# Patient Record
Sex: FEMALE | Race: BLACK OR AFRICAN AMERICAN | NOT HISPANIC OR LATINO | URBAN - METROPOLITAN AREA
[De-identification: names, ages, dates, MRNs, and addresses within clinical notes are randomized per-mention and may not be internally consistent; named-entity substitution may affect disease eponyms.]

---

## 2016-12-30 NOTE — PROGRESS NOTE ADULT - SUBJECTIVE AND OBJECTIVE BOX
seen and examined by me.  See H+P earlier today signed by Dr. Murry.  monitor on tele  check tte  continue ivf  hopeful transfer to inpatient psych soon, when stable  discussed with Dr. Cantrell (psychiatry)

## 2016-12-30 NOTE — CONSULT NOTE ADULT - SUBJECTIVE AND OBJECTIVE BOX
Chief Complaint:  Patient is a 51y old  Female who presents with a chief complaint of     HPI:  HPI:      Review of Systems:    General:  No wt loss, fevers, chills, night sweats  Eyes:  Good vision, no reported pain  ENT:  No sore throat, pain, runny nose, dysphagia  CV:  No pain, palpitatioins, hypo/hypertension  Resp:  No dyspnea, cough, tachypnea, wheezing  GI:  No pain, nausea, vomiting, diarrhea, constipatiion  :  No pain, bleeding, incontinence, nocturia  Muscle:  No pain, weakness  Breast:  No pain, abscess, mass, discharge  Neuro:  No weakness, tingling, memory problems  Psych:  No fatigue, insomnia, mood problems, depression  Endocrine:  No polyuria, polydypsia, cold/heat intolerance  Heme:  No petechiae, ecchymosis, easy bruisability  Skin:  No rash, tattoos, scars, edema    Relevant Family History:       Relevant Social History:      Discussed with:  PMD, Family    Physical Exam:      Vital Signs:  Vital Signs Last 24 Hrs  T(C): 36, Max: 36 (12-30 @ 03:34)  T(F): 96.8, Max: 96.8 (12-30 @ 03:34)  HR: 76 (76 - 76)  BP: 130/69 (130/69 - 130/69)  BP(mean): --  RR: 16 (16 - 16)  SpO2: 100% (100% - 100%)  Daily     Daily   I&O's Summary      General:  Appears stated age, well-groomed, well-nourished, no distress  HEENT:  NC/AT, patent nares w/ pink mucosa, OP clear w/o lesions, PERRL, EOMI, conjunctivae clear, no thyromegaly, nodules, adenopathy, no JVD  Chest:  Full & symmetric excursion, no increased effort, breath sounds clear  Cardiovascular:  Regular rhythm, S1, S2, no murmur/rub/S3/S4, no carotid/femoral/abdominal bruit, radial/pedal pulses 2+, no edema  Abdomen:  Soft, non-tender, non-distended, normoactive bowel sounds, no HSM  Extremities:  Gait & station:   Digits:   Nails:   Joints, Bones, Muscles:   ROM:   Stability:  Skin:  No rash/erythema/ecchymoses/petechiae/wounds/abscess/warm/dry  Musculoskeletal:  Full ROM in all joints w/o swelling/tenderness/effusion  Neuro/Psych:  Alert, oriented, normal and symmetric strength in UEs, LEs, normal gait, sensation intact, affect:   mood:   appearance:       Laboratory:                            13.8   7.2   )-----------( 241      ( 30 Dec 2016 03:26 )             39.9     30 Dec 2016 03:26    143    |  104    |  11     ----------------------------<  140    3.4     |  28     |  0.84     Ca    9.1        30 Dec 2016 03:26  Phos  4.9       30 Dec 2016 04:51  Mg     2.1       30 Dec 2016 03:26    TPro  7.8    /  Alb  3.7    /  TBili  0.3    /  DBili  x      /  AST  60     /  ALT  55     /  AlkPhos  60     30 Dec 2016 03:26    ABG - ( 30 Dec 2016 03:26 )  pH: x     /  pCO2: 40    /  pO2: 85    / HCO3: 27    / Base Excess: 3.1   /  SaO2: 96                CARDIAC MARKERS ( 30 Dec 2016 03:26 )  <.015 ng/mL / x     / 2095 U/L / x     / x          CAPILLARY BLOOD GLUCOSE    LIVER FUNCTIONS - ( 30 Dec 2016 03:26 )  Alb: 3.7 g/dL / Pro: 7.8 gm/dL / ALK PHOS: 60 U/L / ALT: 55 U/L / AST: 60 U/L / GGT: x           PT/INR - ( 30 Dec 2016 03:26 )   PT: 13.7 sec;   INR: 1.22 ratio         PTT - ( 30 Dec 2016 03:26 )  PTT:24.9 sec      Imaging:      Assessment:      Plan:         Sergio Yang MD, FACC, FASE, FASNC, FACP  Director, Heart Failure Services  [facility]  , Department of Cardiology  [employer] [facility] [employer] Chief Complaint:  Patient is a 51y old female who presents with a chief complaint of syncope    HPI:     All: Ampicillin    Past Medical history: Mood constipation    Medications:   Colace  Remeron    Review of Systems:    General:  No wt loss, fevers, chills, night sweats  Eyes:  Good vision, no reported pain  ENT:  No sore throat, pain, runny nose, dysphagia  CV:  No pain, palpitations, hypo/hypertension  Resp:  No dyspnea, cough, tachypnea, wheezing  GI:  No pain, nausea, vomiting, diarrhea, constipation  :  No pain, bleeding, incontinence, nocturia  Muscle:  No pain, weakness  Breast:  No pain, abscess, mass, discharge  Neuro:  No weakness, tingling, memory problems  Psych:  No fatigue, insomnia, mood problems, depression  Endocrine:  No polyuria, polydipsia cold/heat intolerance  Heme:  No petechiae, ecchymosis, easy bruisability  Skin:  No rash, tattoos, scars, edema    Relevant Family History:       Relevant Social History:      Discussed with:  PMD, Family    Physical Exam:    Tele: SR.  Vital Signs:  Vital Signs Last 24 Hrs  T(C): 36, Max: 36 (12-30 @ 03:34)  T(F): 96.8, Max: 96.8 (12-30 @ 03:34)  HR: 76 (76 - 76)  BP: 130/69 (130/69 - 130/69)  RR: 16 (16 - 16)  SpO2: 100% (100% - 100%)      General:  Appears stated age, well-groomed, well-nourished, no distress  HEENT:  NC/AT, patent nares w/ pink mucosa, OP clear w/o lesions, PERRL, EOMI, conjunctivae clear, no thyromegaly, nodules, adenopathy, no JVD  Chest:  Full & symmetric excursion, no increased effort, breath sounds clear  Cardiovascular:  Regular rhythm, S1, S2, no murmur/rub/S3/S4, no carotid/femoral/abdominal bruit, radial/pedal pulses 2+, no edema  Abdomen:  Soft, non-tender, non-distended, normoactive bowel sounds, no HSM  Extremities:  Gait & station:   Digits:   Nails:   Joints, Bones, Muscles:   ROM:   Stability:  Skin:  No rash/erythema/ecchymoses/petechiae/wounds/abscess/warm/dry  Musculoskeletal:  Full ROM in all joints w/o swelling/tenderness/effusion  Neuro/Psych:  Alert, oriented, normal and symmetric strength in UEs, LEs, normal gait, sensation intact, affect:   mood:   appearance:       Laboratory:                            13.8   7.2   )-----------( 241      ( 30 Dec 2016 03:26 )             39.9     30 Dec 2016 03:26    143    |  104    |  11     ----------------------------<  140    3.4     |  28     |  0.84     Ca    9.1        30 Dec 2016 03:26  Phos  4.9       30 Dec 2016 04:51  Mg     2.1       30 Dec 2016 03:26    TPro  7.8    /  Alb  3.7    /  TBili  0.3    /  DBili  x      /  AST  60     /  ALT  55     /  AlkPhos  60     30 Dec 2016 03:26    ABG - ( 30 Dec 2016 03:26 )  pH: x     /  pCO2: 40    /  pO2: 85    / HCO3: 27    / Base Excess: 3.1   /  SaO2: 96          CARDIAC MARKERS ( 30 Dec 2016 03:26 )  <.015 ng/mL / x     / 2095 U/L / x     / x          CAPILLARY BLOOD GLUCOSE    LIVER FUNCTIONS - ( 30 Dec 2016 03:26 )  Alb: 3.7 g/dL / Pro: 7.8 gm/dL / ALK PHOS: 60 U/L / ALT: 55 U/L / AST: 60 U/L / GGT: x           PT/INR - ( 30 Dec 2016 03:26 )   PT: 13.7 sec;   INR: 1.22 ratio         PTT - ( 30 Dec 2016 03:26 )  PTT:24.9 sec      Imaging:      Assessment: 50 yo F with mood disorder, hx of constipation pw syncope.      Plan:   Tele observation, check vitals, check echo.  Con't:  docusate sodium 100milliGRAM(s) Oral two times a day  sodium chloride 0.9%. 1000milliLiter(s) IV Continuous <Continuous>        Sergio Yang MD, FACC, FASE, FASNC, FACP  Director, Heart Failure Services  [facility]  , Department of Cardiology  [facility] Chief Complaint:  Patient is a 51y old female who presents with a chief complaint of syncope    HPI: 50 yo F with mood disorder, constipation, obese by BMI, admitted to psych [facility] with confusion/delirium/mood disturbance. Lost consciencness in sousa. Sent over to tele for futher work up. Prediabetes reported. Mild chest pain, No sob, pnd, palps, edema.    All: Ampicillin    Past Medical history: Mood disorder, constipation, predm, obese.    Medications:   Colace prn  Remeron 1 mg BID    Relevant Family History:   CAD in aunt.    Relevant Social History:  Non-smoker    Review of Systems:    General:  No wt loss, fevers, chills, night sweats  Eyes:  Good vision, no reported pain  ENT:  No sore throat, pain, runny nose, dysphagia  CV:  No pain, palpitations, hypo/hypertension  Resp:  No dyspnea, cough, tachypnea, wheezing  GI:  No pain, nausea, vomiting, diarrhea. Hx of constipation.  :  No pain, bleeding, incontinence, nocturia  Muscle:  No pain, weakness  Breast:  No pain, abscess, mass, discharge  Neuro:  No weakness, tingling, memory problems  Psych:  Mood disturbance noted.  Endocrine:  No polyuria, polydipsia cold/heat intolerance  Heme:  No petechiae, ecchymosis, easy bruisability  Skin:  No rash, tattoos, scars, edema      Physical Exam:    Tele: SR.  Vital Signs:  Vital Signs Last 24 Hrs  T(C): 36, Max: 36 (12-30 @ 03:34)  T(F): 96.8, Max: 96.8 (12-30 @ 03:34)  HR: 76 (76 - 76)  BP: 130/69 (130/69 - 130/69)  RR: 16 (16 - 16)  SpO2: 100% (100% - 100%)      General:  Appears stated age, well-groomed, well-nourished, no distress  HEENT:  NC/AT, patent nares w/ pink mucosa, OP clear w/o lesions, EOMI, conjunctivae clear, no thyromegaly, nodules, adenopathy, no JVD  Chest:  Full & symmetric excursion, no increased effort, breath sounds clear  Cardiovascular:  Regular rhythm, S1, S2, no murmur/rub/S3/S4, radial pulses 1+, no edema  Abdomen:  Soft, non-tender, non-distended, normoactive bowel sounds  Extremities:  No c/c/edema.  Skin:  No rash/erythema. Skin is warm/dry  Musculoskeletal:  Full ROM in all joints w/o swelling/tenderness/effusion  Neuro/Psych:  Alert.    Laboratory:                            13.8   7.2   )-----------( 241      ( 30 Dec 2016 03:26 )             39.9     30 Dec 2016 03:26    143    |  104    |  11     ----------------------------<  140    3.4     |  28     |  0.84     Ca    9.1        30 Dec 2016 03:26  Phos  4.9       30 Dec 2016 04:51  Mg     2.1       30 Dec 2016 03:26    TPro  7.8    /  Alb  3.7    /  TBili  0.3    /  DBili  x      /  AST  60     /  ALT  55     /  AlkPhos  60     30 Dec 2016 03:26    ABG - ( 30 Dec 2016 03:26 )  pH: x     /  pCO2: 40    /  pO2: 85    / HCO3: 27    / Base Excess: 3.1   /  SaO2: 96          CARDIAC MARKERS ( 30 Dec 2016 03:26 )  <.015 ng/mL / x     / 2095 U/L / x     / x          CAPILLARY BLOOD GLUCOSE    LIVER FUNCTIONS - ( 30 Dec 2016 03:26 )  Alb: 3.7 g/dL / Pro: 7.8 gm/dL / ALK PHOS: 60 U/L / ALT: 55 U/L / AST: 60 U/L / GGT: x           PT/INR - ( 30 Dec 2016 03:26 )   PT: 13.7 sec;   INR: 1.22 ratio         PTT - ( 30 Dec 2016 03:26 )  PTT:24.9 sec      Imaging:   cxr:No focal air space opacities.  head ct:   No acute intracranial hemorrhage, mass effect, or acute osseous fracture.  ECG: SR, nonspecific ST T abnls.    Assessment: 50 yo F with mood disorder, hx of constipation pw syncope event in psych sousa. Normal SR on tele. Possible vagal episode.    Plan:   Tele observation, check vitals, check echo.  Con't:  docusate sodium 100milliGRAM(s) Oral two times a day  sodium chloride 0.9%. 1000milliLiter(s) IV Continuous <Continuous>  Supportive care.      Sergio Yang MD, FACC, FASE, FASNC, FACP  Director, Heart Failure Services  [facility]  , Department of Cardiology  [facility]

## 2016-12-31 NOTE — PROGRESS NOTE ADULT - SUBJECTIVE AND OBJECTIVE BOX
Patient is a 51y old  Female who presents with a chief complaint of     INTERVAL HPI/OVERNIGHT EVENTS:  patient notes mild discomfort at iv site.  Also difficulty sleeping.  No chest pain palpitations, nausea or vomiting.    MEDICATIONS  (STANDING):  docusate sodium 100milliGRAM(s) Oral two times a day  sodium chloride 0.9%. 1000milliLiter(s) IV Continuous <Continuous>  atorvastatin 20milliGRAM(s) Oral at bedtime    MEDICATIONS  (PRN):  LORazepam     Tablet 1milliGRAM(s) Oral every 6 hours PRN Agitation  LORazepam   Injectable 2milliGRAM(s) IntraMuscular once PRN Agitation  haloperidol     Tablet 5milliGRAM(s) Oral every 6 hours PRN agitation/psychosis  haloperidol    Injectable 5milliGRAM(s) IntraMuscular once PRN agitation  diphenhydrAMINE   Capsule 50milliGRAM(s) Oral every 6 hours PRN Extrapyramidal prophylaxis  diphenhydrAMINE   Injectable 50milliGRAM(s) IntraMuscular once PRN agitation  acetaminophen   Tablet. 650milliGRAM(s) Oral every 6 hours PRN Moderate Pain (4 - 6)  aluminum hydroxide/magnesium hydroxide/simethicone Suspension 30milliLiter(s) Oral every 6 hours PRN Dyspepsia      Allergies    ampicillin (Unknown)    Intolerances        REVIEW OF SYSTEMS:  CONSTITUTIONAL: + fatigue No fever or weight loss  EYES: No eye pain, visual disturbances, or discharge  ENMT:  No difficulty hearing, tinnitus, vertigo; No sinus or throat pain  NECK: No pain or stiffness  BREASTS: No pain, masses, or nipple discharge  RESPIRATORY: No cough, wheezing, chills or hemoptysis; No shortness of breath  CARDIOVASCULAR: intermittent chest pain, no palpitations, dizziness, or leg swelling  GASTROINTESTINAL: No abdominal or epigastric pain. No nausea, vomiting, or hematemesis; No diarrhea or constipation. No melena or hematochezia.  GENITOURINARY: No dysuria, frequency, hematuria, or incontinence  NEUROLOGICAL: No headaches, memory loss, loss of strength, numbness, or tremors  SKIN: No itching, burning, rashes, or lesions   LYMPH NODES: No enlarged glands  ENDOCRINE: No heat or cold intolerance; No hair loss  MUSCULOSKELETAL: No joint pain or swelling; No muscle, back, or extremity pain  PSYCHIATRIC: depression, anxiety, "feels sad" difficulty sleeping no mood swings   HEME/LYMPH: No easy bruising, or bleeding gums  ALLERGY AND IMMUNOLOGIC: No hives or eczema    Vital Signs Last 24 Hrs  T(C): 36.2, Max: 37 (12-31 @ 05:05)  T(F): 97.2, Max: 98.6 (12-31 @ 05:05)  HR: 82 (69 - 89)  BP: 134/81 (123/69 - 140/70)  BP(mean): --  RR: 18 (16 - 18)  SpO2: 99% (98% - 100%)    PHYSICAL EXAM:  GENERAL: NAD, well-groomed, well-developed  HEAD:  Atraumatic, Normocephalic  EYES: EOMI, PERRLA, conjunctiva and sclera clear  ENMT: No tonsillar erythema, exudates, or enlargement; Moist mucous membranes, Good dentition, No lesions  NECK: Supple, No JVD  NERVOUS SYSTEM:  Alert & Oriented X3, Good concentration; Motor Strength 5/5 B/L upper and lower extremities; DTRs 2+ intact and symmetric  CHEST/LUNG: Clear to auscultation bilaterally; No rales, rhonchi, wheezing, or rubs  HEART: Regular rate and rhythm; No murmurs, rubs, or gallops  ABDOMEN: Soft, Nontender, Nondistended; Bowel sounds present  EXTREMITIES:  2+ Peripheral Pulses, No clubbing, cyanosis, or edema  LYMPH: No lymphadenopathy noted  SKIN: No rashes or lesions    LABS:                        13.8   7.2   )-----------( 241      ( 30 Dec 2016 03:26 )             39.9     31 Dec 2016 06:41    146    |  106    |  7      ----------------------------<  119    3.3     |  30     |  0.73     Ca    9.0        31 Dec 2016 06:41  Phos  4.9       30 Dec 2016 04:51  Mg     2.1       30 Dec 2016 03:26    TPro  7.8    /  Alb  3.7    /  TBili  0.3    /  DBili  x      /  AST  60     /  ALT  55     /  AlkPhos  60     30 Dec 2016 03:26    PT/INR - ( 30 Dec 2016 03:26 )   PT: 13.7 sec;   INR: 1.22 ratio         PTT - ( 30 Dec 2016 03:26 )  PTT:24.9 sec    CAPILLARY BLOOD GLUCOSE      RADIOLOGY & ADDITIONAL TESTS:    Imaging Personally Reviewed:  [x ] YES  [ ] NO    Consultant(s) Notes Reviewed:  [x ] YES  [ ] NO    Care Discussed with Consultants/Other Providers [x] YES  [ ] NO

## 2016-12-31 NOTE — PROGRESS NOTE ADULT - SUBJECTIVE AND OBJECTIVE BOX
Chief Complaint:  Patient is a 51y old female who presents with a chief complaint of syncope    HPI: 52 yo F with mood disorder, constipation, obese by BMI, admitted to psych [facility] with confusion/delirium/mood disturbance. Lost consciencness in sousa. Sent over to tele for futher work up. Prediabetes reported. Mild chest pain, No sob, pnd, palps, edema.    All: Ampicillin    Past Medical history: Mood disorder, constipation, predm, obese.    Medications:   Colace prn  Remeron 1 mg BID    Relevant Family History:   CAD in aunt.    Relevant Social History:  Non-smoker    Review of Systems:    General:  No wt loss, fevers, chills, night sweats  Eyes:  Good vision, no reported pain  ENT:  No sore throat, pain, runny nose, dysphagia  CV:  No pain, palpitations, hypo/hypertension  Resp:  No dyspnea, cough, tachypnea, wheezing  GI:  No pain, nausea, vomiting, diarrhea. Hx of constipation.  :  No pain, bleeding, incontinence, nocturia  Muscle:  No pain, weakness  Breast:  No pain, abscess, mass, discharge  Neuro:  No weakness, tingling, memory problems  Psych:  Mood disturbance noted.  Endocrine:  No polyuria, polydipsia cold/heat intolerance  Heme:  No petechiae, ecchymosis, easy bruisability  Skin:  No rash, tattoos, scars, edema      Physical Exam:      Vital Signs Last 24 Hrs  T(C): 36.2, Max: 37 (12-31 @ 05:05)  T(F): 97.2, Max: 98.6 (12-31 @ 05:05)  HR: 82 (69 - 89)  BP: 134/81 (123/69 - 140/70)  RR: 18 (16 - 18)  SpO2: 99% (98% - 100%)    Tele: SR/ST.  General:  Appears stated age, well-groomed, well-nourished, no distress  HEENT:  NC/AT, patent nares w/ pink mucosa, OP clear w/o lesions, EOMI, conjunctivae clear, no thyromegaly, nodules, adenopathy, no JVD  Chest:  Full & symmetric excursion, no increased effort, breath sounds clear  Cardiovascular:  Regular rhythm, S1, S2, no murmur/rub/S3/S4, radial pulses 1+, no edema  Abdomen:  Soft, non-tender, non-distended, normoactive bowel sounds  Extremities:  No c/c/edema.  Skin:  No rash/erythema. Skin is warm/dry  Musculoskeletal:  Full ROM in all joints w/o swelling/tenderness/effusion  Neuro/Psych:  Alert.    Laboratory:                          13.8   7.2   )-----------( 241      ( 30 Dec 2016 03:26 )             39.9     Basic Metabolic Panel in AM (12.31.16 @ 06:41)    Sodium, Serum: 146 mmol/L    Potassium, Serum: 3.3 mmol/L    Chloride, Serum: 106 mmol/L    Carbon Dioxide, Serum: 30 mmol/L    Anion Gap, Serum: 10 mmol/L    Blood Urea Nitrogen, Serum: 7 mg/dL    Creatinine, Serum: 0.73 mg/dL    Glucose, Serum: 119 mg/dL    Calcium, Total Serum: 9.0 mg/dL      TPro  7.8    /  Alb  3.7    /  TBili  0.3    /  DBili  x      /  AST  60     /  ALT  55     /  AlkPhos  60     30 Dec 2016 03:26    ABG - ( 30 Dec 2016 03:26 )  pH: x     /  pCO2: 40    /  pO2: 85    / HCO3: 27    / Base Excess: 3.1   /  SaO2: 96          CARDIAC MARKERS ( 30 Dec 2016 03:26 )  <.015 ng/mL / x     / 2095 U/L / x     / x          CAPILLARY BLOOD GLUCOSE    LIVER FUNCTIONS - ( 30 Dec 2016 03:26 )  Alb: 3.7 g/dL / Pro: 7.8 gm/dL / ALK PHOS: 60 U/L / ALT: 55 U/L / AST: 60 U/L / GGT: x           PT/INR - ( 30 Dec 2016 03:26 )   PT: 13.7 sec;   INR: 1.22 ratio         PTT - ( 30 Dec 2016 03:26 )  PTT:24.9 sec      Imaging:   cxr:No focal air space opacities.  head ct:   No acute intracranial hemorrhage, mass effect, or acute osseous fracture.  ECG: SR, nonspecific ST T abnls.    Assessment: 52 yo F with mood disorder, hx of constipation pw syncope event in psych [facility]. Normal SR on tele. Possible vagal episode.    Plan:   Telemetry monitoring, echo pending, f/u labs.  Con't treatment with:  docusate sodium 100milliGRAM(s) Oral two times a day  sodium chloride 0.9%. 1000milliLiter(s) IV Continuous <Continuous>  atorvastatin 20milliGRAM(s) Oral at bedtime    Sergio Yang MD, FACC, FASE, FASNC, FACP  Director, Heart Failure Services  [facility]  , Department of Cardiology  [facility] Chief Complaint:  Patient is a 51y old female who presents with a chief complaint of syncope    HPI: 50 yo F with mood disorder, constipation, obese by BMI, admitted to psych [facility] with confusion/delirium/mood disturbance. Lost consciencness in sousa. Sent over to tele for futher work up. Prediabetes reported. Mild chest pain, No sob, pnd, palps, edema.    All: Ampicillin    Past Medical history: Mood disorder, constipation, predm, obese.    Medications:   Colace prn  Remeron 1 mg BID    Relevant Family History:   CAD in aunt.    Relevant Social History:  Non-smoker    Review of Systems:    General:  No wt loss, fevers, chills, night sweats  Eyes:  Good vision, no reported pain  ENT:  No sore throat, pain, runny nose, dysphagia  CV:  No pain, palpitations, hypo/hypertension  Resp:  No dyspnea, cough, tachypnea, wheezing  GI:  No pain, nausea, vomiting, diarrhea. Hx of constipation.  :  No pain, bleeding, incontinence, nocturia  Muscle:  No pain, weakness  Breast:  No pain, abscess, mass, discharge  Neuro:  No weakness, tingling, memory problems  Psych:  Mood disturbance noted.  Endocrine:  No polyuria, polydipsia cold/heat intolerance  Heme:  No petechiae, ecchymosis, easy bruisability  Skin:  No rash, tattoos, scars, edema      Physical Exam:      Vital Signs Last 24 Hrs  T(C): 36.2, Max: 37 (12-31 @ 05:05)  T(F): 97.2, Max: 98.6 (12-31 @ 05:05)  HR: 82 (69 - 89)  BP: 134/81 (123/69 - 140/70)  RR: 18 (16 - 18)  SpO2: 99% (98% - 100%)    Tele: SR/ST.  General:  Appears stated age, well-groomed, well-nourished, no distress  HEENT:  NC/AT, patent nares w/ pink mucosa, OP clear w/o lesions, EOMI, conjunctivae clear, no thyromegaly, nodules, adenopathy, no JVD  Chest:  Full & symmetric excursion, no increased effort, breath sounds clear  Cardiovascular:  Regular rhythm, S1, S2, no murmur/rub/S3/S4, radial pulses 1+, no edema  Abdomen:  Soft, non-tender, non-distended, normoactive bowel sounds  Extremities:  No c/c/edema.  Skin:  No rash/erythema. Skin is warm/dry  Musculoskeletal:  Full ROM in all joints w/o swelling/tenderness/effusion  Neuro/Psych:  Alert.    Laboratory:                          13.8   7.2   )-----------( 241      ( 30 Dec 2016 03:26 )             39.9     Basic Metabolic Panel in AM (12.31.16 @ 06:41)    Sodium, Serum: 146 mmol/L    Potassium, Serum: 3.3 mmol/L    Chloride, Serum: 106 mmol/L    Carbon Dioxide, Serum: 30 mmol/L    Anion Gap, Serum: 10 mmol/L    Blood Urea Nitrogen, Serum: 7 mg/dL    Creatinine, Serum: 0.73 mg/dL    Glucose, Serum: 119 mg/dL    Calcium, Total Serum: 9.0 mg/dL      TPro  7.8    /  Alb  3.7    /  TBili  0.3    /  DBili  x      /  AST  60     /  ALT  55     /  AlkPhos  60     30 Dec 2016 03:26    ABG - ( 30 Dec 2016 03:26 )  pH: x     /  pCO2: 40    /  pO2: 85    / HCO3: 27    / Base Excess: 3.1   /  SaO2: 96          CARDIAC MARKERS ( 30 Dec 2016 03:26 )  <.015 ng/mL / x     / 2095 U/L / x     / x          CAPILLARY BLOOD GLUCOSE    LIVER FUNCTIONS - ( 30 Dec 2016 03:26 )  Alb: 3.7 g/dL / Pro: 7.8 gm/dL / ALK PHOS: 60 U/L / ALT: 55 U/L / AST: 60 U/L / GGT: x           PT/INR - ( 30 Dec 2016 03:26 )   PT: 13.7 sec;   INR: 1.22 ratio         PTT - ( 30 Dec 2016 03:26 )  PTT:24.9 sec      Imaging:   cxr:No focal air space opacities.  head ct:   No acute intracranial hemorrhage, mass effect, or acute osseous fracture.  ECG: SR, nonspecific ST T abnls.    Assessment: 50 yo F with mood disorder, hx of constipation pw syncope event in psych [facility]a. Normal SR on tele. Possible vagal episode.    Plan:   Telemetry monitoring, postural vitals, echo pending, f/u labs.  Con't treatment with:  docusate sodium 100milliGRAM(s) Oral two times a day  sodium chloride 0.9%. 1000milliLiter(s) IV Continuous <Continuous>  atorvastatin 20milliGRAM(s) Oral at bedtime    Sergio Yang MD, FACC, FASMARIANO, KURT, FACP  Director, Heart Failure Services  [facility]  , Department of Cardiology  [facility]

## 2016-12-31 NOTE — PROGRESS NOTE ADULT - ASSESSMENT
50 y/o female who's initially here for Decompensation 2dy to Non-compliance to meds for Bipolar Disorder, is being admitted from Psych Unit to Telemetry s/p Fall/Syncope.

## 2016-12-31 NOTE — PROGRESS NOTE ADULT - ATTENDING COMMENTS
possible transfer to psych tomorrow if stable  2 pc done, will require approval of attending covering psychiatrist tomorrow

## 2016-12-31 NOTE — PROGRESS NOTE ADULT - PROBLEM SELECTOR PLAN 1
tachy on tele to 150s corresponds to period of agitation as per rn  remote telemetry  fall precautions  tte ef 60-65% essentially normal  IVF  f/u head and cervical spine imaging results

## 2017-01-01 NOTE — PROGRESS NOTE ADULT - PROBLEM SELECTOR PLAN 1
syncope likely vasovagal  tachycardia resolve yesterday, has been nsr for 24 hrsd, d/c tele  tte ef 60-65% essentially normal  check orthostatics  cleared by cards for discharge

## 2017-01-01 NOTE — PROGRESS NOTE ADULT - SUBJECTIVE AND OBJECTIVE BOX
Patient is a 51y old  Female who presents with a chief complaint of     INTERVAL HPI/OVERNIGHT EVENTS:  c/o mild headache in occipital area, no lightheadedness    MEDICATIONS  (STANDING):  docusate sodium 100milliGRAM(s) Oral two times a day  sodium chloride 0.9%. 1000milliLiter(s) IV Continuous <Continuous>  atorvastatin 20milliGRAM(s) Oral at bedtime    MEDICATIONS  (PRN):  LORazepam     Tablet 1milliGRAM(s) Oral every 6 hours PRN Agitation  LORazepam   Injectable 2milliGRAM(s) IntraMuscular once PRN Agitation  haloperidol     Tablet 5milliGRAM(s) Oral every 6 hours PRN agitation/psychosis  haloperidol    Injectable 5milliGRAM(s) IntraMuscular once PRN agitation  diphenhydrAMINE   Capsule 50milliGRAM(s) Oral every 6 hours PRN Extrapyramidal prophylaxis  diphenhydrAMINE   Injectable 50milliGRAM(s) IntraMuscular once PRN agitation  acetaminophen   Tablet. 650milliGRAM(s) Oral every 6 hours PRN Moderate Pain (4 - 6)  aluminum hydroxide/magnesium hydroxide/simethicone Suspension 30milliLiter(s) Oral every 6 hours PRN Dyspepsia      Allergies    ampicillin (Unknown)    Intolerances        REVIEW OF SYSTEMS:  CONSTITUTIONAL: No fever, weight loss, or fatigue  EYES: No eye pain, visual disturbances, or discharge  ENMT:  No difficulty hearing, tinnitus, vertigo; No sinus or throat pain  NECK: No pain or stiffness  BREASTS: No pain, masses, or nipple discharge  RESPIRATORY: No cough, wheezing, chills or hemoptysis; No shortness of breath  CARDIOVASCULAR: No chest pain, palpitations, dizziness, or leg swelling  GASTROINTESTINAL: No abdominal or epigastric pain. No nausea, vomiting, or hematemesis; No diarrhea or constipation. No melena or hematochezia.  GENITOURINARY: No dysuria, frequency, hematuria, or incontinence  NEUROLOGICAL: No headaches, memory loss, loss of strength, numbness, or tremors  SKIN: No itching, burning, rashes, or lesions   LYMPH NODES: No enlarged glands  ENDOCRINE: No heat or cold intolerance; No hair loss  MUSCULOSKELETAL: No joint pain or swelling; No muscle, back, or extremity pain  PSYCHIATRIC: No depression, anxiety, mood swings, or difficulty sleeping  HEME/LYMPH: No easy bruising, or bleeding gums  ALLERGY AND IMMUNOLOGIC: No hives or eczema    Vital Signs Last 24 Hrs  T(C): 37, Max: 37.2 (12-31 @ 17:26)  T(F): 98.6, Max: 99 (12-31 @ 17:26)  HR: 92 (77 - 92)  BP: 120/64 (101/51 - 120/64)  BP(mean): --  RR: 18 (16 - 18)  SpO2: 97% (97% - 99%)    PHYSICAL EXAM:  GENERAL: NAD, well-groomed, well-developed  HEAD:  Atraumatic, Normocephalic  EYES: EOMI, PERRLA, conjunctiva and sclera clear  ENMT: No tonsillar erythema, exudates, or enlargement; Moist mucous membranes, Good dentition, No lesions  NECK: Supple, No JVD, Normal thyroid  NERVOUS SYSTEM:  Alert & Oriented X3, Good concentration; Motor Strength 5/5 B/L upper and lower extremities; DTRs 2+ intact and symmetric  CHEST/LUNG: Clear to percussion bilaterally; No rales, rhonchi, wheezing, or rubs  HEART: Regular rate and rhythm; No murmurs, rubs, or gallops  ABDOMEN: Soft, Nontender, Nondistended; Bowel sounds present  EXTREMITIES:  2+ Peripheral Pulses, No clubbing, cyanosis, or edema  LYMPH: No lymphadenopathy noted  SKIN: No rashes or lesions  PSYCH;  flat affect    LABS:    31 Dec 2016 06:41    146    |  106    |  7      ----------------------------<  119    3.3     |  30     |  0.73     Ca    9.0        31 Dec 2016 06:41          CAPILLARY BLOOD GLUCOSE      RADIOLOGY & ADDITIONAL TESTS:    Imaging Personally Reviewed:  [ x] YES  [ ] NO    Consultant(s) Notes Reviewed:  [ x] YES  [ ] NO    Care Discussed with Consultants/Other Providers [ ] YES  [ ] NO

## 2017-01-01 NOTE — PROGRESS NOTE ADULT - SUBJECTIVE AND OBJECTIVE BOX
Chief Complaint:  Patient is a 51y old female who presents with a chief complaint of syncope    HPI: 52 yo F with mood disorder, constipation, obese by BMI, admitted to psych sousa with confusion/delirium/mood disturbance. Lost consciencness in sousa. Sent over to tele for futher work up. Prediabetes reported. Mild chest pain, No sob, pnd, palps, edema. Feels some mild dizziness at times.      Review of Systems:    General:  No wt loss, fevers, chills, night sweats  Eyes:  Good vision, no reported pain  ENT:  No sore throat, pain, runny nose, dysphagia  CV:  No pain, palpitations, hypo/hypertension  Resp:  No dyspnea, cough, tachypnea, wheezing  GI:  No pain, nausea, vomiting, diarrhea. Hx of constipation.  :  No pain, bleeding, incontinence, nocturia  Muscle:  No pain, weakness  Breast:  No pain, abscess, mass, discharge  Neuro:  No weakness, tingling, memory problems, positive dizziness.  Psych:  Mood disturbance noted.  Endocrine:  No polyuria, polydipsia cold/heat intolerance  Heme:  No petechiae, ecchymosis, easy bruisability  Skin:  No rash, tattoos, scars, edema      Physical Exam:    Vital Signs Last 24 Hrs  T(C): 37, Max: 37.2 (12-31 @ 17:26)  T(F): 98.6, Max: 99 (12-31 @ 17:26)  HR: 92 (77 - 92)  BP: 120/64 (101/51 - 120/64)  RR: 18 (16 - 18)  SpO2: 97% (97% - 99%)    Tele: SR/ST.  General:  Appears stated age, well-groomed, well-nourished, no distress  HEENT:  NC/AT, patent nares w/ pink mucosa, OP clear w/o lesions, EOMI, conjunctivae clear, no thyromegaly, nodules, adenopathy, no JVD  Chest:  Full & symmetric excursion, no increased effort, breath sounds clear  Cardiovascular:  Regular rhythm, S1, S2, no murmur/rub/S3/S4, radial pulses 1+, no edema  Abdomen:  Soft, non-tender, non-distended, normoactive bowel sounds  Extremities:  No c/c/edema.  Skin:  No rash/erythema. Skin is warm/dry  Musculoskeletal:  Full ROM in all joints w/o swelling/tenderness/effusion  Neuro/Psych:  Alert.    Laboratory:        CPK downtrending to 537          PTT - ( 30 Dec 2016 03:26 )  PTT:24.9 sec      Imaging:   cxr:No focal air space opacities.    head ct: No acute intracranial hemorrhage, mass effect, or acute osseous fracture.    ECG: SR, nonspecific ST T abnls.    echo:   1. Left ventricular ejection fraction, by visual estimation, is 60 to 65%.   2. Technically limited study.   3. Mild pulmonic valve regurgitation.   4. Trace mitral and tricuspid valve regurgitation.    Assessment: 52 yo F with mood disorder, hx of constipation pw syncope event in psych sousa. Normal SR on tele. Possible vagal episode. Normal EF.    Plan:   d/c tele. c/w supportive care. transfer to psych vs outpatient care as per team. Perhaps consider Antivert for dizziness if needed.  Con't treatment with:  docusate sodium 100milliGRAM(s) Oral two times a day  sodium chloride 0.9%. 1000milliLiter(s) IV Continuous <Continuous>  atorvastatin 20milliGRAM(s) Oral at bedtime    Sergio Yang MD, FACC, DULCE MARIA, KURT, FACP  Director, Heart Failure Services  [facility]  , Department of Cardiology  [employer] [facility] [employer] [employer]

## 2017-01-01 NOTE — PROGRESS NOTE ADULT - ASSESSMENT
52 y/o female who's initially here for Decompensation 2dy to Non-compliance to meds for Bipolar Disorder, is being admitted from Psych Unit to Telemetry s/p Fall/Syncope.

## 2017-01-01 NOTE — PROGRESS NOTE ADULT - PROBLEM SELECTOR PLAN 2
continue with psych recommendations  Findings and Case d/w Dr Cantrell  psych fu requested as pt. medically cleared to return back to psych

## 2017-01-02 NOTE — PROGRESS NOTE ADULT - ASSESSMENT
52 y/o female who's initially here for Decompensation 2/2 to Non-compliance to meds for Bipolar Disorder, is being admitted from Psych Unit to Telemetry s/p Fall/Syncope. TTE- EF 60-65% essentially normal

## 2017-01-02 NOTE — PHYSICAL THERAPY INITIAL EVALUATION ADULT - PERTINENT HX OF CURRENT PROBLEM, REHAB EVAL
Patient admitted to 2B with mood disorder, constipation and obesity, admitted to psych with confusion/delirium and mood disturbance. During 2B visit, lost consciousness in sousa and was sent to 2D for further workup. x-ray negative for acute fx or traumatic malalignment in C/S, no hemorrhage, fracture or mass effect. Echocardiogram shows ejection fraction of 60-65%, mild pulmonic valve regurgitation and trace mitral/tricuspid valve regurgitation

## 2017-01-02 NOTE — PHYSICAL THERAPY INITIAL EVALUATION ADULT - ACTIVE RANGE OF MOTION EXAMINATION, REHAB EVAL
bilateral upper extremity Active ROM was WFL (within functional limits)/bilateral  lower extremity Active ROM was WFL (within functional limits)

## 2017-01-02 NOTE — PROGRESS NOTE ADULT - PROBLEM SELECTOR PLAN 2
- continue with psych recommendations  - psych fu requested as pt. medically cleared to return back to psych

## 2017-01-02 NOTE — PROGRESS NOTE ADULT - SUBJECTIVE AND OBJECTIVE BOX
Patient is a 51y old  Female who presents with a chief complaint of syncope    OVERNIGHT EVENTS: no overnight event     REVIEW OF SYSTEMS: denies chest pain/SOB, diaphoresis, no F/C, cough, dizziness, headache, blurry vision, nausea, vomiting, abdominal pain. Rest unremarkable     MEDICATIONS  (STANDING):  docusate sodium 100milliGRAM(s) Oral two times a day  atorvastatin 20milliGRAM(s) Oral at bedtime    MEDICATIONS  (PRN):  LORazepam     Tablet 1milliGRAM(s) Oral every 6 hours PRN Agitation  LORazepam   Injectable 2milliGRAM(s) IntraMuscular once PRN Agitation  haloperidol     Tablet 5milliGRAM(s) Oral every 6 hours PRN agitation/psychosis  haloperidol    Injectable 5milliGRAM(s) IntraMuscular once PRN agitation  diphenhydrAMINE   Capsule 50milliGRAM(s) Oral every 6 hours PRN Extrapyramidal prophylaxis  diphenhydrAMINE   Injectable 50milliGRAM(s) IntraMuscular once PRN agitation  acetaminophen   Tablet. 650milliGRAM(s) Oral every 6 hours PRN Moderate Pain (4 - 6)  aluminum hydroxide/magnesium hydroxide/simethicone Suspension 30milliLiter(s) Oral every 6 hours PRN Dyspepsia      Allergies    ampicillin (Unknown)    Intolerances        T(F): 98.2, Max: 99.2 (01-01 @ 16:50)  HR: 69 (69 - 74)  BP: 108/65 (102/51 - 115/64)  RR: 18 (17 - 18)  SpO2: 97% (96% - 99%)  Wt(kg): --    PHYSICAL EXAM:  GENERAL: NAD, well-groomed, well-developed  HEAD:  Atraumatic, Normocephalic  EYES: EOMI, PERRLA, conjunctiva and sclera clear  ENMT: No tonsillar erythema, exudates, or enlargement; Moist mucous membranes, Good dentition, No lesions  NECK: Supple, No JVD, Normal thyroid  NERVOUS SYSTEM:  Alert & Oriented X3, Good concentration; Motor Strength 5/5 B/L upper and lower extremities; DTRs 2+ intact and symmetric  CHEST/LUNG: Clear to percussion bilaterally; No rales, rhonchi, wheezing, or rubs BL  HEART: Regular rate and rhythm; No murmurs, rubs, or gallops  ABDOMEN: Soft, Nontender, Nondistended; Bowel sounds present  EXTREMITIES:  2+ Peripheral Pulses, No clubbing, cyanosis, or edema BL LE  LYMPH: No lymphadenopathy noted  SKIN: No rashes or lesions    LABS:              Cultures;   CAPILLARY BLOOD GLUCOSE    Lipid panel:     CARDIAC MARKERS ( 01 Jan 2017 06:45 )  x     / x     / 537 U/L / x     / x            RADIOLOGY & ADDITIONAL TESTS:    Imaging Personally Reviewed:  [ x] YES      Consultant(s) Notes Reviewed:  [x ] YES     Care Discussed with [x ] Consultants [X ] Patient [ ] Family  [x ]    [x ]  Other; RN

## 2017-01-03 ENCOUNTER — INPATIENT (INPATIENT)
Facility: HOSPITAL | Age: 52
LOS: 1 days | Discharge: HOME | End: 2017-01-05
Attending: PSYCHIATRY & NEUROLOGY | Admitting: PSYCHIATRY & NEUROLOGY
Payer: MEDICAID

## 2017-01-03 VITALS
WEIGHT: 182.98 LBS | HEIGHT: 63 IN | DIASTOLIC BLOOD PRESSURE: 78 MMHG | SYSTOLIC BLOOD PRESSURE: 137 MMHG | TEMPERATURE: 97 F | HEART RATE: 83 BPM | RESPIRATION RATE: 17 BRPM

## 2017-01-03 DIAGNOSIS — Z98.890 OTHER SPECIFIED POSTPROCEDURAL STATES: Chronic | ICD-10-CM

## 2017-01-03 RX ORDER — ACETAMINOPHEN 500 MG
650 TABLET ORAL EVERY 6 HOURS
Qty: 0 | Refills: 0 | Status: DISCONTINUED | OUTPATIENT
Start: 2017-01-03 | End: 2017-01-05

## 2017-01-03 RX ORDER — ATORVASTATIN CALCIUM 80 MG/1
20 TABLET, FILM COATED ORAL AT BEDTIME
Qty: 0 | Refills: 0 | Status: DISCONTINUED | OUTPATIENT
Start: 2017-01-03 | End: 2017-01-05

## 2017-01-03 RX ORDER — RISPERIDONE 4 MG/1
0.5 TABLET ORAL
Qty: 0 | Refills: 0 | Status: DISCONTINUED | OUTPATIENT
Start: 2017-01-03 | End: 2017-01-05

## 2017-01-03 RX ORDER — DIPHENHYDRAMINE HCL 50 MG
50 CAPSULE ORAL ONCE
Qty: 0 | Refills: 0 | Status: DISCONTINUED | OUTPATIENT
Start: 2017-01-03 | End: 2017-01-05

## 2017-01-03 RX ORDER — HALOPERIDOL DECANOATE 100 MG/ML
2.5 INJECTION INTRAMUSCULAR EVERY 6 HOURS
Qty: 0 | Refills: 0 | Status: DISCONTINUED | OUTPATIENT
Start: 2017-01-03 | End: 2017-01-05

## 2017-01-03 RX ORDER — HALOPERIDOL DECANOATE 100 MG/ML
5 INJECTION INTRAMUSCULAR ONCE
Qty: 0 | Refills: 0 | Status: DISCONTINUED | OUTPATIENT
Start: 2017-01-03 | End: 2017-01-05

## 2017-01-03 RX ORDER — DIPHENHYDRAMINE HCL 50 MG
25 CAPSULE ORAL EVERY 6 HOURS
Qty: 0 | Refills: 0 | Status: DISCONTINUED | OUTPATIENT
Start: 2017-01-03 | End: 2017-01-05

## 2017-01-03 RX ADMIN — RISPERIDONE 0.5 MILLIGRAM(S): 4 TABLET ORAL at 21:02

## 2017-01-03 RX ADMIN — ATORVASTATIN CALCIUM 20 MILLIGRAM(S): 80 TABLET, FILM COATED ORAL at 21:02

## 2017-01-03 NOTE — PROGRESS NOTE ADULT - ASSESSMENT
50 y/o female who's initially here for Decompensation 2/2 to Non-compliance to meds for Bipolar Disorder, is being admitted from Psych Unit to Telemetry s/p Fall/Syncope. TTE- EF 60-65% essentially normal

## 2017-01-03 NOTE — PROGRESS NOTE ADULT - SUBJECTIVE AND OBJECTIVE BOX
Patient is a 51y old  Female who presents with a chief complaint of     INTERVAL HPI/OVERNIGHT EVENTS:    MEDICATIONS  (STANDING):  docusate sodium 100milliGRAM(s) Oral two times a day  atorvastatin 20milliGRAM(s) Oral at bedtime  risperiDONE   Tablet 0.5milliGRAM(s) Oral two times a day    MEDICATIONS  (PRN):  LORazepam     Tablet 1milliGRAM(s) Oral every 6 hours PRN Agitation  LORazepam   Injectable 2milliGRAM(s) IntraMuscular once PRN Agitation  haloperidol     Tablet 5milliGRAM(s) Oral every 6 hours PRN agitation/psychosis  haloperidol    Injectable 5milliGRAM(s) IntraMuscular once PRN agitation  diphenhydrAMINE   Capsule 50milliGRAM(s) Oral every 6 hours PRN Extrapyramidal prophylaxis  diphenhydrAMINE   Injectable 50milliGRAM(s) IntraMuscular once PRN agitation  acetaminophen   Tablet. 650milliGRAM(s) Oral every 6 hours PRN Moderate Pain (4 - 6)  aluminum hydroxide/magnesium hydroxide/simethicone Suspension 30milliLiter(s) Oral every 6 hours PRN Dyspepsia      Allergies    ampicillin (Unknown)    Intolerances        REVIEW OF SYSTEMS:  CONSTITUTIONAL: No fever, weight loss, or fatigue  EYES: No eye pain, visual disturbances, or discharge  ENMT:  No difficulty hearing, tinnitus, vertigo; No sinus or throat pain  NECK: No pain or stiffness  BREASTS: No pain, masses, or nipple discharge  RESPIRATORY: No cough, wheezing, chills or hemoptysis; No shortness of breath  CARDIOVASCULAR: No chest pain, palpitations, dizziness, or leg swelling  GASTROINTESTINAL: No abdominal or epigastric pain. No nausea, vomiting, or hematemesis; No diarrhea or constipation. No melena or hematochezia.  GENITOURINARY: No dysuria, frequency, hematuria, or incontinence  NEUROLOGICAL: No headaches, memory loss, loss of strength, numbness, or tremors  SKIN: No itching, burning, rashes, or lesions   LYMPH NODES: No enlarged glands  ENDOCRINE: No heat or cold intolerance; No hair loss  MUSCULOSKELETAL: No joint pain or swelling; No muscle, back, or extremity pain  PSYCHIATRIC: No depression, anxiety, mood swings, or difficulty sleeping  HEME/LYMPH: No easy bruising, or bleeding gums  ALLERGY AND IMMUNOLOGIC: No hives or eczema    Vital Signs Last 24 Hrs  T(C): 37, Max: 37 (01-02 @ 18:12)  T(F): 98.6, Max: 98.6 (01-02 @ 18:12)  HR: 77 (66 - 77)  BP: 98/47 (91/53 - 106/57)  BP(mean): --  RR: 16 (16 - 18)  SpO2: 99% (98% - 100%)    PHYSICAL EXAM:  GENERAL: NAD, well-groomed, well-developed  HEAD:  Atraumatic, Normocephalic  EYES: EOMI, PERRLA, conjunctiva and sclera clear  ENMT: No tonsillar erythema, exudates, or enlargement; Moist mucous membranes, Good dentition, No lesions  NECK: Supple, No JVD, Normal thyroid  NERVOUS SYSTEM:  Alert & Oriented X3, Good concentration; Motor Strength 5/5 B/L upper and lower extremities; DTRs 2+ intact and symmetric  CHEST/LUNG: Clear to percussion bilaterally; No rales, rhonchi, wheezing, or rubs  HEART: Regular rate and rhythm; No murmurs, rubs, or gallops  ABDOMEN: Soft, Nontender, Nondistended; Bowel sounds present  EXTREMITIES:  2+ Peripheral Pulses, No clubbing, cyanosis, or edema  LYMPH: No lymphadenopathy noted  SKIN: No rashes or lesions    LABS:              CAPILLARY BLOOD GLUCOSE      RADIOLOGY & ADDITIONAL TESTS:    Imaging Personally Reviewed:  [ ] YES  [ ] NO    Consultant(s) Notes Reviewed:  [ ] YES  [ ] NO    Care Discussed with Consultants/Other Providers [ ] YES  [ ] NO Patient is a 51y old  Female who presents with a chief complaint of having passed out in inpatient psychiatry    INTERVAL HPI/OVERNIGHT EVENTS:  no chest pain or palpitations, no nausea or vomiting.  +Difficulty sleeping at night.  sleeping during the day  MEDICATIONS  (STANDING):  docusate sodium 100milliGRAM(s) Oral two times a day  atorvastatin 20milliGRAM(s) Oral at bedtime  risperiDONE   Tablet 0.5milliGRAM(s) Oral two times a day    MEDICATIONS  (PRN):  LORazepam     Tablet 1milliGRAM(s) Oral every 6 hours PRN Agitation  LORazepam   Injectable 2milliGRAM(s) IntraMuscular once PRN Agitation  haloperidol     Tablet 5milliGRAM(s) Oral every 6 hours PRN agitation/psychosis  haloperidol    Injectable 5milliGRAM(s) IntraMuscular once PRN agitation  diphenhydrAMINE   Capsule 50milliGRAM(s) Oral every 6 hours PRN Extrapyramidal prophylaxis  diphenhydrAMINE   Injectable 50milliGRAM(s) IntraMuscular once PRN agitation  acetaminophen   Tablet. 650milliGRAM(s) Oral every 6 hours PRN Moderate Pain (4 - 6)  aluminum hydroxide/magnesium hydroxide/simethicone Suspension 30milliLiter(s) Oral every 6 hours PRN Dyspepsia      Allergies    ampicillin (Unknown)    Intolerances        REVIEW OF SYSTEMS: denies chest pain/SOB, diaphoresis, no F/C, cough, dizziness, headache, blurry vision, nausea, vomiting, abdominal pain. Rest unremarkable     Vital Signs Last 24 Hrs  T(C): 37, Max: 37 (01-02 @ 18:12)  T(F): 98.6, Max: 98.6 (01-02 @ 18:12)  HR: 77 (66 - 77)  BP: 98/47 (91/53 - 106/57)  BP(mean): --  RR: 16 (16 - 18)  SpO2: 99% (98% - 100%)    PHYSICAL EXAM:  GENERAL: NAD, well-groomed, well-developed  HEAD:  Atraumatic, Normocephalic  EYES: EOMI, PERRLA, conjunctiva and sclera clear  ENMT: No tonsillar erythema, exudates, or enlargement; Moist mucous membranes, Good dentition, No lesions  NECK: Supple, No JVD, Normal thyroid  NERVOUS SYSTEM:  Alert & Oriented X3, Good concentration; Motor Strength 5/5 B/L upper and lower extremities; DTRs 2+ intact and symmetric  CHEST/LUNG: Clear to percussion bilaterally; No rales, rhonchi, wheezing, or rubs BL  HEART: Regular rate and rhythm; No murmurs, rubs, or gallops  ABDOMEN: Soft, Nontender, Nondistended; Bowel sounds present  EXTREMITIES:  2+ Peripheral Pulses, No clubbing, cyanosis, or edema BL LE  LYMPH: No lymphadenopathy noted  SKIN: No rashes or lesions    LABS:              CAPILLARY BLOOD GLUCOSE      RADIOLOGY & ADDITIONAL TESTS:    Imaging Personally Reviewed:  [xYES  [ ] NO    Consultant(s) Notes Reviewed:  [ x] YES  [ ] NO    Care Discussed with Consultants/Other Providers x[ ] YES  [ ] NO

## 2017-01-03 NOTE — DISCHARGE NOTE ADULT - MEDICATION SUMMARY - MEDICATIONS TO CHANGE
I will SWITCH the dose or number of times a day I take the medications listed below when I get home from the hospital:    risperiDONE 1 mg oral tablet  -- 1 tab(s) by mouth 2 times a day for mood stabilization

## 2017-01-03 NOTE — DISCHARGE NOTE ADULT - MEDICATION SUMMARY - MEDICATIONS TO STOP TAKING
I will STOP taking the medications listed below when I get home from the hospital:    phenylephrine 0.25%-3% rectal ointment  -- 1 application rectally 2 times a day, As needed, hemorrhoids

## 2017-01-03 NOTE — DISCHARGE NOTE ADULT - HOSPITAL COURSE
52 y/o female who's initially here for Decompensation 2/2 to Non-compliance to meds for Bipolar Disorder, is being admitted from Psych Unit to Telemetry s/p Fall/Syncope. TTE- EF 60-65%.    No cardiac arrythmia on psych.  stable for transfer to  for further psychiatric treatment

## 2017-01-03 NOTE — PROGRESS NOTE ADULT - PROBLEM SELECTOR PLAN 3
- PT eval pending
- PT recommend discharge home with outpatient pt for gait training (consider continuing pt regimen on inpatient psych)
PT eval

## 2017-01-03 NOTE — DISCHARGE NOTE ADULT - MEDICATION SUMMARY - MEDICATIONS TO TAKE
I will START or STAY ON the medications listed below when I get home from the hospital:    acetaminophen 325 mg oral tablet  -- 2 tab(s) by mouth every 6 hours, As needed, Moderate Pain (4 - 6)  -- Indication: For pain    aluminum hydroxide-magnesium hydroxide 200 mg-200 mg/5 mL oral suspension  -- 30 milliliter(s) by mouth every 6 hours, As needed, Dyspepsia  -- Indication: For dyspepsia    LORazepam 1 mg oral tablet  -- 1 tab(s) by mouth every 6 hours, As needed, Agitation  -- Indication: For anxiety/agitation    diphenhydrAMINE 50 mg oral capsule  -- 1 cap(s) by mouth every 6 hours, As needed, Extrapyramidal prophylaxis  -- Indication: For extrapyramidal symptoms    atorvastatin 20 mg oral tablet  -- 1 tab(s) by mouth once a day (at bedtime)  -- Indication: For hypercholesterolemia    haloperidol 5 mg oral tablet  -- 1 tab(s) by mouth every 6 hours, As needed, agitation/psychosis  -- Indication: For psychosis    risperiDONE 0.5 mg oral tablet  -- 1 tab(s) by mouth 2 times a day  -- Indication: For behavior    docusate sodium 100 mg oral capsule  -- 1 cap(s) by mouth 2 times a day for constipation  -- Indication: For constipation

## 2017-01-03 NOTE — DISCHARGE NOTE ADULT - CARE PLAN
Principal Discharge DX:	Vasovagal syncope  Goal:	tte normal  Instructions for follow-up, activity and diet:	low cholesterol diet, transfer to inpatient psych for further management  Secondary Diagnosis:	Psychiatric disorder  Goal:	as per psych  Secondary Diagnosis:	Fall, subsequent encounter  Secondary Diagnosis:	Bipolar disorder, current episode depressed, severe, with psychotic features

## 2017-01-03 NOTE — DISCHARGE NOTE ADULT - PATIENT PORTAL LINK FT
“You can access the Follow[facility] Patient Portal, offered by [facility], by registering with the following website: [email or web address]”

## 2017-01-03 NOTE — DISCHARGE NOTE ADULT - SECONDARY DIAGNOSIS.
Psychiatric disorder Fall, subsequent encounter Bipolar disorder, current episode depressed, severe, with psychotic features

## 2017-01-04 PROCEDURE — 99233 SBSQ HOSP IP/OBS HIGH 50: CPT

## 2017-01-04 RX ADMIN — RISPERIDONE 0.5 MILLIGRAM(S): 4 TABLET ORAL at 21:09

## 2017-01-04 RX ADMIN — ATORVASTATIN CALCIUM 20 MILLIGRAM(S): 80 TABLET, FILM COATED ORAL at 21:09

## 2017-01-04 RX ADMIN — RISPERIDONE 0.5 MILLIGRAM(S): 4 TABLET ORAL at 08:55

## 2017-01-05 VITALS
DIASTOLIC BLOOD PRESSURE: 76 MMHG | RESPIRATION RATE: 17 BRPM | TEMPERATURE: 100 F | SYSTOLIC BLOOD PRESSURE: 133 MMHG | HEART RATE: 91 BPM | OXYGEN SATURATION: 95 %

## 2017-01-05 PROCEDURE — 99239 HOSP IP/OBS DSCHRG MGMT >30: CPT

## 2017-01-05 RX ORDER — RISPERIDONE 4 MG/1
1 TABLET ORAL
Qty: 60 | Refills: 0 | OUTPATIENT
Start: 2017-01-05 | End: 2017-02-04

## 2017-01-05 RX ORDER — ATORVASTATIN CALCIUM 80 MG/1
1 TABLET, FILM COATED ORAL
Qty: 30 | Refills: 0 | OUTPATIENT
Start: 2017-01-05 | End: 2017-02-04

## 2017-01-05 RX ORDER — IBUPROFEN 200 MG
400 TABLET ORAL EVERY 6 HOURS
Qty: 0 | Refills: 0 | Status: DISCONTINUED | OUTPATIENT
Start: 2017-01-05 | End: 2017-01-05

## 2017-01-05 RX ADMIN — RISPERIDONE 0.5 MILLIGRAM(S): 4 TABLET ORAL at 09:21

## 2017-01-05 RX ADMIN — ATORVASTATIN CALCIUM 20 MILLIGRAM(S): 80 TABLET, FILM COATED ORAL at 20:13

## 2017-01-05 RX ADMIN — RISPERIDONE 0.5 MILLIGRAM(S): 4 TABLET ORAL at 20:13

## 2017-01-05 RX ADMIN — Medication 400 MILLIGRAM(S): at 14:23

## 2017-01-05 RX ADMIN — Medication 400 MILLIGRAM(S): at 13:52

## 2017-01-10 ENCOUNTER — INPATIENT (INPATIENT)
Facility: HOSPITAL | Age: 52
LOS: 7 days | Discharge: HOME | End: 2017-01-18
Attending: PSYCHIATRY & NEUROLOGY | Admitting: PSYCHIATRY & NEUROLOGY
Payer: MEDICAID

## 2017-01-10 VITALS
RESPIRATION RATE: 19 BRPM | HEART RATE: 102 BPM | SYSTOLIC BLOOD PRESSURE: 140 MMHG | TEMPERATURE: 99 F | DIASTOLIC BLOOD PRESSURE: 85 MMHG | OXYGEN SATURATION: 99 %

## 2017-01-10 DIAGNOSIS — E78.00 PURE HYPERCHOLESTEROLEMIA, UNSPECIFIED: ICD-10-CM

## 2017-01-10 DIAGNOSIS — G89.29 OTHER CHRONIC PAIN: ICD-10-CM

## 2017-01-10 DIAGNOSIS — F31.9 BIPOLAR DISORDER, UNSPECIFIED: ICD-10-CM

## 2017-01-10 DIAGNOSIS — Z98.890 OTHER SPECIFIED POSTPROCEDURAL STATES: Chronic | ICD-10-CM

## 2017-01-10 DIAGNOSIS — F25.0 SCHIZOAFFECTIVE DISORDER, BIPOLAR TYPE: ICD-10-CM

## 2017-01-10 DIAGNOSIS — Z56.0 UNEMPLOYMENT, UNSPECIFIED: ICD-10-CM

## 2017-01-10 DIAGNOSIS — E66.3 OVERWEIGHT: ICD-10-CM

## 2017-01-10 LAB
ALBUMIN SERPL ELPH-MCNC: 3.7 G/DL — SIGNIFICANT CHANGE UP (ref 3.3–5)
ALP SERPL-CCNC: 65 U/L — SIGNIFICANT CHANGE UP (ref 40–120)
ALT FLD-CCNC: 36 U/L — SIGNIFICANT CHANGE UP (ref 12–78)
AMPHET UR-MCNC: NEGATIVE — SIGNIFICANT CHANGE UP
ANION GAP SERPL CALC-SCNC: 10 MMOL/L — SIGNIFICANT CHANGE UP (ref 5–17)
APAP SERPL-MCNC: < 2 UG/ML (ref 10–30)
APPEARANCE UR: CLEAR — SIGNIFICANT CHANGE UP
AST SERPL-CCNC: 25 U/L — SIGNIFICANT CHANGE UP (ref 15–37)
BACTERIA # UR AUTO: ABNORMAL
BARBITURATES UR SCN-MCNC: NEGATIVE — SIGNIFICANT CHANGE UP
BASOPHILS # BLD AUTO: 0.1 K/UL — SIGNIFICANT CHANGE UP (ref 0–0.2)
BASOPHILS NFR BLD AUTO: 1.6 % — SIGNIFICANT CHANGE UP (ref 0–2)
BENZODIAZ UR-MCNC: NEGATIVE — SIGNIFICANT CHANGE UP
BILIRUB SERPL-MCNC: 0.1 MG/DL — LOW (ref 0.2–1.2)
BILIRUB UR-MCNC: NEGATIVE — SIGNIFICANT CHANGE UP
BUN SERPL-MCNC: 12 MG/DL — SIGNIFICANT CHANGE UP (ref 7–23)
CALCIUM SERPL-MCNC: 9.3 MG/DL — SIGNIFICANT CHANGE UP (ref 8.5–10.1)
CHLORIDE SERPL-SCNC: 109 MMOL/L — HIGH (ref 96–108)
CO2 SERPL-SCNC: 26 MMOL/L — SIGNIFICANT CHANGE UP (ref 22–31)
COCAINE METAB.OTHER UR-MCNC: NEGATIVE — SIGNIFICANT CHANGE UP
COLOR SPEC: YELLOW — SIGNIFICANT CHANGE UP
CREAT SERPL-MCNC: 1.08 MG/DL — SIGNIFICANT CHANGE UP (ref 0.5–1.3)
DIFF PNL FLD: ABNORMAL
EOSINOPHIL # BLD AUTO: 0 K/UL — SIGNIFICANT CHANGE UP (ref 0–0.5)
EOSINOPHIL NFR BLD AUTO: 0.2 % — SIGNIFICANT CHANGE UP (ref 0–6)
EPI CELLS # UR: SIGNIFICANT CHANGE UP
ETHANOL SERPL-MCNC: <10 MG/DL — SIGNIFICANT CHANGE UP (ref 0–10)
GLUCOSE SERPL-MCNC: 121 MG/DL — HIGH (ref 70–99)
GLUCOSE UR QL: NEGATIVE MG/DL — SIGNIFICANT CHANGE UP
HCG SERPL-ACNC: <1 MIU/ML — SIGNIFICANT CHANGE UP
HCT VFR BLD CALC: 36.1 % — SIGNIFICANT CHANGE UP (ref 34.5–45)
HGB BLD-MCNC: 12.5 G/DL — SIGNIFICANT CHANGE UP (ref 11.5–15.5)
KETONES UR-MCNC: NEGATIVE — SIGNIFICANT CHANGE UP
LEUKOCYTE ESTERASE UR-ACNC: ABNORMAL
LYMPHOCYTES # BLD AUTO: 2.1 K/UL — SIGNIFICANT CHANGE UP (ref 1–3.3)
LYMPHOCYTES # BLD AUTO: 25.1 % — SIGNIFICANT CHANGE UP (ref 13–44)
MCHC RBC-ENTMCNC: 29.7 PG — SIGNIFICANT CHANGE UP (ref 27–34)
MCHC RBC-ENTMCNC: 34.7 GM/DL — SIGNIFICANT CHANGE UP (ref 32–36)
MCV RBC AUTO: 85.6 FL — SIGNIFICANT CHANGE UP (ref 80–100)
METHADONE UR-MCNC: NEGATIVE — SIGNIFICANT CHANGE UP
MONOCYTES # BLD AUTO: 0.8 K/UL — SIGNIFICANT CHANGE UP (ref 0–0.9)
MONOCYTES NFR BLD AUTO: 9.1 % — SIGNIFICANT CHANGE UP (ref 2–14)
NEUTROPHILS # BLD AUTO: 5.4 K/UL — SIGNIFICANT CHANGE UP (ref 1.8–7.4)
NEUTROPHILS NFR BLD AUTO: 64 % — SIGNIFICANT CHANGE UP (ref 43–77)
NITRITE UR-MCNC: NEGATIVE — SIGNIFICANT CHANGE UP
OPIATES UR-MCNC: NEGATIVE — SIGNIFICANT CHANGE UP
PCP SPEC-MCNC: SIGNIFICANT CHANGE UP
PCP UR-MCNC: NEGATIVE — SIGNIFICANT CHANGE UP
PH UR: 8 — SIGNIFICANT CHANGE UP (ref 4.8–8)
PLATELET # BLD AUTO: 253 K/UL — SIGNIFICANT CHANGE UP (ref 150–400)
POTASSIUM SERPL-MCNC: 3.9 MMOL/L — SIGNIFICANT CHANGE UP (ref 3.5–5.3)
POTASSIUM SERPL-SCNC: 3.9 MMOL/L — SIGNIFICANT CHANGE UP (ref 3.5–5.3)
PROT SERPL-MCNC: 7.8 GM/DL — SIGNIFICANT CHANGE UP (ref 6–8.3)
PROT UR-MCNC: NEGATIVE MG/DL — SIGNIFICANT CHANGE UP
RBC # BLD: 4.22 M/UL — SIGNIFICANT CHANGE UP (ref 3.8–5.2)
RBC # FLD: 13.5 % — SIGNIFICANT CHANGE UP (ref 11–15)
RBC CASTS # UR COMP ASSIST: SIGNIFICANT CHANGE UP /HPF (ref 0–4)
SALICYLATES SERPL-MCNC: <1.7 MG/DL — LOW (ref 2.8–20)
SODIUM SERPL-SCNC: 145 MMOL/L — SIGNIFICANT CHANGE UP (ref 135–145)
SP GR SPEC: 1.01 — SIGNIFICANT CHANGE UP (ref 1.01–1.02)
THC UR QL: NEGATIVE — SIGNIFICANT CHANGE UP
UROBILINOGEN FLD QL: NEGATIVE MG/DL — SIGNIFICANT CHANGE UP
WBC # BLD: 8.5 K/UL — SIGNIFICANT CHANGE UP (ref 3.8–10.5)
WBC # FLD AUTO: 8.5 K/UL — SIGNIFICANT CHANGE UP (ref 3.8–10.5)
WBC UR QL: SIGNIFICANT CHANGE UP

## 2017-01-10 PROCEDURE — 99285 EMERGENCY DEPT VISIT HI MDM: CPT

## 2017-01-10 SDOH — ECONOMIC STABILITY - INCOME SECURITY: UNEMPLOYMENT, UNSPECIFIED: Z56.0

## 2017-01-10 NOTE — ED ADULT TRIAGE NOTE - CHIEF COMPLAINT QUOTE
as per EMS pt being aggressive at home, breaking things and scaring the children who live in the home. pt has history of bipolar disorder. pt is non complaint with psychiatric medications. pt denies sucidal or homicidal ideation.

## 2017-01-10 NOTE — ED ADULT NURSE NOTE - OBJECTIVE STATEMENT
pt calm, cooperative.  pt stated she had an argument with a family member, will not identify family member at this time.

## 2017-01-10 NOTE — ED PROVIDER NOTE - OBJECTIVE STATEMENT
Pt is a 52 yo Pt is a 50 yo lady w pmhx of depression (bipolar per ems) who presents to the ED with aggression in house. Sister called EMS and said pt is off her medications and is aggressive and scaring the children. Pt says she ran out of risperdal yesterday due to insurance issues. Denies any SI, HI, or AVH, saying that she is "too beautiful to die." Is pacing around the room barefoot, easily aggravated. Denies any medical complaints.

## 2017-01-10 NOTE — ED PROVIDER NOTE - MEDICAL DECISION MAKING DETAILS
Ddx: Manic episode/ psych workup  Plan: psych clearance, psych consult. Pt medically cleared for psych eval. Ddx: Manic episode/ psych workup  Plan: psych clearance, psych consult. Pt medically cleared for psych dispo to home or admission.

## 2017-01-10 NOTE — ED PROVIDER NOTE - PROGRESS NOTE DETAILS
Of note, a patient named MEME AMEZQUITA with same birthday also has a chart in our hospital, consistent with ED staff who remembered patient being admitted 12/30/16.

## 2017-01-11 DIAGNOSIS — R69 ILLNESS, UNSPECIFIED: ICD-10-CM

## 2017-01-11 DIAGNOSIS — F31.2 BIPOLAR DISORDER, CURRENT EPISODE MANIC SEVERE WITH PSYCHOTIC FEATURES: ICD-10-CM

## 2017-01-11 LAB
CULTURE RESULTS: SIGNIFICANT CHANGE UP
SPECIMEN SOURCE: SIGNIFICANT CHANGE UP

## 2017-01-11 PROCEDURE — 99233 SBSQ HOSP IP/OBS HIGH 50: CPT

## 2017-01-11 PROCEDURE — 90792 PSYCH DIAG EVAL W/MED SRVCS: CPT | Mod: GT

## 2017-01-11 PROCEDURE — 99285 EMERGENCY DEPT VISIT HI MDM: CPT | Mod: GT

## 2017-01-11 RX ORDER — HALOPERIDOL DECANOATE 100 MG/ML
5 INJECTION INTRAMUSCULAR ONCE
Qty: 0 | Refills: 0 | Status: DISCONTINUED | OUTPATIENT
Start: 2017-01-11 | End: 2017-01-18

## 2017-01-11 RX ORDER — ATORVASTATIN CALCIUM 80 MG/1
20 TABLET, FILM COATED ORAL AT BEDTIME
Qty: 0 | Refills: 0 | Status: DISCONTINUED | OUTPATIENT
Start: 2017-01-11 | End: 2017-01-18

## 2017-01-11 RX ORDER — HALOPERIDOL DECANOATE 100 MG/ML
5 INJECTION INTRAMUSCULAR ONCE
Qty: 0 | Refills: 0 | Status: COMPLETED | OUTPATIENT
Start: 2017-01-11 | End: 2017-01-11

## 2017-01-11 RX ORDER — RISPERIDONE 4 MG/1
2 TABLET ORAL AT BEDTIME
Qty: 0 | Refills: 0 | Status: DISCONTINUED | OUTPATIENT
Start: 2017-01-11 | End: 2017-01-13

## 2017-01-11 RX ORDER — RISPERIDONE 4 MG/1
1 TABLET ORAL AT BEDTIME
Qty: 0 | Refills: 0 | Status: DISCONTINUED | OUTPATIENT
Start: 2017-01-11 | End: 2017-01-11

## 2017-01-11 RX ORDER — HALOPERIDOL DECANOATE 100 MG/ML
5 INJECTION INTRAMUSCULAR EVERY 6 HOURS
Qty: 0 | Refills: 0 | Status: DISCONTINUED | OUTPATIENT
Start: 2017-01-11 | End: 2017-01-18

## 2017-01-11 RX ORDER — DIPHENHYDRAMINE HCL 50 MG
50 CAPSULE ORAL EVERY 6 HOURS
Qty: 0 | Refills: 0 | Status: DISCONTINUED | OUTPATIENT
Start: 2017-01-11 | End: 2017-01-18

## 2017-01-11 RX ORDER — DIPHENHYDRAMINE HCL 50 MG
50 CAPSULE ORAL ONCE
Qty: 0 | Refills: 0 | Status: DISCONTINUED | OUTPATIENT
Start: 2017-01-11 | End: 2017-01-18

## 2017-01-11 RX ADMIN — HALOPERIDOL DECANOATE 5 MILLIGRAM(S): 100 INJECTION INTRAMUSCULAR at 02:53

## 2017-01-11 RX ADMIN — RISPERIDONE 2 MILLIGRAM(S): 4 TABLET ORAL at 20:15

## 2017-01-11 RX ADMIN — ATORVASTATIN CALCIUM 20 MILLIGRAM(S): 80 TABLET, FILM COATED ORAL at 20:16

## 2017-01-11 NOTE — ED BEHAVIORAL HEALTH ASSESSMENT NOTE - OTHER PAST PSYCHIATRIC HISTORY (INCLUDE DETAILS REGARDING ONSET, COURSE OF ILLNESS, INPATIENT/OUTPATIENT TREATMENT)
Patient. reports hx of Post partum depression with 2 of 3 children   reports multiple past hospitalizations- only elaborated on most recent at [facility]  Reports DX: Depression, Bipolar Disorder,

## 2017-01-11 NOTE — ED BEHAVIORAL HEALTH ASSESSMENT NOTE - CASE SUMMARY
52 y/o female with hx of Bipolar disorder , presented to ED after having an argument with sister and becoming aggressive at the home, breaking bottles.    pt is manic in the context of noncompliance with medication, and unable to care for herself.  she requires admission for safety and stabilization.

## 2017-01-11 NOTE — ED BEHAVIORAL HEALTH ASSESSMENT NOTE - SUMMARY
52 y/o female with hx of Bipolar disorder , presented to ED after having an argument with sister and becoming aggressive at the home, breaking bottles.  Patient with pressured speech, irritable, paranoid, grandiose, delusional.  Patient recently discharged last week from inpatient at [facility] and reports has not taken medications since discharge, missed her follow up appointment which was scheduled 3 days ago.  Patient is impulsive, has poor judgement, poor insight and not able to care for self at this time.  Patient in need of inpatient hospitalization for symptom and mood management and stabilization.

## 2017-01-11 NOTE — ED BEHAVIORAL HEALTH ASSESSMENT NOTE - DETAILS
Hospitalized 12/28/16 at [facility] Handoff given by Attending to  "I don't want to talk about my family details" Dr. Guy grande Handoff given by Attending to Dr. Gomez Tovar attending to follow up at [place] if needed- patient not able to recall

## 2017-01-11 NOTE — ED BEHAVIORAL HEALTH ASSESSMENT NOTE - PSYCHIATRIC ISSUES AND PLAN (INCLUDE STANDING AND PRN MEDICATION)
Risperdal 1mg po hs     Ativan 2mg po/ IM for agitation, Benadryl 50mg po/IM q6hrs PRN for EPS, Haldol 5mg PO/IM q6hrs prn for pschotic agitation

## 2017-01-11 NOTE — ED BEHAVIORAL HEALTH NOTE - BEHAVIORAL HEALTH NOTE
Telepsychiatry Encounter  I have visualized that the patient is on an arms-length 1:1.  I have visualized that the patient is in a private space.  I have confirmed with the patient that they understanding and agree to the evaluation being performed via Telepsychiatry.  I have discussed the above with Telepsychiatry Attending Dr. Edwards.    Writer spoke with [address] and bed availability for patient was confirmed. Patient will be admitted on an involuntary (9.39) basis to [address]. Writer spoke with ED MD and requested that form 9.39 be sent to worker prior to patient's admission to the unit.

## 2017-01-11 NOTE — ED BEHAVIORAL HEALTH ASSESSMENT NOTE - OTHER
family patient laying down in bed aggressive behaviors: broke 2 bottles in the home, easily agitated

## 2017-01-11 NOTE — ED BEHAVIORAL HEALTH ASSESSMENT NOTE - DESCRIPTION
Hyperlipidemia see HPI see ED provider note Given stat IM for agitation at 0245 SEE ED PROVIDER NOTE
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
## 2017-01-11 NOTE — ED BEHAVIORAL HEALTH ASSESSMENT NOTE - PRIMARY DX
Bipolar disorder, current episode manic severe with psychotic features Deferred diagnosis on axis II

## 2017-01-11 NOTE — ED BEHAVIORAL HEALTH ASSESSMENT NOTE - RISK ASSESSMENT
Risk factors:  impulsivity,  prior hospitalizations, absence of outpatient follow-up, limited insight into symptoms,  poor reactivity to stressors, low frustration tolerance, hx of  medication non- compliance.   Protective factors: Pt denies any active suicidal ideation/intent/plan, no hx of prior attempts,  no self-harm behaviors, no family hx,  has responsibility to family and others, identifies reasons for living, fear of death/dying due to pain/suffering, future oriented, high spirituality,  no active substance use, no access to firearms.     Based on risk assessment evaluation, Pt DOES appear to be at imminent risk of harm to self or others at this time.

## 2017-01-12 LAB
CHOLEST SERPL-MCNC: 143 MG/DL — SIGNIFICANT CHANGE UP (ref 10–199)
HBA1C BLD-MCNC: 6.4 % — HIGH (ref 4–5.6)
HDLC SERPL-MCNC: 35 MG/DL — LOW (ref 40–125)
LIPID PNL WITH DIRECT LDL SERPL: 92 MG/DL — SIGNIFICANT CHANGE UP
TOTAL CHOLESTEROL/HDL RATIO MEASUREMENT: 4.1 RATIO — SIGNIFICANT CHANGE UP (ref 3.3–7.1)
TRIGL SERPL-MCNC: 78 MG/DL — SIGNIFICANT CHANGE UP (ref 10–149)

## 2017-01-12 PROCEDURE — 99232 SBSQ HOSP IP/OBS MODERATE 35: CPT

## 2017-01-12 RX ADMIN — ATORVASTATIN CALCIUM 20 MILLIGRAM(S): 80 TABLET, FILM COATED ORAL at 20:05

## 2017-01-12 RX ADMIN — RISPERIDONE 2 MILLIGRAM(S): 4 TABLET ORAL at 20:05

## 2017-01-13 PROCEDURE — 99232 SBSQ HOSP IP/OBS MODERATE 35: CPT

## 2017-01-13 RX ORDER — RISPERIDONE 4 MG/1
3 TABLET ORAL AT BEDTIME
Qty: 0 | Refills: 0 | Status: DISCONTINUED | OUTPATIENT
Start: 2017-01-13 | End: 2017-01-17

## 2017-01-13 RX ADMIN — ATORVASTATIN CALCIUM 20 MILLIGRAM(S): 80 TABLET, FILM COATED ORAL at 20:41

## 2017-01-13 RX ADMIN — RISPERIDONE 3 MILLIGRAM(S): 4 TABLET ORAL at 20:41

## 2017-01-14 RX ADMIN — RISPERIDONE 3 MILLIGRAM(S): 4 TABLET ORAL at 20:23

## 2017-01-14 RX ADMIN — ATORVASTATIN CALCIUM 20 MILLIGRAM(S): 80 TABLET, FILM COATED ORAL at 20:23

## 2017-01-15 RX ADMIN — RISPERIDONE 3 MILLIGRAM(S): 4 TABLET ORAL at 20:27

## 2017-01-15 RX ADMIN — ATORVASTATIN CALCIUM 20 MILLIGRAM(S): 80 TABLET, FILM COATED ORAL at 20:27

## 2017-01-16 RX ORDER — DOCUSATE SODIUM 100 MG
100 CAPSULE ORAL
Qty: 0 | Refills: 0 | Status: DISCONTINUED | OUTPATIENT
Start: 2017-01-16 | End: 2017-01-17

## 2017-01-16 RX ADMIN — RISPERIDONE 3 MILLIGRAM(S): 4 TABLET ORAL at 20:18

## 2017-01-16 RX ADMIN — Medication 100 MILLIGRAM(S): at 20:18

## 2017-01-16 RX ADMIN — ATORVASTATIN CALCIUM 20 MILLIGRAM(S): 80 TABLET, FILM COATED ORAL at 20:18

## 2017-01-17 PROCEDURE — 99232 SBSQ HOSP IP/OBS MODERATE 35: CPT

## 2017-01-17 RX ORDER — RISPERIDONE 4 MG/1
4 TABLET ORAL AT BEDTIME
Qty: 0 | Refills: 0 | Status: DISCONTINUED | OUTPATIENT
Start: 2017-01-17 | End: 2017-01-18

## 2017-01-17 RX ORDER — DOCUSATE SODIUM 100 MG
1 CAPSULE ORAL
Qty: 30 | Refills: 0 | OUTPATIENT
Start: 2017-01-17 | End: 2017-02-16

## 2017-01-17 RX ORDER — RISPERIDONE 4 MG/1
1 TABLET ORAL
Qty: 30 | Refills: 0 | OUTPATIENT
Start: 2017-01-17 | End: 2017-02-16

## 2017-01-17 RX ORDER — ATORVASTATIN CALCIUM 80 MG/1
1 TABLET, FILM COATED ORAL
Qty: 30 | Refills: 0 | OUTPATIENT
Start: 2017-01-17 | End: 2017-02-16

## 2017-01-17 RX ORDER — DOCUSATE SODIUM 100 MG
100 CAPSULE ORAL AT BEDTIME
Qty: 0 | Refills: 0 | Status: DISCONTINUED | OUTPATIENT
Start: 2017-01-17 | End: 2017-01-18

## 2017-01-17 RX ADMIN — Medication 100 MILLIGRAM(S): at 20:12

## 2017-01-17 RX ADMIN — RISPERIDONE 4 MILLIGRAM(S): 4 TABLET ORAL at 20:12

## 2017-01-17 RX ADMIN — ATORVASTATIN CALCIUM 20 MILLIGRAM(S): 80 TABLET, FILM COATED ORAL at 20:12

## 2017-01-18 VITALS
HEART RATE: 83 BPM | SYSTOLIC BLOOD PRESSURE: 132 MMHG | RESPIRATION RATE: 16 BRPM | TEMPERATURE: 99 F | OXYGEN SATURATION: 96 % | DIASTOLIC BLOOD PRESSURE: 70 MMHG

## 2017-01-18 PROCEDURE — 73120 X-RAY EXAM OF HAND: CPT | Mod: 26,LT

## 2017-01-18 PROCEDURE — 99239 HOSP IP/OBS DSCHRG MGMT >30: CPT

## 2017-01-18 RX ORDER — IBUPROFEN 200 MG
400 TABLET ORAL EVERY 8 HOURS
Qty: 0 | Refills: 0 | Status: DISCONTINUED | OUTPATIENT
Start: 2017-01-18 | End: 2017-01-18

## 2017-01-18 RX ADMIN — ATORVASTATIN CALCIUM 20 MILLIGRAM(S): 80 TABLET, FILM COATED ORAL at 20:18

## 2017-01-18 RX ADMIN — RISPERIDONE 4 MILLIGRAM(S): 4 TABLET ORAL at 20:18

## 2017-01-18 RX ADMIN — Medication 100 MILLIGRAM(S): at 20:18

## 2017-01-24 DIAGNOSIS — M79.642 PAIN IN LEFT HAND: ICD-10-CM

## 2017-01-24 DIAGNOSIS — Z53.29 PROCEDURE AND TREATMENT NOT CARRIED OUT BECAUSE OF PATIENT'S DECISION FOR OTHER REASONS: ICD-10-CM

## 2017-01-24 DIAGNOSIS — F31.64 BIPOLAR DISORDER, CURRENT EPISODE MIXED, SEVERE, WITH PSYCHOTIC FEATURES: ICD-10-CM

## 2017-01-24 DIAGNOSIS — E78.5 HYPERLIPIDEMIA, UNSPECIFIED: ICD-10-CM

## 2017-01-24 DIAGNOSIS — Z91.14 PATIENT'S OTHER NONCOMPLIANCE WITH MEDICATION REGIMEN: ICD-10-CM

## 2017-01-24 DIAGNOSIS — Z88.1 ALLERGY STATUS TO OTHER ANTIBIOTIC AGENTS STATUS: ICD-10-CM

## 2017-01-24 DIAGNOSIS — F25.9 SCHIZOAFFECTIVE DISORDER, UNSPECIFIED: ICD-10-CM

## 2017-11-08 ENCOUNTER — EMERGENCY (EMERGENCY)
Facility: HOSPITAL | Age: 52
LOS: 0 days | Discharge: PSYCHIATRIC OR SUBSTANCE USE FACILITY | End: 2017-11-09
Attending: EMERGENCY MEDICINE
Payer: MEDICAID

## 2017-11-08 VITALS
TEMPERATURE: 98 F | HEART RATE: 146 BPM | DIASTOLIC BLOOD PRESSURE: 129 MMHG | OXYGEN SATURATION: 98 % | WEIGHT: 169.98 LBS | SYSTOLIC BLOOD PRESSURE: 162 MMHG | RESPIRATION RATE: 16 BRPM | HEIGHT: 63 IN

## 2017-11-08 DIAGNOSIS — Z98.890 OTHER SPECIFIED POSTPROCEDURAL STATES: Chronic | ICD-10-CM

## 2017-11-08 LAB
ALBUMIN SERPL ELPH-MCNC: 3.9 G/DL — SIGNIFICANT CHANGE UP (ref 3.3–5)
ALP SERPL-CCNC: 67 U/L — SIGNIFICANT CHANGE UP (ref 40–120)
ALT FLD-CCNC: 36 U/L — SIGNIFICANT CHANGE UP (ref 12–78)
ANION GAP SERPL CALC-SCNC: 14 MMOL/L — SIGNIFICANT CHANGE UP (ref 5–17)
APAP SERPL-MCNC: < 2 UG/ML (ref 10–30)
APPEARANCE UR: ABNORMAL
AST SERPL-CCNC: 36 U/L — SIGNIFICANT CHANGE UP (ref 15–37)
BASOPHILS # BLD AUTO: 0.1 K/UL — SIGNIFICANT CHANGE UP (ref 0–0.2)
BASOPHILS NFR BLD AUTO: 2 % — SIGNIFICANT CHANGE UP (ref 0–2)
BILIRUB SERPL-MCNC: 0.3 MG/DL — SIGNIFICANT CHANGE UP (ref 0.2–1.2)
BILIRUB UR-MCNC: NEGATIVE — SIGNIFICANT CHANGE UP
BUN SERPL-MCNC: 20 MG/DL — SIGNIFICANT CHANGE UP (ref 7–23)
CALCIUM SERPL-MCNC: 9 MG/DL — SIGNIFICANT CHANGE UP (ref 8.5–10.1)
CHLORIDE SERPL-SCNC: 102 MMOL/L — SIGNIFICANT CHANGE UP (ref 96–108)
CK SERPL-CCNC: 800 U/L — HIGH (ref 26–192)
CK SERPL-CCNC: 845 U/L — HIGH (ref 26–192)
CO2 SERPL-SCNC: 24 MMOL/L — SIGNIFICANT CHANGE UP (ref 22–31)
COLOR SPEC: YELLOW — SIGNIFICANT CHANGE UP
CREAT SERPL-MCNC: 1.03 MG/DL — SIGNIFICANT CHANGE UP (ref 0.5–1.3)
DIFF PNL FLD: ABNORMAL
EOSINOPHIL # BLD AUTO: 0 K/UL — SIGNIFICANT CHANGE UP (ref 0–0.5)
EOSINOPHIL NFR BLD AUTO: 0 % — SIGNIFICANT CHANGE UP (ref 0–6)
ETHANOL SERPL-MCNC: <10 MG/DL — SIGNIFICANT CHANGE UP (ref 0–10)
GLUCOSE SERPL-MCNC: 94 MG/DL — SIGNIFICANT CHANGE UP (ref 70–99)
GLUCOSE UR QL: NEGATIVE MG/DL — SIGNIFICANT CHANGE UP
HCG SERPL-ACNC: <1 MIU/ML — SIGNIFICANT CHANGE UP
HCT VFR BLD CALC: 42.9 % — SIGNIFICANT CHANGE UP (ref 34.5–45)
HGB BLD-MCNC: 13.7 G/DL — SIGNIFICANT CHANGE UP (ref 11.5–15.5)
KETONES UR-MCNC: ABNORMAL
LEUKOCYTE ESTERASE UR-ACNC: NEGATIVE — SIGNIFICANT CHANGE UP
LYMPHOCYTES # BLD AUTO: 2.1 K/UL — SIGNIFICANT CHANGE UP (ref 1–3.3)
LYMPHOCYTES # BLD AUTO: 28.2 % — SIGNIFICANT CHANGE UP (ref 13–44)
MAGNESIUM SERPL-MCNC: 2.2 MG/DL — SIGNIFICANT CHANGE UP (ref 1.6–2.6)
MCHC RBC-ENTMCNC: 28.4 PG — SIGNIFICANT CHANGE UP (ref 27–34)
MCHC RBC-ENTMCNC: 31.9 GM/DL — LOW (ref 32–36)
MCV RBC AUTO: 89 FL — SIGNIFICANT CHANGE UP (ref 80–100)
MONOCYTES # BLD AUTO: 0.6 K/UL — SIGNIFICANT CHANGE UP (ref 0–0.9)
MONOCYTES NFR BLD AUTO: 7.7 % — SIGNIFICANT CHANGE UP (ref 2–14)
NEUTROPHILS # BLD AUTO: 4.6 K/UL — SIGNIFICANT CHANGE UP (ref 1.8–7.4)
NEUTROPHILS NFR BLD AUTO: 62 % — SIGNIFICANT CHANGE UP (ref 43–77)
NITRITE UR-MCNC: NEGATIVE — SIGNIFICANT CHANGE UP
PCP SPEC-MCNC: SIGNIFICANT CHANGE UP
PH UR: 5 — SIGNIFICANT CHANGE UP (ref 5–8)
PLATELET # BLD AUTO: 298 K/UL — SIGNIFICANT CHANGE UP (ref 150–400)
POTASSIUM SERPL-MCNC: 3.6 MMOL/L — SIGNIFICANT CHANGE UP (ref 3.5–5.3)
POTASSIUM SERPL-SCNC: 3.6 MMOL/L — SIGNIFICANT CHANGE UP (ref 3.5–5.3)
PROT SERPL-MCNC: 9 GM/DL — HIGH (ref 6–8.3)
PROT UR-MCNC: 30 MG/DL
RBC # BLD: 4.82 M/UL — SIGNIFICANT CHANGE UP (ref 3.8–5.2)
RBC # FLD: 12.8 % — SIGNIFICANT CHANGE UP (ref 11–15)
SALICYLATES SERPL-MCNC: <1.7 MG/DL — LOW (ref 2.8–20)
SODIUM SERPL-SCNC: 140 MMOL/L — SIGNIFICANT CHANGE UP (ref 135–145)
SP GR SPEC: 1.02 — SIGNIFICANT CHANGE UP (ref 1.01–1.02)
TSH SERPL-MCNC: 0.67 UIU/ML — SIGNIFICANT CHANGE UP (ref 0.36–3.74)
UROBILINOGEN FLD QL: NEGATIVE MG/DL — SIGNIFICANT CHANGE UP
WBC # BLD: 7.4 K/UL — SIGNIFICANT CHANGE UP (ref 3.8–10.5)
WBC # FLD AUTO: 7.4 K/UL — SIGNIFICANT CHANGE UP (ref 3.8–10.5)

## 2017-11-08 PROCEDURE — 99285 EMERGENCY DEPT VISIT HI MDM: CPT

## 2017-11-08 PROCEDURE — 71010: CPT | Mod: 26

## 2017-11-08 PROCEDURE — 90792 PSYCH DIAG EVAL W/MED SRVCS: CPT | Mod: GT

## 2017-11-08 RX ORDER — SODIUM CHLORIDE 9 MG/ML
1000 INJECTION INTRAMUSCULAR; INTRAVENOUS; SUBCUTANEOUS ONCE
Qty: 0 | Refills: 0 | Status: COMPLETED | OUTPATIENT
Start: 2017-11-08 | End: 2017-11-08

## 2017-11-08 RX ORDER — SODIUM CHLORIDE 9 MG/ML
2000 INJECTION INTRAMUSCULAR; INTRAVENOUS; SUBCUTANEOUS ONCE
Qty: 0 | Refills: 0 | Status: COMPLETED | OUTPATIENT
Start: 2017-11-08 | End: 2017-11-08

## 2017-11-08 RX ADMIN — SODIUM CHLORIDE 3000 MILLILITER(S): 9 INJECTION INTRAMUSCULAR; INTRAVENOUS; SUBCUTANEOUS at 18:33

## 2017-11-08 RX ADMIN — Medication 1 TABLET(S): at 20:36

## 2017-11-08 RX ADMIN — SODIUM CHLORIDE 1000 MILLILITER(S): 9 INJECTION INTRAMUSCULAR; INTRAVENOUS; SUBCUTANEOUS at 19:49

## 2017-11-08 NOTE — ED PROVIDER NOTE - OBJECTIVE STATEMENT
50 y/o female hx manic/depressive bipolar on ?risperidal 3mg per pt 4mg per prescribing tab with poor compliance biba for agitation and not eating for several days. Per sister Brooklyn [phone], when pt not taking medication becomes aggressive/agitated, screams and can become violent (as with last admission earlier this year). Sister says pt was becoming agitated and screaming/aggressive. Pt denies si/hi/ah currently, states hasn't taken risperidal for some amount of time (doesn't know how long), denies any focal complaints/pain. Currently calm.

## 2017-11-08 NOTE — ED ADULT NURSE NOTE - CHIEF COMPLAINT QUOTE
Aggressive behavior, skips medication at times, yelling in home and not eating for days, sister in waiting room, siblings unable to stay together pt starts yelling at her

## 2017-11-08 NOTE — ED ADULT NURSE NOTE - OBJECTIVE STATEMENT
patient received, came here for aggressive behavior, as per er tech was brought in by sister, no family present at bedside this time. patient appears calm, refuses to answer questions.

## 2017-11-08 NOTE — ED ADULT NURSE NOTE - ED STAT RN HANDOFF DETAILS
pt stable , not in any distress, being transferred to [address] , report given  to LUANNE Durant

## 2017-11-08 NOTE — ED PROVIDER NOTE - CARE PLAN
Principal Discharge DX:	Psychosis, unspecified psychosis type  Secondary Diagnosis:	Infective urethritis

## 2017-11-08 NOTE — ED ADULT TRIAGE NOTE - CHIEF COMPLAINT QUOTE
Aggressive behavior, skips medication at times, yelling in home and not eating for days Aggressive behavior, skips medication at times, yelling in home and not eating for days, sister in waiting room, siblings unable to stay together pt starts yelling at her

## 2017-11-08 NOTE — ED PROVIDER NOTE - MEDICAL DECISION MAKING DETAILS
bipolar, agitated likely from medical noncompliance. initially tachy likely from agitation, improved. labs, fluids, for medical clearance. 1:1 observation. psych consult. bipolar, agitated likely from medical noncompliance. initially tachy likely from agitation, improved. labs, fluids, for medical clearance. 1:1 observation. psych consult.   patient has urinary tract infection and will need bactrim double strength tablets bid for 1 week. patient is otherwise medically clear for discharge home. Psych has evaluated her and determined she needs admission for laurent and psychosis. will transfer to [facility].

## 2017-11-09 ENCOUNTER — INPATIENT (INPATIENT)
Facility: HOSPITAL | Age: 52
LOS: 7 days | Discharge: HOME | End: 2017-11-17
Attending: PSYCHIATRY & NEUROLOGY | Admitting: PSYCHIATRY & NEUROLOGY
Payer: MEDICAID

## 2017-11-09 VITALS
OXYGEN SATURATION: 96 % | DIASTOLIC BLOOD PRESSURE: 71 MMHG | SYSTOLIC BLOOD PRESSURE: 136 MMHG | RESPIRATION RATE: 18 BRPM | HEART RATE: 83 BPM | TEMPERATURE: 100 F

## 2017-11-09 VITALS — TEMPERATURE: 99 F | WEIGHT: 169.76 LBS | HEIGHT: 63 IN | RESPIRATION RATE: 18 BRPM

## 2017-11-09 DIAGNOSIS — F31.64 BIPOLAR DISORDER, CURRENT EPISODE MIXED, SEVERE, WITH PSYCHOTIC FEATURES: ICD-10-CM

## 2017-11-09 DIAGNOSIS — Z98.890 OTHER SPECIFIED POSTPROCEDURAL STATES: Chronic | ICD-10-CM

## 2017-11-09 DIAGNOSIS — N34.2 OTHER URETHRITIS: ICD-10-CM

## 2017-11-09 DIAGNOSIS — F91.1 CONDUCT DISORDER, CHILDHOOD-ONSET TYPE: ICD-10-CM

## 2017-11-09 DIAGNOSIS — F31.9 BIPOLAR DISORDER, UNSPECIFIED: ICD-10-CM

## 2017-11-09 DIAGNOSIS — F91.8 OTHER CONDUCT DISORDERS: ICD-10-CM

## 2017-11-09 DIAGNOSIS — F29 UNSPECIFIED PSYCHOSIS NOT DUE TO A SUBSTANCE OR KNOWN PHYSIOLOGICAL CONDITION: ICD-10-CM

## 2017-11-09 LAB — CK SERPL-CCNC: 879 U/L — HIGH (ref 26–192)

## 2017-11-09 RX ORDER — LABETALOL HCL 100 MG
2.5 TABLET ORAL ONCE
Qty: 0 | Refills: 0 | Status: COMPLETED | OUTPATIENT
Start: 2017-11-09 | End: 2017-11-09

## 2017-11-09 RX ORDER — DIPHENHYDRAMINE HCL 50 MG
50 CAPSULE ORAL EVERY 6 HOURS
Qty: 0 | Refills: 0 | Status: DISCONTINUED | OUTPATIENT
Start: 2017-11-09 | End: 2017-11-17

## 2017-11-09 RX ORDER — DIPHENHYDRAMINE HCL 50 MG
50 CAPSULE ORAL ONCE
Qty: 0 | Refills: 0 | Status: DISCONTINUED | OUTPATIENT
Start: 2017-11-09 | End: 2017-11-17

## 2017-11-09 RX ORDER — HALOPERIDOL DECANOATE 100 MG/ML
5 INJECTION INTRAMUSCULAR EVERY 6 HOURS
Qty: 0 | Refills: 0 | Status: DISCONTINUED | OUTPATIENT
Start: 2017-11-09 | End: 2017-11-17

## 2017-11-09 RX ORDER — SODIUM CHLORIDE 9 MG/ML
2000 INJECTION INTRAMUSCULAR; INTRAVENOUS; SUBCUTANEOUS ONCE
Qty: 0 | Refills: 0 | Status: COMPLETED | OUTPATIENT
Start: 2017-11-09 | End: 2017-11-09

## 2017-11-09 RX ORDER — ATORVASTATIN CALCIUM 80 MG/1
20 TABLET, FILM COATED ORAL AT BEDTIME
Qty: 0 | Refills: 0 | Status: DISCONTINUED | OUTPATIENT
Start: 2017-11-09 | End: 2017-11-17

## 2017-11-09 RX ORDER — HALOPERIDOL DECANOATE 100 MG/ML
5 INJECTION INTRAMUSCULAR ONCE
Qty: 0 | Refills: 0 | Status: DISCONTINUED | OUTPATIENT
Start: 2017-11-09 | End: 2017-11-17

## 2017-11-09 RX ORDER — RISPERIDONE 4 MG/1
1 TABLET ORAL AT BEDTIME
Qty: 0 | Refills: 0 | Status: DISCONTINUED | OUTPATIENT
Start: 2017-11-09 | End: 2017-11-10

## 2017-11-09 RX ADMIN — Medication 2 MILLIGRAM(S): at 19:50

## 2017-11-09 RX ADMIN — SODIUM CHLORIDE 1000 MILLILITER(S): 9 INJECTION INTRAMUSCULAR; INTRAVENOUS; SUBCUTANEOUS at 00:01

## 2017-11-09 RX ADMIN — HALOPERIDOL DECANOATE 5 MILLIGRAM(S): 100 INJECTION INTRAMUSCULAR at 19:50

## 2017-11-09 RX ADMIN — SODIUM CHLORIDE 1000 MILLILITER(S): 9 INJECTION INTRAMUSCULAR; INTRAVENOUS; SUBCUTANEOUS at 03:56

## 2017-11-09 RX ADMIN — RISPERIDONE 1 MILLIGRAM(S): 4 TABLET ORAL at 21:27

## 2017-11-09 RX ADMIN — ATORVASTATIN CALCIUM 20 MILLIGRAM(S): 80 TABLET, FILM COATED ORAL at 21:27

## 2017-11-09 RX ADMIN — Medication 50 MILLIGRAM(S): at 19:50

## 2017-11-09 RX ADMIN — Medication 2.5 MILLIGRAM(S): at 04:33

## 2017-11-09 NOTE — ED BEHAVIORAL HEALTH ASSESSMENT NOTE - OTHER PAST PSYCHIATRIC HISTORY (INCLUDE DETAILS REGARDING ONSET, COURSE OF ILLNESS, INPATIENT/OUTPATIENT TREATMENT)
history of bipolar disorder, last psychiatric admission at [facility] from 1/11/17 – 1/18/17

## 2017-11-09 NOTE — ED BEHAVIORAL HEALTH ASSESSMENT NOTE - DESCRIPTION
UTI and received Bactrim in the ED, RPO=113 and downtrending and receiving IVF lives with her sister and nieces/nephews, from [place], [place]92 [place]

## 2017-11-09 NOTE — ED BEHAVIORAL HEALTH ASSESSMENT NOTE - HPI (INCLUDE ILLNESS QUALITY, SEVERITY, DURATION, TIMING, CONTEXT, MODIFYING FACTORS, ASSOCIATED SIGNS AND SYMPTOMS)
50 yo F with a history of bipolar disorder, last psychiatric admission at [facility] last Jan '17 presents accompanied by her sister with complaints of agitated behaviors at home and not eating for several days; in the context of nonadherence with medications. Pt is superficially cooperative on exam. Pt reports that she had no appetite as for the reason for not eating, but otherwise denies any other complaints. Pt is guarded, cites that what happened at her is her business with her family and declined to further elaborate on the reported agitation and yelling at home. Pt has been calm in the ED, but minimally cooperative. Pt admits to not taking her medications, Risperdal for several days because she feels like she doesn't want to depend on them. Pt also admits that she's been hearing voices and having auditory hallucinations, again to which she declines to specify what they tell her since it is her business. Pt also reports having poor sleep this past week.     Prior records reviewed which notes that she presented with yelling at home and was brought in by her sister. Her vitals were initially noted for tachycardia which has since subsided and was thought to be due from her agitation. Pt was minimally cooperative and refused to answer questions to medical staff as well. Her sister reported that the pt can become aggressive and agitated. Prior records note that she's broken things before, as well as having significant paranoia about her family that she lives with. Pt is known to be nonadherent with treatment as well.

## 2017-11-09 NOTE — ED BEHAVIORAL HEALTH NOTE - BEHAVIORAL HEALTH NOTE
NAME:   Yola Lobato   MR: [id number]      TELEPSYCHIATRY ENCOUNTER  **********************************************	  I have visualized that the patient is on an arms-length 1:1  I have visualized that patient is in a private space  I have confirmed with the patient that they understand and agree to the evaluation being performed via Telepsychiatry  I have discussed the above with Telepsychiatry Attending Dr Bueno  ************************************************  Behavioral Telehealth Care Manager COLLATERAL NOTE: Aurelia Gamez [facility]  ************************************************  *CHART REVIEW  ~Sunrise~ reviewed   ~Alpha~ N/A  ~CVM~ N/A   ~TIER~ N/A  ~Meditech/Quick Docs~N/A  ~Psyckes~ N/A  ~HIE/Healtix~ Reviewed  ~Sorian/Scan ER~N/A  ~Other~      ************************************************  COLLATERAL CONTACT:   Brooklyn Lobato  *************************************************  *NUMBER:    *RELATIONSHIP: sister  *RELIABILITY: Reliable  *OPINION RE PATIENT RELIABILITY: N/A   *OPINION RE CONCERN FOR DANGEROUSNESS:   N/A collateral unable to be reached, voicemails left, no access to guns or weapons in the home.  *PSYCHOEDUCATION: Reviewed role of Emergency Department, nature of voluntary vs involuntary hospitalizations, support groups for caregivers.  **********************************************	  CORE HISTORY PROVIDED BY:   Chart review  **********************************************  *DEMOGRAPHICS: Patient is a 51 year old female, domiciled in private home with her children, sister and niece, unemployed, dependent status, with a previous diagnosis of Bipolar Disorder, with prior hospitalizations, 12/28/16 at [facility] for laurent, psychosis, 1/11/17 for same behaviors.  *DEPENDENTS:  Patient has 3 children who are in the home.  *ED COURSE: Patient calm but refusing to speak to ED, no restraints or PRNS utilized.  *PAST PSYCH:  Patient hospitalized at [facility] 12/28/16, [place] 1/11/17 for aggressive behavior in the home and none compliance with medications.   *SUICIDALITY: No history of SI or attempts  *HOMICIDALITY/VIOLENCE:  Patient has history of aggression, throws things in the home when agitated, No access to weapons.   * SUBSTANCE:   Patient has no history of substance use.  * ARREST:  No history of arrests.  *FAMILY HISTORY:    None known  *SOCIAL HISTORY:  Patient has no history of trauma per chart reviewed, supportive stable housing and family.  Patient has history of non compliance with medications and aggression in the home when off medications.  * MEDICAL:  high cholesterol  * MEDICATIONS:   Risperdal 4mg at bedtime, Atorvastatin 20mg at bed time,   *DISPOSITION:  admit  ***********************************************   I have discussed the above information with Telepsychiatry Attending: Dr. Bueno  ************************************************

## 2017-11-09 NOTE — ED BEHAVIORAL HEALTH ASSESSMENT NOTE - RISK ASSESSMENT
She is at high risk of acute and imminent danger to herself; requires a more restrictive setting of care.

## 2017-11-10 LAB
CHOLEST SERPL-MCNC: 222 MG/DL — HIGH (ref 120–199)
HBA1C BLD-MCNC: 6.3 % — HIGH (ref 4–5.6)
HDLC SERPL-MCNC: 33 MG/DL — LOW (ref 45–65)
LIPID PNL WITH DIRECT LDL SERPL: 164 MG/DL — SIGNIFICANT CHANGE UP
TRIGL SERPL-MCNC: 81 MG/DL — SIGNIFICANT CHANGE UP (ref 10–149)

## 2017-11-10 PROCEDURE — 99232 SBSQ HOSP IP/OBS MODERATE 35: CPT

## 2017-11-10 RX ORDER — RISPERIDONE 4 MG/1
2 TABLET ORAL AT BEDTIME
Qty: 0 | Refills: 0 | Status: DISCONTINUED | OUTPATIENT
Start: 2017-11-10 | End: 2017-11-12

## 2017-11-11 PROCEDURE — 99232 SBSQ HOSP IP/OBS MODERATE 35: CPT

## 2017-11-11 RX ORDER — TETRAHYDROZOLINE/POLYETHYL GLY 0.05 %-1 %
1 DROPS OPHTHALMIC (EYE) EVERY 6 HOURS
Qty: 0 | Refills: 0 | Status: DISCONTINUED | OUTPATIENT
Start: 2017-11-11 | End: 2017-11-17

## 2017-11-11 RX ADMIN — RISPERIDONE 2 MILLIGRAM(S): 4 TABLET ORAL at 22:15

## 2017-11-11 RX ADMIN — Medication 1 DROP(S): at 14:05

## 2017-11-11 RX ADMIN — ATORVASTATIN CALCIUM 20 MILLIGRAM(S): 80 TABLET, FILM COATED ORAL at 22:15

## 2017-11-12 PROCEDURE — 99231 SBSQ HOSP IP/OBS SF/LOW 25: CPT

## 2017-11-12 RX ORDER — IBUPROFEN 200 MG
600 TABLET ORAL ONCE
Qty: 0 | Refills: 0 | Status: COMPLETED | OUTPATIENT
Start: 2017-11-12 | End: 2017-11-12

## 2017-11-12 RX ORDER — RISPERIDONE 4 MG/1
3 TABLET ORAL AT BEDTIME
Qty: 0 | Refills: 0 | Status: DISCONTINUED | OUTPATIENT
Start: 2017-11-12 | End: 2017-11-13

## 2017-11-12 RX ADMIN — ATORVASTATIN CALCIUM 20 MILLIGRAM(S): 80 TABLET, FILM COATED ORAL at 21:38

## 2017-11-12 RX ADMIN — Medication 600 MILLIGRAM(S): at 18:38

## 2017-11-12 RX ADMIN — RISPERIDONE 3 MILLIGRAM(S): 4 TABLET ORAL at 21:38

## 2017-11-12 RX ADMIN — Medication 600 MILLIGRAM(S): at 17:15

## 2017-11-13 PROCEDURE — 99232 SBSQ HOSP IP/OBS MODERATE 35: CPT

## 2017-11-13 RX ORDER — RISPERIDONE 4 MG/1
4 TABLET ORAL AT BEDTIME
Qty: 0 | Refills: 0 | Status: DISCONTINUED | OUTPATIENT
Start: 2017-11-13 | End: 2017-11-15

## 2017-11-13 RX ORDER — ACETAMINOPHEN 500 MG
650 TABLET ORAL EVERY 6 HOURS
Qty: 0 | Refills: 0 | Status: DISCONTINUED | OUTPATIENT
Start: 2017-11-13 | End: 2017-11-17

## 2017-11-13 RX ADMIN — RISPERIDONE 4 MILLIGRAM(S): 4 TABLET ORAL at 21:54

## 2017-11-13 RX ADMIN — Medication 650 MILLIGRAM(S): at 15:42

## 2017-11-13 RX ADMIN — Medication 650 MILLIGRAM(S): at 16:30

## 2017-11-14 PROCEDURE — 99232 SBSQ HOSP IP/OBS MODERATE 35: CPT

## 2017-11-14 RX ADMIN — ATORVASTATIN CALCIUM 20 MILLIGRAM(S): 80 TABLET, FILM COATED ORAL at 21:22

## 2017-11-14 RX ADMIN — RISPERIDONE 4 MILLIGRAM(S): 4 TABLET ORAL at 21:22

## 2017-11-15 PROCEDURE — 99232 SBSQ HOSP IP/OBS MODERATE 35: CPT

## 2017-11-15 RX ORDER — RISPERIDONE 4 MG/1
3 TABLET ORAL AT BEDTIME
Qty: 0 | Refills: 0 | Status: DISCONTINUED | OUTPATIENT
Start: 2017-11-15 | End: 2017-11-16

## 2017-11-15 RX ORDER — PALIPERIDONE 1.5 MG/1
234 TABLET, EXTENDED RELEASE ORAL ONCE
Qty: 0 | Refills: 0 | Status: COMPLETED | OUTPATIENT
Start: 2017-11-15 | End: 2017-11-15

## 2017-11-15 RX ADMIN — ATORVASTATIN CALCIUM 20 MILLIGRAM(S): 80 TABLET, FILM COATED ORAL at 21:47

## 2017-11-15 RX ADMIN — PALIPERIDONE 234 MILLIGRAM(S): 1.5 TABLET, EXTENDED RELEASE ORAL at 18:00

## 2017-11-16 PROCEDURE — 99232 SBSQ HOSP IP/OBS MODERATE 35: CPT

## 2017-11-16 RX ORDER — RISPERIDONE 4 MG/1
1 TABLET ORAL AT BEDTIME
Qty: 0 | Refills: 0 | Status: DISCONTINUED | OUTPATIENT
Start: 2017-11-16 | End: 2017-11-17

## 2017-11-16 RX ORDER — ATORVASTATIN CALCIUM 80 MG/1
1 TABLET, FILM COATED ORAL
Qty: 0 | Refills: 0 | DISCHARGE
Start: 2017-11-16

## 2017-11-16 RX ADMIN — Medication 650 MILLIGRAM(S): at 20:04

## 2017-11-16 RX ADMIN — Medication 650 MILLIGRAM(S): at 20:34

## 2017-11-16 RX ADMIN — ATORVASTATIN CALCIUM 20 MILLIGRAM(S): 80 TABLET, FILM COATED ORAL at 21:39

## 2017-11-16 RX ADMIN — RISPERIDONE 1 MILLIGRAM(S): 4 TABLET ORAL at 21:39

## 2017-11-17 VITALS — TEMPERATURE: 97 F

## 2017-11-17 PROCEDURE — 99238 HOSP IP/OBS DSCHRG MGMT 30/<: CPT

## 2017-11-17 RX ORDER — ATORVASTATIN CALCIUM 80 MG/1
1 TABLET, FILM COATED ORAL
Qty: 0 | Refills: 0 | COMMUNITY

## 2017-11-17 RX ORDER — RISPERIDONE 4 MG/1
1 TABLET ORAL
Qty: 0 | Refills: 0 | COMMUNITY

## 2019-04-02 ENCOUNTER — EMERGENCY (EMERGENCY)
Facility: HOSPITAL | Age: 54
LOS: 0 days | Discharge: HOME | End: 2019-04-02
Payer: COMMERCIAL

## 2019-04-02 VITALS
OXYGEN SATURATION: 98 % | WEIGHT: 179.9 LBS | SYSTOLIC BLOOD PRESSURE: 117 MMHG | TEMPERATURE: 98 F | HEIGHT: 63 IN | HEART RATE: 86 BPM | DIASTOLIC BLOOD PRESSURE: 75 MMHG | RESPIRATION RATE: 18 BRPM

## 2019-04-02 DIAGNOSIS — Z79.1 LONG TERM (CURRENT) USE OF NON-STEROIDAL ANTI-INFLAMMATORIES (NSAID): ICD-10-CM

## 2019-04-02 DIAGNOSIS — F31.9 BIPOLAR DISORDER, UNSPECIFIED: ICD-10-CM
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
## 2019-04-02 DIAGNOSIS — M54.5 LOW BACK PAIN: ICD-10-CM

## 2019-04-02 DIAGNOSIS — Z98.890 OTHER SPECIFIED POSTPROCEDURAL STATES: Chronic | ICD-10-CM
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
## 2019-04-02 PROCEDURE — 99283 EMERGENCY DEPT VISIT LOW MDM: CPT

## 2019-04-02 RX ORDER — PALIPERIDONE 1.5 MG/1
234 TABLET, EXTENDED RELEASE ORAL
Qty: 0 | Refills: 0 | COMMUNITY

## 2019-04-02 RX ORDER — KETOROLAC TROMETHAMINE 30 MG/ML
60 SYRINGE (ML) INJECTION ONCE
Qty: 0 | Refills: 0 | Status: DISCONTINUED | OUTPATIENT
Start: 2019-04-02 | End: 2019-04-02

## 2019-04-02 RX ORDER — CYCLOBENZAPRINE HYDROCHLORIDE 10 MG/1
1 TABLET, FILM COATED ORAL
Qty: 10 | Refills: 0
Start: 2019-04-02 | End: 2019-04-06

## 2019-04-02 RX ORDER — CYCLOBENZAPRINE HYDROCHLORIDE 10 MG/1
5 TABLET, FILM COATED ORAL ONCE
Qty: 0 | Refills: 0 | Status: COMPLETED | OUTPATIENT
Start: 2019-04-02 | End: 2019-04-02

## 2019-04-02 RX ORDER — IBUPROFEN 200 MG
1 TABLET ORAL
Qty: 28 | Refills: 0
Start: 2019-04-02 | End: 2019-04-08

## 2019-04-02 RX ADMIN — Medication 60 MILLIGRAM(S): at 16:50

## 2019-04-02 RX ADMIN — Medication 60 MILLIGRAM(S): at 17:38

## 2019-04-02 RX ADMIN — CYCLOBENZAPRINE HYDROCHLORIDE 5 MILLIGRAM(S): 10 TABLET, FILM COATED ORAL at 16:50

## 2019-04-02 NOTE — ED PROVIDER NOTE - CARE PROVIDER_API CALL
Fady Ibanez (DO)  Orthopaedic Surgery Orthopaedics Surgery  [address] [facility], [place] [address]  Phone: [phone]  Fax: [phone]  Follow Up Time:

## 2019-04-02 NOTE — ED PROVIDER NOTE - OBJECTIVE STATEMENT
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 Denies nausea,  vomiting, fever, sob, chest pain, flank pain, dysuria, hematuria, trauma,  incontinence, rash, neuro deficit, si, hi, hallcination

## 2019-04-02 NOTE — ED ADULT NURSE NOTE - NSIMPLEMENTINTERV_GEN_ALL_ED
Implemented All Universal Safety Interventions:  [place] to call system. Call bell, personal items and telephone within reach. Instruct patient to call for assistance. Room bathroom lighting operational. Non-slip footwear when patient is off stretcher. Physically safe environment: no spills, clutter or unnecessary equipment. Stretcher in lowest position, wheels locked, appropriate side rails in place.

## 2019-04-03 PROBLEM — Z00.00 ENCOUNTER FOR PREVENTIVE HEALTH EXAMINATION: Status: ACTIVE | Noted: 2019-04-03

## 2019-04-03 PROBLEM — F31.9 BIPOLAR DISORDER, UNSPECIFIED: Chronic | Status: ACTIVE | Noted: 2017-01-11

## 2019-04-15 ENCOUNTER — APPOINTMENT (OUTPATIENT)
Dept: OPHTHALMOLOGY | Facility: CLINIC | Age: 54
End: 2019-04-15

## 2019-04-16 ENCOUNTER — APPOINTMENT (OUTPATIENT)
Dept: OPHTHALMOLOGY | Facility: CLINIC | Age: 54
End: 2019-04-16

## 2019-05-20 ENCOUNTER — NON-APPOINTMENT (OUTPATIENT)
Age: 54
End: 2019-05-20

## 2019-05-20 ENCOUNTER — APPOINTMENT (OUTPATIENT)
Dept: OPHTHALMOLOGY | Facility: CLINIC | Age: 54
End: 2019-05-20
Payer: MEDICAID

## 2019-05-20 PROCEDURE — 92004 COMPRE OPH EXAM NEW PT 1/>: CPT

## 2019-05-20 PROCEDURE — 92015 DETERMINE REFRACTIVE STATE: CPT

## 2020-01-01 NOTE — ED PROVIDER NOTE - CROS ED SKIN ALL NEG
Problem: Patient Care Overview  Goal: Plan of Care Review  Outcome: Ongoing (interventions implemented as appropriate)  Flowsheets  Taken 1/1/2020 1452 by Heather Wiley RN  Progress: no change  Outcome Summary: Pt is A&Ox4. Medicated with PRN pain meds, intervention effective. Tachycardic. O2 sat fluctuating between 86-90%.  alarms frequently due to sats dropping. Respiratory therapy consulted. Currently on 6L O2 via NC. Droplet precautions in place. CIWA=2. Baseline tremors. PT got pt up to chair. Resting well currently. Safety maintained. Will cont to monitor.  Taken 1/1/2020 1340 by Indira Le PTA  Plan of Care Reviewed With: patient      negative...

## 2020-02-01 ENCOUNTER — OUTPATIENT (OUTPATIENT)
Dept: OUTPATIENT SERVICES | Facility: HOSPITAL | Age: 55
LOS: 1 days | End: 2020-02-01
Payer: MEDICAID

## 2020-02-01 DIAGNOSIS — Z98.890 OTHER SPECIFIED POSTPROCEDURAL STATES: Chronic | ICD-10-CM

## 2020-02-13 ENCOUNTER — EMERGENCY (EMERGENCY)
Facility: HOSPITAL | Age: 55
LOS: 0 days | Discharge: HOME | End: 2020-02-13
Attending: EMERGENCY MEDICINE
Payer: MEDICAID

## 2020-02-13 VITALS
HEART RATE: 78 BPM | RESPIRATION RATE: 16 BRPM | HEIGHT: 63 IN | WEIGHT: 173.94 LBS | DIASTOLIC BLOOD PRESSURE: 76 MMHG | SYSTOLIC BLOOD PRESSURE: 140 MMHG | OXYGEN SATURATION: 99 % | TEMPERATURE: 99 F

## 2020-02-13 VITALS
OXYGEN SATURATION: 95 % | SYSTOLIC BLOOD PRESSURE: 131 MMHG | HEART RATE: 80 BPM | TEMPERATURE: 98 F | DIASTOLIC BLOOD PRESSURE: 62 MMHG | RESPIRATION RATE: 17 BRPM

## 2020-02-13 DIAGNOSIS — Z88.8 ALLERGY STATUS TO OTHER DRUGS, MEDICAMENTS AND BIOLOGICAL SUBSTANCES STATUS: ICD-10-CM
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
## 2020-02-13 DIAGNOSIS — R07.9 CHEST PAIN, UNSPECIFIED: ICD-10-CM
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
## 2020-02-13 DIAGNOSIS — Z98.890 OTHER SPECIFIED POSTPROCEDURAL STATES: Chronic | ICD-10-CM

## 2020-02-13 DIAGNOSIS — S20.219A CONTUSION OF UNSPECIFIED FRONT WALL OF THORAX, INITIAL ENCOUNTER: ICD-10-CM

## 2020-02-13 PROCEDURE — 71046 X-RAY EXAM CHEST 2 VIEWS: CPT | Mod: 26

## 2020-02-13 PROCEDURE — 99284 EMERGENCY DEPT VISIT MOD MDM: CPT

## 2020-02-13 RX ORDER — IBUPROFEN 200 MG
1 TABLET ORAL
Qty: 15 | Refills: 0
Start: 2020-02-13 | End: 2020-02-17

## 2020-02-13 RX ORDER — KETOROLAC TROMETHAMINE 30 MG/ML
60 SYRINGE (ML) INJECTION ONCE
Refills: 0 | Status: DISCONTINUED | OUTPATIENT
Start: 2020-02-13 | End: 2020-02-13

## 2020-02-13 RX ADMIN — Medication 60 MILLIGRAM(S): at 20:14

## 2020-02-13 NOTE — ED PROVIDER NOTE - PATIENT PORTAL LINK FT
You can access the FollowMyHealth Patient Portal offered by [facility] by registering at the following website: [email or web address]. By joining [employer]’s FollowMyHealth portal, you will also be able to view your health information using other applications (apps) compatible with our system.
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
## 2020-02-13 NOTE — ED PROVIDER NOTE - PHYSICAL EXAMINATION
Gen: Alert, Well appearing. NAD    Head: NC, AT, PERRL, normal lids/conjunctiva   ENT: Bilateral TM WNL, patent oropharynx without erythema/exudate, uvula midline  Neck: supple, no tenderness/meningismus  Pulm: Bilateral clear BS, normal resp effort  CV: RRR, no M/R/G, +dist pulses  , ++ SS chest wall tenderness to palpation with mild swelling. no crepitus. pulses intact.  Abd: soft, NT/ND, +BS, no guarding/rebound tenderness  Mskel:  no edema/erythema/cyanosis   Skin: no rash, no bruising  Neuro: AAOx3, no sensory/motor deficits, CN 2-12 intact

## 2020-02-18 NOTE — ED ADULT NURSE NOTE - CAS DISCH TRANSFER METHOD
no back pain, no gout, no musculoskeletal pain, no neck pain, and no weakness.
Ambulance with ALS capability

## 2020-02-25 ENCOUNTER — INPATIENT (INPATIENT)
Facility: HOSPITAL | Age: 55
LOS: 1 days | Discharge: ANOTHER HOSPITAL | End: 2020-02-27
Attending: PSYCHIATRY & NEUROLOGY | Admitting: PSYCHIATRY & NEUROLOGY
Payer: MEDICAID

## 2020-02-25 VITALS
HEART RATE: 98 BPM | TEMPERATURE: 98 F | SYSTOLIC BLOOD PRESSURE: 132 MMHG | DIASTOLIC BLOOD PRESSURE: 70 MMHG | RESPIRATION RATE: 17 BRPM | OXYGEN SATURATION: 100 %

## 2020-02-25 DIAGNOSIS — Z98.890 OTHER SPECIFIED POSTPROCEDURAL STATES: Chronic | ICD-10-CM

## 2020-02-25 DIAGNOSIS — F31.9 BIPOLAR DISORDER, UNSPECIFIED: ICD-10-CM

## 2020-02-25 LAB
ALBUMIN SERPL ELPH-MCNC: 4.4 G/DL — SIGNIFICANT CHANGE UP (ref 3.3–5)
ALP SERPL-CCNC: 58 U/L — SIGNIFICANT CHANGE UP (ref 40–120)
ALT FLD-CCNC: 45 U/L — HIGH (ref 4–33)
ANION GAP SERPL CALC-SCNC: 19 MMO/L — HIGH (ref 7–14)
APAP SERPL-MCNC: < 15 UG/ML — LOW (ref 15–25)
AST SERPL-CCNC: 68 U/L — HIGH (ref 4–32)
BASOPHILS # BLD AUTO: 0.03 K/UL — SIGNIFICANT CHANGE UP (ref 0–0.2)
BASOPHILS NFR BLD AUTO: 0.4 % — SIGNIFICANT CHANGE UP (ref 0–2)
BILIRUB SERPL-MCNC: 0.4 MG/DL — SIGNIFICANT CHANGE UP (ref 0.2–1.2)
BUN SERPL-MCNC: 14 MG/DL — SIGNIFICANT CHANGE UP (ref 7–23)
CALCIUM SERPL-MCNC: 9.7 MG/DL — SIGNIFICANT CHANGE UP (ref 8.4–10.5)
CHLORIDE SERPL-SCNC: 98 MMOL/L — SIGNIFICANT CHANGE UP (ref 98–107)
CO2 SERPL-SCNC: 21 MMOL/L — LOW (ref 22–31)
CREAT SERPL-MCNC: 0.81 MG/DL — SIGNIFICANT CHANGE UP (ref 0.5–1.3)
EOSINOPHIL # BLD AUTO: 0.13 K/UL — SIGNIFICANT CHANGE UP (ref 0–0.5)
EOSINOPHIL NFR BLD AUTO: 1.8 % — SIGNIFICANT CHANGE UP (ref 0–6)
ETHANOL BLD-MCNC: < 10 MG/DL — SIGNIFICANT CHANGE UP
GLUCOSE SERPL-MCNC: 121 MG/DL — HIGH (ref 70–99)
HCT VFR BLD CALC: 41 % — SIGNIFICANT CHANGE UP (ref 34.5–45)
HGB BLD-MCNC: 13.2 G/DL — SIGNIFICANT CHANGE UP (ref 11.5–15.5)
IMM GRANULOCYTES NFR BLD AUTO: 0.1 % — SIGNIFICANT CHANGE UP (ref 0–1.5)
LYMPHOCYTES # BLD AUTO: 3.1 K/UL — SIGNIFICANT CHANGE UP (ref 1–3.3)
LYMPHOCYTES # BLD AUTO: 42.3 % — SIGNIFICANT CHANGE UP (ref 13–44)
MCHC RBC-ENTMCNC: 28 PG — SIGNIFICANT CHANGE UP (ref 27–34)
MCHC RBC-ENTMCNC: 32.2 % — SIGNIFICANT CHANGE UP (ref 32–36)
MCV RBC AUTO: 86.9 FL — SIGNIFICANT CHANGE UP (ref 80–100)
MONOCYTES # BLD AUTO: 0.71 K/UL — SIGNIFICANT CHANGE UP (ref 0–0.9)
MONOCYTES NFR BLD AUTO: 9.7 % — SIGNIFICANT CHANGE UP (ref 2–14)
NEUTROPHILS # BLD AUTO: 3.34 K/UL — SIGNIFICANT CHANGE UP (ref 1.8–7.4)
NEUTROPHILS NFR BLD AUTO: 45.7 % — SIGNIFICANT CHANGE UP (ref 43–77)
NRBC # FLD: 0 K/UL — SIGNIFICANT CHANGE UP (ref 0–0)
PLATELET # BLD AUTO: 327 K/UL — SIGNIFICANT CHANGE UP (ref 150–400)
PMV BLD: 11.6 FL — SIGNIFICANT CHANGE UP (ref 7–13)
POTASSIUM SERPL-MCNC: 3.5 MMOL/L — SIGNIFICANT CHANGE UP (ref 3.5–5.3)
POTASSIUM SERPL-SCNC: 3.5 MMOL/L — SIGNIFICANT CHANGE UP (ref 3.5–5.3)
PROT SERPL-MCNC: 7.9 G/DL — SIGNIFICANT CHANGE UP (ref 6–8.3)
RBC # BLD: 4.72 M/UL — SIGNIFICANT CHANGE UP (ref 3.8–5.2)
RBC # FLD: 14 % — SIGNIFICANT CHANGE UP (ref 10.3–14.5)
SALICYLATES SERPL-MCNC: < 5 MG/DL — LOW (ref 15–30)
SODIUM SERPL-SCNC: 138 MMOL/L — SIGNIFICANT CHANGE UP (ref 135–145)
TSH SERPL-MCNC: 1.33 UIU/ML — SIGNIFICANT CHANGE UP (ref 0.27–4.2)
WBC # BLD: 7.32 K/UL — SIGNIFICANT CHANGE UP (ref 3.8–10.5)
WBC # FLD AUTO: 7.32 K/UL — SIGNIFICANT CHANGE UP (ref 3.8–10.5)

## 2020-02-25 PROCEDURE — 99285 EMERGENCY DEPT VISIT HI MDM: CPT | Mod: GC

## 2020-02-25 RX ORDER — HALOPERIDOL DECANOATE 100 MG/ML
5 INJECTION INTRAMUSCULAR ONCE
Refills: 0 | Status: COMPLETED | OUTPATIENT
Start: 2020-02-25 | End: 2020-02-25

## 2020-02-25 RX ADMIN — Medication 2 MILLIGRAM(S): at 18:40

## 2020-02-25 RX ADMIN — HALOPERIDOL DECANOATE 5 MILLIGRAM(S): 100 INJECTION INTRAMUSCULAR at 18:40

## 2020-02-25 NOTE — ED BEHAVIORAL HEALTH ASSESSMENT NOTE - MODIFICATIONS
I have examined and evaluated the patient with Dr ADRIANA Steen and performed hernandez elements of the History and Mental Status Examination.  I agree with her assessment and recommendations.

## 2020-02-25 NOTE — ED BEHAVIORAL HEALTH ASSESSMENT NOTE - PATIENT'S CHIEF COMPLAINT
Pt states he moved to the [place] area and is asking for your recommendations of a new dr in the area. Pt also asking for a refill on his metoprolol  Pharmacy verified.
"I threw a glass."

## 2020-02-25 NOTE — ED PROVIDER NOTE - CLINICAL SUMMARY MEDICAL DECISION MAKING FREE TEXT BOX
This is a 54 yr old F, pmh bipolar disorder with c/o bizarre behaviour. Pt sent in from home due to increased deterioration reported by sister and nephew. As per ems pt not acting herself, inappropriate out bursts, sudden onset laughing, screaming, yelling.   Screening basic labs- results wnl, psychiatric consult, medication ordered due to sudden onset of acting out and agitation. This is a 54 yr old F, pmh bipolar disorder with c/o bizarre behaviour. Pt sent in from home due to increased deterioration reported by sister and nephew. As per ems pt not acting herself, inappropriate out bursts, sudden onset laughing, screaming, yelling.   Screening basic labs- results wnl, psychiatric consult- inpatient tx, medication ordered due to sudden onset of acting out and agitation. This is a 54 yr old F, pmh bipolar disorder with c/o bizarre behaviour. Pt sent in from home due to increased deterioration reported by sister and nephew. As per ems pt not acting herself, inappropriate out bursts, sudden onset laughing, screaming, yelling.   Screening basic labs- results wnl, psychiatric consult- inpatient tx, medication ordered due to sudden onset of acting out and agitation. Fluid hydration encouraged.

## 2020-02-25 NOTE — ED BEHAVIORAL HEALTH ASSESSMENT NOTE - DESCRIPTION
Patient was agitated on initial arrival to ED, received IM Haldol 5mg/Ativan 2mg x 1 dose each with good effect. 

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
## 2020-02-25 NOTE — ED ADULT NURSE NOTE - OBJECTIVE STATEMENT
Break Coverage RN: Received pt in , pt ambulatory, respirations even and unlabored b/l. Pt brought in by EMS with [employer] in handcuffs from home for agitation, aggressive behavior, bizarre behavior. Pt not answering questions. ERIBERTO Delcid Break Coverage RN: Received pt in , pt ambulatory, pacing, respirations even and unlabored b/l. Pt brought in by EMS with [employer] in handcuffs from home for agitation, aggressive behavior, bizarre behavior. Pt not answering questions. PMHx bipolar disorder, depression. Per EMS, pt not compliant with meds for 1 year. ERIBERTO Delcid at bedside for eval. Awaiting orders. Will continue to monitor.
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
## 2020-02-25 NOTE — ED BEHAVIORAL HEALTH ASSESSMENT NOTE - RISK ASSESSMENT
Patient is currently at low acute risk of suicide as she currently denies SI/I/P. Risk factors include underlying mood disorder, poor insight into illness, treatment noncompliance. Protective factors include strong social support, residential and employment stability, no known prior SA/SI. Low Acute Suicide Risk

## 2020-02-25 NOTE — ED ADULT TRIAGE NOTE - CHIEF COMPLAINT QUOTE
Pt brought in by EMS/ [employer] in handcuffs after someone from home called 911 for pt acting aggressive in home. As per EMS pt has disorganized thoughts and erratic behavior. PMH depression/ Bi Polar non compliant with psych meds. Pt denies medical complaints.

## 2020-02-25 NOTE — ED PROVIDER NOTE - OBJECTIVE STATEMENT
This is a 54 yr old F, pmh bipolar disorder with c/o bizarre behaviour. Pt sent in from home due to increased deterioration reported by sister and nephew. As per ems pt not acting herself, inappropriate out bursts, sudden onset laughing, screaming, yelling. Pt nephew reported pt's aggression towards him.   Upon arrival she is sad, laughing and crying in the same time. She express she wants to go home, cooperative with care.
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
## 2020-02-25 NOTE — ED BEHAVIORAL HEALTH ASSESSMENT NOTE - PSYCHIATRIC ISSUES AND PLAN (INCLUDE STANDING AND PRN MEDICATION)
Patient with history of bipolar disorder, reportedly with response to risperidone in the past. Would recommend starting standing risperidone 1mg. PRN Haldol 5/Ativan 2/Benadryl 50mg IM/PO q6hrs for agitation. Patient with history of bipolar disorder, reportedly with response to risperidone/invega sustenna in the past. Would recommend starting standing risperidone 1mg. PRN Haldol 5/Ativan 2/Benadryl 50mg IM/PO q6hrs for agitation.

## 2020-02-25 NOTE — ED BEHAVIORAL HEALTH ASSESSMENT NOTE - MEDICAL ISSUES AND PLAN (INCLUDE STANDING AND PRN MEDICATION)
Patient reports history of pre-diabetes, denies taking any home medication for glycemic control. Will order A1C and lipid panel for AM.

## 2020-02-26 DIAGNOSIS — F31.9 BIPOLAR DISORDER, UNSPECIFIED: ICD-10-CM

## 2020-02-26 LAB
AMPHET UR-MCNC: NEGATIVE — SIGNIFICANT CHANGE UP
APPEARANCE UR: CLEAR — SIGNIFICANT CHANGE UP
BACTERIA # UR AUTO: SIGNIFICANT CHANGE UP
BARBITURATES UR SCN-MCNC: NEGATIVE — SIGNIFICANT CHANGE UP
BENZODIAZ UR-MCNC: NEGATIVE — SIGNIFICANT CHANGE UP
BILIRUB UR-MCNC: NEGATIVE — SIGNIFICANT CHANGE UP
BLOOD UR QL VISUAL: NEGATIVE — SIGNIFICANT CHANGE UP
CANNABINOIDS UR-MCNC: NEGATIVE — SIGNIFICANT CHANGE UP
CHOLEST SERPL-MCNC: 166 MG/DL — SIGNIFICANT CHANGE UP (ref 120–199)
COCAINE METAB.OTHER UR-MCNC: NEGATIVE — SIGNIFICANT CHANGE UP
COLOR SPEC: YELLOW — SIGNIFICANT CHANGE UP
GLUCOSE UR-MCNC: NEGATIVE — SIGNIFICANT CHANGE UP
HBA1C BLD-MCNC: 6.1 % — HIGH (ref 4–5.6)
HDLC SERPL-MCNC: 32 MG/DL — LOW (ref 45–65)
HYALINE CASTS # UR AUTO: NEGATIVE — SIGNIFICANT CHANGE UP
KETONES UR-MCNC: SIGNIFICANT CHANGE UP
LEUKOCYTE ESTERASE UR-ACNC: NEGATIVE — SIGNIFICANT CHANGE UP
LIPID PNL WITH DIRECT LDL SERPL: 113 MG/DL — SIGNIFICANT CHANGE UP
METHADONE UR-MCNC: NEGATIVE — SIGNIFICANT CHANGE UP
NITRITE UR-MCNC: NEGATIVE — SIGNIFICANT CHANGE UP
OPIATES UR-MCNC: NEGATIVE — SIGNIFICANT CHANGE UP
OXYCODONE UR-MCNC: NEGATIVE — SIGNIFICANT CHANGE UP
PCP UR-MCNC: NEGATIVE — SIGNIFICANT CHANGE UP
PH UR: 6 — SIGNIFICANT CHANGE UP (ref 5–8)
PROT UR-MCNC: 20 — SIGNIFICANT CHANGE UP
RBC CASTS # UR COMP ASSIST: SIGNIFICANT CHANGE UP (ref 0–?)
SP GR SPEC: 1.02 — SIGNIFICANT CHANGE UP (ref 1–1.04)
SQUAMOUS # UR AUTO: SIGNIFICANT CHANGE UP
TRIGL SERPL-MCNC: 96 MG/DL — SIGNIFICANT CHANGE UP (ref 10–149)
UROBILINOGEN FLD QL: NORMAL — SIGNIFICANT CHANGE UP
WBC UR QL: SIGNIFICANT CHANGE UP (ref 0–?)

## 2020-02-26 PROCEDURE — 99222 1ST HOSP IP/OBS MODERATE 55: CPT

## 2020-02-26 RX ORDER — DIPHENHYDRAMINE HCL 50 MG
50 CAPSULE ORAL ONCE
Refills: 0 | Status: DISCONTINUED | OUTPATIENT
Start: 2020-02-26 | End: 2020-02-27

## 2020-02-26 RX ORDER — HALOPERIDOL DECANOATE 100 MG/ML
5 INJECTION INTRAMUSCULAR ONCE
Refills: 0 | Status: DISCONTINUED | OUTPATIENT
Start: 2020-02-26 | End: 2020-02-27

## 2020-02-26 RX ORDER — DIPHENHYDRAMINE HCL 50 MG
50 CAPSULE ORAL EVERY 6 HOURS
Refills: 0 | Status: DISCONTINUED | OUTPATIENT
Start: 2020-02-26 | End: 2020-02-27

## 2020-02-26 RX ORDER — RISPERIDONE 4 MG/1
1 TABLET ORAL AT BEDTIME
Refills: 0 | Status: DISCONTINUED | OUTPATIENT
Start: 2020-02-26 | End: 2020-02-27

## 2020-02-26 RX ORDER — DIVALPROEX SODIUM 500 MG/1
500 TABLET, DELAYED RELEASE ORAL AT BEDTIME
Refills: 0 | Status: DISCONTINUED | OUTPATIENT
Start: 2020-02-26 | End: 2020-02-27

## 2020-02-26 RX ORDER — HALOPERIDOL DECANOATE 100 MG/ML
5 INJECTION INTRAMUSCULAR EVERY 6 HOURS
Refills: 0 | Status: DISCONTINUED | OUTPATIENT
Start: 2020-02-26 | End: 2020-02-27

## 2020-02-26 RX ADMIN — HALOPERIDOL DECANOATE 5 MILLIGRAM(S): 100 INJECTION INTRAMUSCULAR at 20:44

## 2020-02-26 RX ADMIN — Medication 50 MILLIGRAM(S): at 20:44

## 2020-02-26 RX ADMIN — Medication 50 MILLIGRAM(S): at 12:02

## 2020-02-26 RX ADMIN — Medication 2 MILLIGRAM(S): at 20:44

## 2020-02-26 RX ADMIN — HALOPERIDOL DECANOATE 5 MILLIGRAM(S): 100 INJECTION INTRAMUSCULAR at 12:02

## 2020-02-26 RX ADMIN — Medication 2 MILLIGRAM(S): at 12:02

## 2020-02-26 RX ADMIN — DIVALPROEX SODIUM 500 MILLIGRAM(S): 500 TABLET, DELAYED RELEASE ORAL at 20:44

## 2020-02-26 RX ADMIN — RISPERIDONE 1 MILLIGRAM(S): 4 TABLET ORAL at 20:44

## 2020-02-27 ENCOUNTER — TRANSCRIPTION ENCOUNTER (OUTPATIENT)
Age: 55
End: 2020-02-27

## 2020-02-27 ENCOUNTER — INPATIENT (INPATIENT)
Facility: HOSPITAL | Age: 55
LOS: 0 days | Discharge: HOME | DRG: 312 | End: 2020-02-27
Attending: STUDENT IN AN ORGANIZED HEALTH CARE EDUCATION/TRAINING PROGRAM | Admitting: STUDENT IN AN ORGANIZED HEALTH CARE EDUCATION/TRAINING PROGRAM
Payer: MEDICAID

## 2020-02-27 ENCOUNTER — INPATIENT (INPATIENT)
Facility: HOSPITAL | Age: 55
LOS: 6 days | Discharge: ANOTHER HOSPITAL | End: 2020-03-05
Attending: PSYCHIATRY & NEUROLOGY | Admitting: PSYCHIATRY & NEUROLOGY
Payer: MEDICAID

## 2020-02-27 VITALS — OXYGEN SATURATION: 100 % | RESPIRATION RATE: 18 BRPM | WEIGHT: 140.21 LBS | TEMPERATURE: 99 F | HEIGHT: 63 IN

## 2020-02-27 VITALS
SYSTOLIC BLOOD PRESSURE: 89 MMHG | RESPIRATION RATE: 17 BRPM | DIASTOLIC BLOOD PRESSURE: 42 MMHG | HEART RATE: 72 BPM | OXYGEN SATURATION: 100 %

## 2020-02-27 VITALS
RESPIRATION RATE: 20 BRPM | DIASTOLIC BLOOD PRESSURE: 75 MMHG | OXYGEN SATURATION: 100 % | SYSTOLIC BLOOD PRESSURE: 123 MMHG | TEMPERATURE: 98 F | HEART RATE: 94 BPM

## 2020-02-27 VITALS
RESPIRATION RATE: 18 BRPM | HEART RATE: 79 BPM | DIASTOLIC BLOOD PRESSURE: 75 MMHG | OXYGEN SATURATION: 98 % | SYSTOLIC BLOOD PRESSURE: 97 MMHG

## 2020-02-27 DIAGNOSIS — Z29.9 ENCOUNTER FOR PROPHYLACTIC MEASURES, UNSPECIFIED: ICD-10-CM

## 2020-02-27 DIAGNOSIS — R55 SYNCOPE AND COLLAPSE: ICD-10-CM

## 2020-02-27 DIAGNOSIS — F31.9 BIPOLAR DISORDER, UNSPECIFIED: ICD-10-CM

## 2020-02-27 DIAGNOSIS — Z98.890 OTHER SPECIFIED POSTPROCEDURAL STATES: Chronic | ICD-10-CM

## 2020-02-27 DIAGNOSIS — Z02.9 ENCOUNTER FOR ADMINISTRATIVE EXAMINATIONS, UNSPECIFIED: ICD-10-CM

## 2020-02-27 DIAGNOSIS — F31.64 BIPOLAR DISORDER, CURRENT EPISODE MIXED, SEVERE, WITH PSYCHOTIC FEATURES: ICD-10-CM
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
## 2020-02-27 DIAGNOSIS — F32.9 MAJOR DEPRESSIVE DISORDER, SINGLE EPISODE, UNSPECIFIED: ICD-10-CM

## 2020-02-27 LAB
ALBUMIN SERPL ELPH-MCNC: 3.4 G/DL — SIGNIFICANT CHANGE UP (ref 3.3–5)
ALP SERPL-CCNC: 46 U/L — SIGNIFICANT CHANGE UP (ref 40–120)
ALT FLD-CCNC: 36 U/L — SIGNIFICANT CHANGE UP (ref 10–45)
AMPHET UR-MCNC: NEGATIVE — SIGNIFICANT CHANGE UP
ANION GAP SERPL CALC-SCNC: 15 MMOL/L — SIGNIFICANT CHANGE UP (ref 5–17)
APAP SERPL-MCNC: <15 UG/ML — SIGNIFICANT CHANGE UP (ref 10–30)
APPEARANCE UR: CLEAR — SIGNIFICANT CHANGE UP
AST SERPL-CCNC: 53 U/L — HIGH (ref 10–40)
BACTERIA # UR AUTO: NEGATIVE — SIGNIFICANT CHANGE UP
BARBITURATES UR SCN-MCNC: NEGATIVE — SIGNIFICANT CHANGE UP
BASE EXCESS BLDV CALC-SCNC: 3.7 MMOL/L — HIGH (ref -2–2)
BENZODIAZ UR-MCNC: NEGATIVE — SIGNIFICANT CHANGE UP
BILIRUB SERPL-MCNC: 0.3 MG/DL — SIGNIFICANT CHANGE UP (ref 0.2–1.2)
BILIRUB UR-MCNC: NEGATIVE — SIGNIFICANT CHANGE UP
BUN SERPL-MCNC: 11 MG/DL — SIGNIFICANT CHANGE UP (ref 7–23)
CA-I SERPL-SCNC: 1.23 MMOL/L — SIGNIFICANT CHANGE UP (ref 1.12–1.3)
CALCIUM SERPL-MCNC: 8.8 MG/DL — SIGNIFICANT CHANGE UP (ref 8.4–10.5)
CHLORIDE BLDV-SCNC: 103 MMOL/L — SIGNIFICANT CHANGE UP (ref 96–108)
CHLORIDE SERPL-SCNC: 103 MMOL/L — SIGNIFICANT CHANGE UP (ref 96–108)
CK MB BLD-MCNC: 0.4 % — SIGNIFICANT CHANGE UP (ref 0–3.5)
CK MB CFR SERPL CALC: 4.2 NG/ML — HIGH (ref 0–3.8)
CK SERPL-CCNC: 1180 U/L — HIGH (ref 25–170)
CO2 BLDV-SCNC: 33 MMOL/L — HIGH (ref 22–30)
CO2 SERPL-SCNC: 22 MMOL/L — SIGNIFICANT CHANGE UP (ref 22–31)
COCAINE METAB.OTHER UR-MCNC: NEGATIVE — SIGNIFICANT CHANGE UP
COLOR SPEC: SIGNIFICANT CHANGE UP
CREAT SERPL-MCNC: 0.86 MG/DL — SIGNIFICANT CHANGE UP (ref 0.5–1.3)
DIFF PNL FLD: ABNORMAL
EPI CELLS # UR: 2 /HPF — SIGNIFICANT CHANGE UP
GAS PNL BLDV: 138 MMOL/L — SIGNIFICANT CHANGE UP (ref 135–145)
GAS PNL BLDV: SIGNIFICANT CHANGE UP
GLUCOSE BLDV-MCNC: 110 MG/DL — HIGH (ref 70–99)
GLUCOSE SERPL-MCNC: 116 MG/DL — HIGH (ref 70–99)
GLUCOSE UR QL: NEGATIVE — SIGNIFICANT CHANGE UP
HCO3 BLDV-SCNC: 31 MMOL/L — HIGH (ref 21–29)
HCT VFR BLD CALC: 37.8 % — SIGNIFICANT CHANGE UP (ref 34.5–45)
HCT VFR BLDA CALC: 39 % — SIGNIFICANT CHANGE UP (ref 39–50)
HGB BLD CALC-MCNC: 12.8 G/DL — SIGNIFICANT CHANGE UP (ref 11.5–15.5)
HGB BLD-MCNC: 11.8 G/DL — SIGNIFICANT CHANGE UP (ref 11.5–15.5)
HYALINE CASTS # UR AUTO: 2 /LPF — SIGNIFICANT CHANGE UP (ref 0–2)
KETONES UR-MCNC: SIGNIFICANT CHANGE UP
LACTATE BLDV-MCNC: 2.6 MMOL/L — HIGH (ref 0.7–2)
LEUKOCYTE ESTERASE UR-ACNC: NEGATIVE — SIGNIFICANT CHANGE UP
LIDOCAIN IGE QN: 25 U/L — SIGNIFICANT CHANGE UP (ref 7–60)
MAGNESIUM SERPL-MCNC: 2.2 MG/DL — SIGNIFICANT CHANGE UP (ref 1.6–2.6)
MCHC RBC-ENTMCNC: 28.3 PG — SIGNIFICANT CHANGE UP (ref 27–34)
MCHC RBC-ENTMCNC: 31.2 GM/DL — LOW (ref 32–36)
MCV RBC AUTO: 90.6 FL — SIGNIFICANT CHANGE UP (ref 80–100)
METHADONE UR-MCNC: NEGATIVE — SIGNIFICANT CHANGE UP
NITRITE UR-MCNC: NEGATIVE — SIGNIFICANT CHANGE UP
NRBC # BLD: 0 /100 WBCS — SIGNIFICANT CHANGE UP (ref 0–0)
NT-PROBNP SERPL-SCNC: 58 PG/ML — SIGNIFICANT CHANGE UP (ref 0–300)
OPIATES UR-MCNC: NEGATIVE — SIGNIFICANT CHANGE UP
OTHER CELLS CSF MANUAL: 4 ML/DL — LOW (ref 18–22)
OXYCODONE UR-MCNC: NEGATIVE — SIGNIFICANT CHANGE UP
PCO2 BLDV: 61 MMHG — HIGH (ref 35–50)
PCP SPEC-MCNC: SIGNIFICANT CHANGE UP
PCP UR-MCNC: NEGATIVE — SIGNIFICANT CHANGE UP
PH BLDV: 7.32 — LOW (ref 7.35–7.45)
PH UR: 6 — SIGNIFICANT CHANGE UP (ref 5–8)
PLATELET # BLD AUTO: 216 K/UL — SIGNIFICANT CHANGE UP (ref 150–400)
PO2 BLDV: 22 MMHG — LOW (ref 25–45)
POTASSIUM BLDV-SCNC: 3.8 MMOL/L — SIGNIFICANT CHANGE UP (ref 3.5–5.3)
POTASSIUM SERPL-MCNC: 4.1 MMOL/L — SIGNIFICANT CHANGE UP (ref 3.5–5.3)
POTASSIUM SERPL-SCNC: 4.1 MMOL/L — SIGNIFICANT CHANGE UP (ref 3.5–5.3)
PROT SERPL-MCNC: 6.8 G/DL — SIGNIFICANT CHANGE UP (ref 6–8.3)
PROT UR-MCNC: NEGATIVE — SIGNIFICANT CHANGE UP
RBC # BLD: 4.17 M/UL — SIGNIFICANT CHANGE UP (ref 3.8–5.2)
RBC # FLD: 14.3 % — SIGNIFICANT CHANGE UP (ref 10.3–14.5)
RBC CASTS # UR COMP ASSIST: 2 /HPF — SIGNIFICANT CHANGE UP (ref 0–4)
SALICYLATES SERPL-MCNC: <2 MG/DL — LOW (ref 15–30)
SAO2 % BLDV: 22 % — LOW (ref 67–88)
SODIUM SERPL-SCNC: 140 MMOL/L — SIGNIFICANT CHANGE UP (ref 135–145)
SP GR SPEC: 1.01 — LOW (ref 1.01–1.02)
THC UR QL: NEGATIVE — SIGNIFICANT CHANGE UP
TROPONIN T, HIGH SENSITIVITY RESULT: <6 NG/L — SIGNIFICANT CHANGE UP (ref 0–51)
UROBILINOGEN FLD QL: NEGATIVE — SIGNIFICANT CHANGE UP
WBC # BLD: 7.59 K/UL — SIGNIFICANT CHANGE UP (ref 3.8–10.5)
WBC # FLD AUTO: 7.59 K/UL — SIGNIFICANT CHANGE UP (ref 3.8–10.5)
WBC UR QL: 2 /HPF — SIGNIFICANT CHANGE UP (ref 0–5)

## 2020-02-27 PROCEDURE — 99285 EMERGENCY DEPT VISIT HI MDM: CPT

## 2020-02-27 PROCEDURE — 99223 1ST HOSP IP/OBS HIGH 75: CPT | Mod: GC

## 2020-02-27 PROCEDURE — 82803 BLOOD GASES ANY COMBINATION: CPT

## 2020-02-27 PROCEDURE — 99291 CRITICAL CARE FIRST HOUR: CPT

## 2020-02-27 PROCEDURE — 82435 ASSAY OF BLOOD CHLORIDE: CPT

## 2020-02-27 PROCEDURE — 82550 ASSAY OF CK (CPK): CPT

## 2020-02-27 PROCEDURE — 82947 ASSAY GLUCOSE BLOOD QUANT: CPT

## 2020-02-27 PROCEDURE — 93306 TTE W/DOPPLER COMPLETE: CPT

## 2020-02-27 PROCEDURE — 85014 HEMATOCRIT: CPT

## 2020-02-27 PROCEDURE — 93308 TTE F-UP OR LMTD: CPT

## 2020-02-27 PROCEDURE — 82962 GLUCOSE BLOOD TEST: CPT

## 2020-02-27 PROCEDURE — 84295 ASSAY OF SERUM SODIUM: CPT

## 2020-02-27 PROCEDURE — 82330 ASSAY OF CALCIUM: CPT

## 2020-02-27 PROCEDURE — 84484 ASSAY OF TROPONIN QUANT: CPT

## 2020-02-27 PROCEDURE — 83735 ASSAY OF MAGNESIUM: CPT

## 2020-02-27 PROCEDURE — 93306 TTE W/DOPPLER COMPLETE: CPT | Mod: 26

## 2020-02-27 PROCEDURE — 83880 ASSAY OF NATRIURETIC PEPTIDE: CPT

## 2020-02-27 PROCEDURE — 84132 ASSAY OF SERUM POTASSIUM: CPT

## 2020-02-27 PROCEDURE — 99232 SBSQ HOSP IP/OBS MODERATE 35: CPT

## 2020-02-27 PROCEDURE — 71045 X-RAY EXAM CHEST 1 VIEW: CPT

## 2020-02-27 PROCEDURE — 72125 CT NECK SPINE W/O DYE: CPT | Mod: 26

## 2020-02-27 PROCEDURE — 70450 CT HEAD/BRAIN W/O DYE: CPT | Mod: 26

## 2020-02-27 PROCEDURE — 70450 CT HEAD/BRAIN W/O DYE: CPT

## 2020-02-27 PROCEDURE — 83605 ASSAY OF LACTIC ACID: CPT

## 2020-02-27 PROCEDURE — 71045 X-RAY EXAM CHEST 1 VIEW: CPT | Mod: 26

## 2020-02-27 PROCEDURE — 80053 COMPREHEN METABOLIC PANEL: CPT

## 2020-02-27 PROCEDURE — 85027 COMPLETE CBC AUTOMATED: CPT

## 2020-02-27 PROCEDURE — 83690 ASSAY OF LIPASE: CPT

## 2020-02-27 PROCEDURE — 93010 ELECTROCARDIOGRAM REPORT: CPT

## 2020-02-27 PROCEDURE — 82553 CREATINE MB FRACTION: CPT

## 2020-02-27 PROCEDURE — 99233 SBSQ HOSP IP/OBS HIGH 50: CPT

## 2020-02-27 PROCEDURE — 99233 SBSQ HOSP IP/OBS HIGH 50: CPT | Mod: GC

## 2020-02-27 PROCEDURE — 80307 DRUG TEST PRSMV CHEM ANLYZR: CPT

## 2020-02-27 PROCEDURE — 72125 CT NECK SPINE W/O DYE: CPT

## 2020-02-27 PROCEDURE — 93005 ELECTROCARDIOGRAM TRACING: CPT

## 2020-02-27 PROCEDURE — 81001 URINALYSIS AUTO W/SCOPE: CPT

## 2020-02-27 RX ORDER — SODIUM CHLORIDE 9 MG/ML
1000 INJECTION INTRAMUSCULAR; INTRAVENOUS; SUBCUTANEOUS ONCE
Refills: 0 | Status: COMPLETED | OUTPATIENT
Start: 2020-02-27 | End: 2020-02-27

## 2020-02-27 RX ORDER — SODIUM CHLORIDE 9 MG/ML
1000 INJECTION, SOLUTION INTRAVENOUS ONCE
Refills: 0 | Status: COMPLETED | OUTPATIENT
Start: 2020-02-27 | End: 2020-02-27

## 2020-02-27 RX ORDER — DIVALPROEX SODIUM 500 MG/1
500 TABLET, DELAYED RELEASE ORAL AT BEDTIME
Refills: 0 | Status: DISCONTINUED | OUTPATIENT
Start: 2020-02-27 | End: 2020-03-03

## 2020-02-27 RX ORDER — ASPIRIN/CALCIUM CARB/MAGNESIUM 324 MG
324 TABLET ORAL DAILY
Refills: 0 | Status: DISCONTINUED | OUTPATIENT
Start: 2020-02-27 | End: 2020-02-27

## 2020-02-27 RX ORDER — HALOPERIDOL DECANOATE 100 MG/ML
1 INJECTION INTRAMUSCULAR
Qty: 0 | Refills: 0 | DISCHARGE
Start: 2020-02-27

## 2020-02-27 RX ORDER — HALOPERIDOL DECANOATE 100 MG/ML
5 INJECTION INTRAMUSCULAR EVERY 6 HOURS
Refills: 0 | Status: DISCONTINUED | OUTPATIENT
Start: 2020-02-27 | End: 2020-03-05

## 2020-02-27 RX ORDER — RISPERIDONE 4 MG/1
1 TABLET ORAL
Refills: 0 | Status: DISCONTINUED | OUTPATIENT
Start: 2020-02-27 | End: 2020-02-27

## 2020-02-27 RX ORDER — HALOPERIDOL DECANOATE 100 MG/ML
5 INJECTION INTRAMUSCULAR ONCE
Refills: 0 | Status: DISCONTINUED | OUTPATIENT
Start: 2020-02-27 | End: 2020-03-05

## 2020-02-27 RX ORDER — DIPHENHYDRAMINE HCL 50 MG
50 CAPSULE ORAL ONCE
Refills: 0 | Status: DISCONTINUED | OUTPATIENT
Start: 2020-02-27 | End: 2020-03-05

## 2020-02-27 RX ORDER — RISPERIDONE 4 MG/1
1 TABLET ORAL
Qty: 0 | Refills: 0 | DISCHARGE
Start: 2020-02-27

## 2020-02-27 RX ORDER — DIVALPROEX SODIUM 500 MG/1
500 TABLET, DELAYED RELEASE ORAL AT BEDTIME
Refills: 0 | Status: DISCONTINUED | OUTPATIENT
Start: 2020-02-27 | End: 2020-02-27

## 2020-02-27 RX ORDER — RISPERIDONE 4 MG/1
1 TABLET ORAL AT BEDTIME
Refills: 0 | Status: DISCONTINUED | OUTPATIENT
Start: 2020-02-27 | End: 2020-02-27

## 2020-02-27 RX ORDER — HALOPERIDOL DECANOATE 100 MG/ML
2 INJECTION INTRAMUSCULAR EVERY 6 HOURS
Refills: 0 | Status: DISCONTINUED | OUTPATIENT
Start: 2020-02-27 | End: 2020-02-27

## 2020-02-27 RX ORDER — DIPHENHYDRAMINE HCL 50 MG
50 CAPSULE ORAL EVERY 6 HOURS
Refills: 0 | Status: DISCONTINUED | OUTPATIENT
Start: 2020-02-27 | End: 2020-03-05

## 2020-02-27 RX ORDER — RISPERIDONE 4 MG/1
1 TABLET ORAL AT BEDTIME
Refills: 0 | Status: DISCONTINUED | OUTPATIENT
Start: 2020-02-27 | End: 2020-02-28

## 2020-02-27 RX ADMIN — SODIUM CHLORIDE 1000 MILLILITER(S): 9 INJECTION, SOLUTION INTRAVENOUS at 18:13

## 2020-02-27 RX ADMIN — SODIUM CHLORIDE 1000 MILLILITER(S): 9 INJECTION, SOLUTION INTRAVENOUS at 06:19

## 2020-02-27 RX ADMIN — DIVALPROEX SODIUM 500 MILLIGRAM(S): 500 TABLET, DELAYED RELEASE ORAL at 23:31

## 2020-02-27 RX ADMIN — RISPERIDONE 1 MILLIGRAM(S): 4 TABLET ORAL at 18:12

## 2020-02-27 RX ADMIN — SODIUM CHLORIDE 1000 MILLILITER(S): 9 INJECTION INTRAMUSCULAR; INTRAVENOUS; SUBCUTANEOUS at 03:22

## 2020-02-27 NOTE — ED PROVIDER NOTE - MDM ORDERS SUBMITTED SELECTION
Principal Discharge DX:	Sepsis  Secondary Diagnosis:	UTI (urinary tract infection) Imaging Studies/Labs

## 2020-02-27 NOTE — ED ADULT NURSE NOTE - OBJECTIVE STATEMENT
Patient is a 54 year old female brought in from [facility] with s/p fall possible syncope. Arrived by EMS. Patient has history of bipolar. as per ems pt hypotensive, lethargic but easily rousable, pt fell in bedroom as per ems, as per ems pt is normally more awake and responsive. pt was medicated with routine bedtime  dose of haldol and benadryl as per ems. Patient is A&O x 3 but lethargic. Pt has hematoma to right side of head. pupils equal and reactive. pt denies numbness tingling, able to move all extremities. pt skin color is pale,  Denies complaints of chest pain, sob, fevers, chills, n/v/d, headache, syncope, burning urination, blood in urine, blood in stool. Abd is soft, non tender, non distended. [facility] staff  at the bedside. Will continue to monitor.

## 2020-02-27 NOTE — DISCHARGE NOTE PROVIDER - NSDCMRMEDTOKEN_GEN_ALL_CORE_FT
Ativan 2 mg oral tablet: 1 tab(s) orally every 6 hours, As Needed Agitation  diphenhydrAMINE 50 mg oral tablet: 1 tab(s) orally , every 6 hours, As Needed for agitation  diphenhydrAMINE 50 mg/mL injectable solution: 50 milligram(s) injectable every 6 hours, As Needed for agitation or EPS symtpoms  divalproex sodium 500 mg oral tablet, extended release: 1 tab(s) orally once a day (at bedtime)  Haldol 5 mg oral tablet: 1 tab(s) orally 4 times a day, As Needed for agitation  LORazepam 2 mg/mL injectable solution: injectable every 6 hours, As Needed for agitation  risperiDONE 1 mg oral tablet: 1 tab(s) orally once a day (at bedtime) Ativan 2 mg oral tablet: 1 tab(s) orally every 6 hours, As Needed Agitation  diphenhydrAMINE 50 mg oral tablet: 1 tab(s) orally , every 6 hours, As Needed for agitation  diphenhydrAMINE 50 mg/mL injectable solution: 50 milligram(s) injectable every 6 hours, As Needed for agitation or EPS symtpoms  divalproex sodium 500 mg oral tablet, extended release: 1 tab(s) orally once a day (at bedtime)  Haldol 5 mg oral tablet: 1 tab(s) orally 4 times a day, As Needed for agitation  LORazepam 2 mg/mL injectable solution: injectable every 6 hours, As Needed for agitation Ativan 2 mg oral tablet: 1 tab(s) orally every 6 hours, As Needed Agitation  diphenhydrAMINE 50 mg oral tablet: 1 tab(s) orally , every 6 hours, As Needed for agitation  diphenhydrAMINE 50 mg/mL injectable solution: 50 milligram(s) injectable every 6 hours, As Needed for agitation or EPS symtpoms  divalproex sodium 500 mg oral tablet, extended release: 1 tab(s) orally once a day (at bedtime)  haloperidol 2 mg oral tablet: 1 tab(s) orally every 6 hours, As needed, agitation  LORazepam 2 mg/mL injectable solution: injectable every 6 hours, As Needed for agitation  risperiDONE 1 mg oral tablet: 1 tab(s) orally 2 times a day

## 2020-02-27 NOTE — ED BEHAVIORAL HEALTH ASSESSMENT NOTE - SUMMARY
This is a 55yo AAF, unmarried though in a relationship, mother of adult children, domiciled with sister and nephew, employed as aide for the elderly, with PPHx bipolar disorder, multiple past psychiatric hospitalizations (last at [facility] in 2017, most recently 2/25/2020), no known prior SI/SA, no reported PMH, who was brought in by EMS from [facility] following a syncopal episode. She was initially admitted to [facility] two days ago for aggressive behavior. Psychiatry was consulted for evaluation and for readmission to [facility].    Patient calm and cooperative today, though based on recent history likely with acute decompensation of bipolar disorder in setting of noncompliance with medication. Patient was deemed to meet criteria for inpatient psychiatric admission 2 days ago for aggressive behavior at home, posing threat to self and others. Following medical stabilization, patient to be readmitted to [facility].

## 2020-02-27 NOTE — H&P ADULT - PROBLEM SELECTOR PLAN 2
- Continue Risperidone, Divalproex  - Pending complete psychiatry evaluation  - May need to titrate down sedating medications for agitation - Continue Risperidone, Divalproex  - Pending complete psychiatry evaluation  - May need to titrate down sedating medications for agitation  - 1:1

## 2020-02-27 NOTE — H&P ADULT - HISTORY OF PRESENT ILLNESS
54 year old female with with a history of bipolar disorder, pre-diabetes currently admitted to [facility] after suspected fall.  Prior to the fall, patient had received the following sedative cocktail for sleep: haloperidol, diphenhydramine, and benzodiazepine. The patient went to sleep, and a thud was heard in her room. The staff found her on the floor and sent her for evaluation.     At the time of my exam, patient was AAOx3. However, she explained that yesterday she was at work and then went home to her house. She reports she is here because her sister keeps telling her to eat and she does not like the food she is given, so she does not eat. In addition, endorsed hearing another voice (aside mine) telling her to run if necessary and providing her advice. She denies any thoughts of harming herself or anyone else. 54 year old female with with a history of bipolar disorder, pre-diabetes currently admitted to [facility] after suspected fall. Due to mental status, history was obtained from transfer record. Prior to the fall, patient had received the following sedative cocktail for sleep at 20:44: 5 mg haloperidol, 50 mg diphenhydramine, and 2 mg Ativan benzodiazepine. The patient was standing in the hallway, when she suddenly fell back, hit the occipital portion of her head, and a thud was heard. Immediately after falling, she was responsive to motor and verbal stimuli, following commands, and moving all extremities spontaneously. VS: 84/54, HR 60, saturating 97% on room air. .    At the time of my exam, patient was AAOx3. However, she explained that yesterday she was at work and then went home to her house. She reports she is here because her sister keeps telling her to eat and she does not like the food she is given, so she does not eat. In addition, endorsed hearing another voice (aside mine) telling her to run if necessary and providing her advice. She denies any thoughts of harming herself or anyone else.    Unable to obtain complete history due to mental status.    In the ED, VS were within normal limits. ECG showed 1 mm J point elevation in V2, with concern for STEMI, so cardiology was consulted. Felt unlikely ACS, no STEMI, and recommended trending trops and repeat TTE in AM

## 2020-02-27 NOTE — DISCHARGE NOTE NURSING/CASE MANAGEMENT/SOCIAL WORK - NSDCFUADDAPPT_GEN_ALL_CORE_FT
-Follow up in [facility] inpatient facility immediately upon discharge   -You will need QTC monitoring in 1 week

## 2020-02-27 NOTE — ED BEHAVIORAL HEALTH ASSESSMENT NOTE - OTHER PAST PSYCHIATRIC HISTORY (INCLUDE DETAILS REGARDING ONSET, COURSE OF ILLNESS, INPATIENT/OUTPATIENT TREATMENT)
PPHx bipolar disorder, 1 prior hospitalization at [facility] in 2017 and another recent admission (BIBEMS from [facility] this morning)

## 2020-02-27 NOTE — ED PROVIDER NOTE - PROGRESS NOTE DETAILS
pt reported to have sat up and bp dropped to 80's, so a second liter given. pt re evaluated and is less somnolent, protecting airway, ct head neg, trop neg. no clear etiology of hypotension, meds (?) admitted to medicine for supportive care and further evalution. hg stable, no evidence of gi bleed

## 2020-02-27 NOTE — ED BEHAVIORAL HEALTH ASSESSMENT NOTE - PRIVATE RESIDENCE
Lives at home with sister and nephew; however, admitted directly from [facility]

## 2020-02-27 NOTE — CONSULT NOTE ADULT - SUBJECTIVE AND OBJECTIVE BOX
Patient seen and evaluated at bedside    Chief Complaint: Fall/AMS    HPI: Pt seen/examined at bedside, however pt quite delirious and unable to provide any hx. Hx obtained entirely from OSH records. Briefly, 55 y/o F w/ PMH of bipolar d/o currently admitted to [facility] referred after suspected fall. Pt apparently received strong sedative cocktail for sleep at night composed of high doses of haloperidol, diphenhydramine, and BZDs. She received such a cocktail tonight. At some poin during the night, a thud was heard in her room; when staff went to eval her, she was found down on the floor. She is not able to elucidate what occurred at home. She is oriented to self only at time of my exam. She does follow simple commands (stick out tongue, lift both arms). A head to toe ROS is grossly pos (headache, chest pain, stomach ache, back ache, etc), and does not appear reliable.      PMHx: As above      Allergies:  ampicillin (Other (Mild))      Home Meds: Depakote, risperidone    Current Medications:   sodium chloride 0.9% Bolus 1000 milliLiter(s) IV Bolus once      FAMILY HISTORY: Unable to reliably obtain      Social History:  Smoking History: Unable to reliably obtain  Alcohol Use: Unable to reliably obtain  Drug Use: Unable to reliably obtain    REVIEW OF SYSTEMS:  Unable to reliably obtain    Physical Exam:  T(F): 99.3 (02-27), Max: 99.3 (02-27)  HR: 82 (02-27) (79 - 82)  BP: 97/75 (02-27) (97/75 - 97/75)  RR: 16 (02-27)  SpO2: 95% (02-27)  Gen: Somnolent. Oriented to self only. Follows simple commands.  HEENT: NCAT. PERRLA b/l.  Neck: No JVP elev.  CV: Normal S1, S2. RRR. No MRG.  Chest: CTAB. No WRR.  Abd: +BSx4. Soft. NTND.  Ext: No LE edema.  Skin: No cyanosis.    Cardiovascular Diagnostic Testing:    ECG: Personally reviewed: NSR. There are 1-1.5mm J point elevs in V2, but no such elevs in contiguous leads.    Imaging:    Labs: Personally reviewed                        11.8   7.59  )-----------( 216      ( 27 Feb 2020 01:37 )             37.8

## 2020-02-27 NOTE — ED PROVIDER NOTE - CLINICAL SUMMARY MEDICAL DECISION MAKING FREE TEXT BOX
Attending Perla:  53 y/o f prsenting frm [facility], reported to have a thud on the floor, and has hematoma to r pareietal head, reported to be hypotensive 80/40's and altered prehospital. pt unable to give infor. of note pt gets haldol, antihistamines, and maksim agonists qhs, no report of other drugs. pt can answer oreintation questions with redirection but provides no other info. hypotensive 90's systolic, fluids running, no sig tachycardia, no fever or signs of sepsis, hemaoma to head, no other signs of trauma, no c/t/l/s spine ttp. abd soft nt, no pin point pupils or toxidrome. ekg shows t wave inversions with no old, questionable elevation so cardiolgy was consulted emergently, plan for trops. placed for cardiac/infectious workup ct head. frequent re evaluations. bp responding to fluids

## 2020-02-27 NOTE — DISCHARGE NOTE PROVIDER - NSDCFUADDAPPT_GEN_ALL_CORE_FT
-Follow up in [facility] inpatient facility immediately upon discharge   -You will need QTC monitoring in 1 week

## 2020-02-27 NOTE — ED BEHAVIORAL HEALTH ASSESSMENT NOTE - DESCRIPTION
Denied chest pain or SOB. No abdominal pain nausea or vomiting. No numbness no weakness. En route patient was hypotensive and rhythm strip was concerning with ST elevations. Cardiology consulted and stated that pt did not meet criteria for STEMI. Utox negative, head CT negative. 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

## 2020-02-27 NOTE — H&P ADULT - NSHPPHYSICALEXAM_GEN_ALL_CORE
T(C): 36.7 (02-27-20 @ 07:06), Max: 37.4 (02-27-20 @ 01:40)  HR: 97 (02-27-20 @ 07:06) (74 - 97)  BP: 108/63 (02-27-20 @ 07:06) (97/75 - 118/67)  RR: 19 (02-27-20 @ 07:06) (15 - 19)  SpO2: 100% (02-27-20 @ 07:06) (95% - 100%)  General: Middle aged female, comfortable  Eyes: no conjunctival erythema, EOMI  ENT: MMM, Normal dentition  Neck: Neck supple, No JVD  Respiratory: CTA B/L, No wheezing, rales, rhonchi  CV: RRR no murmurs, rubs or gallops  Abdominal: Soft, NT, ND +BS  MSK: no focal weakness, no muscular pain, no joint swelling or pain or erythema  Extremities: No edema, 2+ peripheral pulses (Radial)  Neurology: A&Ox3, nonfocal, GRADY x 4, no facial droop  Skin: No Rashes, Hematoma, Ecchymosis  Psych: AAOx3, auditory hallucinations, otherwise Calm and appropriate

## 2020-02-27 NOTE — DISCHARGE NOTE NURSING/CASE MANAGEMENT/SOCIAL WORK - PATIENT PORTAL LINK FT
Spoke with pt, went over PCP instructions on dosing of Neurontin. Pt states she understands & will only take an extra dose of 300 mg @ noc if needed.  Marivel Klein RN   You can access the FollowMyHealth Patient Portal offered by [facility] by registering at the following website: [email or web address]. By joining [employer]’s FollowMyHealth portal, you will also be able to view your health information using other applications (apps) compatible with our system.

## 2020-02-27 NOTE — ED PROVIDER NOTE - CONSTITUTIONAL, MLM
normal... Well appearing, asleep but arousable, alert, oriented to person, place, time/situation and in no apparent distress.
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
## 2020-02-27 NOTE — ED BEHAVIORAL HEALTH ASSESSMENT NOTE - DETAILS
Adult Child Admitted at [facility] for one day, brought directly to [facility] following syncopal episode see above Head pain Readmit to Dr. Chu with handoff information team aware Notes reviewed

## 2020-02-27 NOTE — ED BEHAVIORAL HEALTH ASSESSMENT NOTE - CURRENTLY ENROLLED STUDENT
----- Message from Meek Chaney MD sent at 8/8/2019  9:33 AM CDT -----  Please call pt with abnormal results. Pt does not need appt at this time, unless they have questions or wish to further discuss.  E. Coli, cont meds  
Unable to contact pt. No v/m set up  
No

## 2020-02-27 NOTE — ED ADULT NURSE NOTE - NSIMPLEMENTINTERV_GEN_ALL_ED
Implemented All Fall with Harm Risk Interventions:  [place] to call system. Call bell, personal items and telephone within reach. Instruct patient to call for assistance. Room bathroom lighting operational. Non-slip footwear when patient is off stretcher. Physically safe environment: no spills, clutter or unnecessary equipment. Stretcher in lowest position, wheels locked, appropriate side rails in place. Provide visual cue, wrist band, yellow gown, etc. Monitor gait and stability. Monitor for mental status changes and reorient to person, place, and time. Review medications for side effects contributing to fall risk. Reinforce activity limits and safety measures with patient and family. Provide visual clues: red socks.

## 2020-02-27 NOTE — ED PROVIDER NOTE - ATTENDING CONTRIBUTION TO CARE
MD Duncan:  patient seen and evaluated personally.   I agree with the History & Physical,  Impression & Plan other than what was detailed in my note.  MD Duncan  see mdm

## 2020-02-27 NOTE — ED PROVIDER NOTE - CRITICAL CARE PROVIDED
consult w/ pt's family directly relating to pts condition/documentation/direct patient care (not related to procedure)/interpretation of diagnostic studies/additional history taking

## 2020-02-27 NOTE — H&P ADULT - PROBLEM SELECTOR PLAN 4
Dispo: Pending TTE  Transitions of Care Status:  1.  Name of PCP: Psych facility aware  2.  PCP Contacted on Admission: [ ] Y    [x] N Facility contacted via SW    3.  PCP contacted at Discharge: [ ] Y    [ ] N    [ ] N/A  4.  Post-Discharge Appointment Date and Location:  5.  Summary of Handoff given to PCP: Dispo: Pending TTE, cardiology clearance  Transitions of Care Status:  1.  Name of PCP: Psych facility aware  2.  PCP Contacted on Admission: [ ] Y    [x] N Facility contacted via SW    3.  PCP contacted at Discharge: [ ] Y    [ ] N    [ ] N/A  4.  Post-Discharge Appointment Date and Location:  5.  Summary of Handoff given to PCP:

## 2020-02-27 NOTE — ED PROVIDER NOTE - OBJECTIVE STATEMENT
54 y.o. female history of bipolar disorder resident of [facility] brought in by EMS after a syncopal episode.  Staff heard a thud, went to find the patient on the floor.  Was arousable but not back to her baseline, although pt does get heavy sedation  before bed every night.  Pt currently arousable to vopice but quickly falls back asleep, complains of pain in the head.  NO chest pain or SOB.  No abd pain nausea or vomiting.  No numbness no weakness.  En route was hypotensive and rhythm strip was concerning for STEMI.  Pt currently without chest pain.

## 2020-02-27 NOTE — H&P ADULT - NSHPLABSRESULTS_GEN_ALL_CORE
Labs personally reviewed:                          11.8   7.59  )-----------( 216      ( 27 Feb 2020 01:37 )             37.8       02-27    140  |  103  |  11  ----------------------------<  116<H>  4.1   |  22  |  0.86    Ca    8.8      27 Feb 2020 01:37  Mg     2.2     02-27    TPro  6.8  /  Alb  3.4  /  TBili  0.3  /  DBili  x   /  AST  53<H>  /  ALT  36  /  AlkPhos  46  02-27          CARDIAC MARKERS ( 27 Feb 2020 05:30 )  x     / x     / 1180 U/L / x     / 4.2 ng/mL          Troponin T, High Sensitivity Result: <6 ng/L (02-27-20 @ 05:30)  Troponin T, High Sensitivity Result: <6 ng/L (02-27-20 @ 03:37)  Troponin T, High Sensitivity Result: <6 ng/L (02-27-20 @ 01:37)      Imaging personally reviewed:  CXR: Clear lungs    EKG personally reviewed: NSR: 1mm elevation V1-2, TWI in V4-6, II, III, aVF Labs personally reviewed:                          11.8   7.59  )-----------( 216      ( 27 Feb 2020 01:37 )             37.8       02-27    140  |  103  |  11  ----------------------------<  116<H>  4.1   |  22  |  0.86    Ca    8.8      27 Feb 2020 01:37  Mg     2.2     02-27    TPro  6.8  /  Alb  3.4  /  TBili  0.3  /  DBili  x   /  AST  53<H>  /  ALT  36  /  AlkPhos  46  02-27          CARDIAC MARKERS ( 27 Feb 2020 05:30 )  x     / x     / 1180 U/L / x     / 4.2 ng/mL          Troponin T, High Sensitivity Result: <6 ng/L (02-27-20 @ 05:30)  Troponin T, High Sensitivity Result: <6 ng/L (02-27-20 @ 03:37)  Troponin T, High Sensitivity Result: <6 ng/L (02-27-20 @ 01:37)      Imaging personally reviewed:  CXR: Clear lungs    EKG personally reviewed:   1. NSR: 1mm elevation V1-2, TWI in V4-6, II, III, aVF  2. Repeat ECG 23:00, NSR, no ST changes

## 2020-02-27 NOTE — DISCHARGE NOTE PROVIDER - HOSPITAL COURSE
54 year old female with with a history of bipolar disorder, pre-diabetes currently admitted to [facility] presents after suspected fall.        PT admitted for Fall, Likely due to sedating medications verse less likely cardiac event    Troponin negative x <6 . Echo pending . Head CT done Scalp hematoma no acute intracranial CT finding. Had CT CERVICAL SPINE: No fracture or traumatic subluxation. Cardiology evaluated and recommended for         Also seen by Psych for Bipolar disease, currently requiring 1:1 for Safety monitoring.      Home medications continued-  Risperidone, Divalproex.  Psychiatry evaluation for medication management, May need to titrate down sedating medications for agitation. 54 year old female with with a history of bipolar disorder, pre-diabetes currently admitted to [facility] presents after suspected fall.        PT admitted for Fall, Likely due to sedating medications verse less likely cardiac event    Troponin negative x <6 . Echo pending . Head CT done Scalp hematoma no acute intracranial CT finding. Had CT CERVICAL SPINE: No fracture or traumatic subluxation. Cardiology evaluated and recommended TTE, which was unrevealing for any acute pathology to explain event.        Also seen by Psych for Bipolar disease, currently requiring 1:1 for Safety monitoring.      Home medications continued-  Risperidone, Divalproex.  Psychiatry evaluation for medication management, May need to titrate down sedating medications for agitation. 54 year old female with with a history of bipolar disorder, pre-diabetes currently admitted to [facility] presents after suspected fall.        PT admitted for Fall, Likely due to sedating medications verse less likely cardiac event    Troponin negative x <6 . Echo pending . Head CT done Scalp hematoma no acute intracranial CT finding. Had CT CERVICAL SPINE: No fracture or traumatic subluxation. Cardiology evaluated and recommended TTE, which was unrevealing for any acute pathology to explain event.        Also seen by Psych for Bipolar disease, currently requiring 1:1 for Safety monitoring.      Home medications continued-  Risperidone, Divalproex.  Psychiatry evaluation for medication management, May need to titrate down sedating medications for agitation. Recommended 1 mg Risperidone BID, and Haldol 2-4 mg PRN for agitation 54 year old female with with a history of bipolar disorder, pre-diabetes currently admitted to [facility] presents after suspected fall.        PT admitted for Fall, Likely due to sedating medications verse less likely cardiac event    Troponin negative x <6 . Echo pending . Head CT done Scalp hematoma no acute intracranial CT finding. Had CT CERVICAL SPINE: No fracture or traumatic subluxation. Cardiology evaluated and recommended TTE, which was unrevealing for any acute pathology to explain event. Discussed with cardiology, patient was medically cleared for discharge.        Also seen by Psych for Bipolar disease, currently requiring 1:1 for Safety. Psychiatry evaluation for medication management, May need to titrate down sedating medications for agitation. Recommended 1 mg Risperidone BID, and Haldol 2-4 mg PRN for agitation. This was continued upon discharge to [facility] 54 year old female with with a history of bipolar disorder, pre-diabetes currently admitted to [facility] presents after suspected fall.        PT admitted for Fall, Likely due to sedating medications verse less likely cardiac event    Troponin negative x <6 . Echo done. Head CT done Scalp hematoma no acute intracranial CT finding. Had CT CERVICAL SPINE: No fracture or traumatic subluxation. Cardiology evaluated and recommended TTE, which was unrevealing for any acute pathology to explain event. Discussed with cardiology, patient was medically cleared for discharge.        Also seen by Psych for Bipolar disease, currently requiring 1:1 for Safety. Psychiatry evaluation for medication management, May need to titrate down sedating medications for agitation. Recommended 1 mg Risperidone BID, and Haldol 2-4 mg PRN for agitation. This was continued upon discharge to [facility]

## 2020-02-27 NOTE — ED ADULT NURSE REASSESSMENT NOTE - NS ED NURSE REASSESS COMMENT FT1
pt placed on constant observation, pt transferred from [facility] for s/p fall with altered mental status, pt transferred will staff member [place] [facility] pt has history of bipolar. pt transfer in hospital gown with no belongings. pca tricia at bedside within arm reach. pt placed on constant observation for self protection and elopement risk, pt transferred from [facility] for s/p fall with altered mental status, pt transferred will staff member [place] [facility], pt has history of bipolar. pt transfer in hospital gown with no belongings. environment checked for safety cords needed for monitoring. pca tricia at bedside within arm reach.

## 2020-02-27 NOTE — ED ADULT NURSE REASSESSMENT NOTE - NS ED NURSE REASSESS COMMENT FT1
Patient report received from emily RN. Patient sleepy, responding to questions, a/ox3. No acute distress noted. 1:1 in place for self protection and elopement risk. Awaiting Tele bed at this time.

## 2020-02-27 NOTE — H&P ADULT - NSHPREVIEWOFSYSTEMS_GEN_ALL_CORE
Unreliable due to mental status, however, provided the following information.  REVIEW OF SYSTEMS:    CONSTITUTIONAL: No weakness, fevers or chills  EYES: no blurry vision or eye pain.   ENT: No throat pain. No dysphagia.    NECK: No pain or stiffness  RESPIRATORY: No cough, wheezing, hemoptysis; No shortness of breath  CARDIOVASCULAR: No chest pain or palpitations.  GASTROINTESTINAL: No abdominal pain. No nausea or vomiting; No diarrhea or constipation. No melena or hematochezia.  GENITOURINARY: No dysuria, frequency or hematuria  NEUROLOGICAL: No numbness or weakness. No dizziness or falls.   SKIN: No itching, burning, rashes, or lesions.   LYMPHATIC: No masses or swelling.   All other review of systems is negative unless indicated above.

## 2020-02-27 NOTE — ED BEHAVIORAL HEALTH ASSESSMENT NOTE - PSYCHIATRIC ISSUES AND PLAN (INCLUDE STANDING AND PRN MEDICATION)
Patient with history of bipolar disorder, reportedly with response to risperidone/invega sustenna in the past. Would recommend starting standing risperidone 1mg BID. PRN Haldol 2-4mg i.v. Q6H PRN.

## 2020-02-27 NOTE — CONSULT NOTE ADULT - ATTENDING COMMENTS
Agree with plan as outlined above.  Please obtain formal echocardiogram prior to D/c   Please use caution with psychiatric medications and Qtc Agree with plan as outlined above.  Echo reviewed personally 2-27 5:35 PM  - Normal  - May Discharge  Leobardo Rod MD  Please use caution with psychiatric medications and Qtc

## 2020-02-27 NOTE — CONSULT NOTE ADULT - ASSESSMENT
55 y/o F w/ PMH of bipolar d/o currently admitted to [facility] referred after suspected fall. Cards asked to eval for possible STEMI ECG.    #Abnormal ECG  -Does not currently meet STEMI criteria  -Clinically do not suspect ACS, however eval is limited by pt's inability to provide a hx  -Would obtain serial ECG [id number] over next 1-2 hours  -Serial Scout (hs-cTn/CK/CKMB) incl hs-cTn Q1H for first 2 sets  -C/w cardiac monitoring  -Please notify cardiology if pt wakes up or c/o any CP. Also notify for any abnormal labs/ECGs.  -Bedside POCUS observed in ED w/o any RWMA  -Can give empiric  now and 81mg daily thereafter    #AMS  #Fall  -Suspect 2/2 medication sedation  -Would eval for any other toxic-metabolic insults  -It is not clear that fall was related to any cardiac/syncopal event. Can elucidate once pt can provide hx.    #Will d/w attg in AM  #Call w/ any Qs    Ryan Alves MD  Cardiology Fellow  [phone]  All Cardiology service information can be found 24/7 on [email or web address], password: [email or web address] 53 y/o F w/ PMH of bipolar d/o currently admitted to [facility] referred after suspected fall. Cards asked to eval for possible STEMI ECG.    #Abnormal ECG  -Does not currently meet STEMI criteria  -Clinically do not suspect ACS, however eval is limited by pt's inability to provide a hx  -Would obtain serial ECG [id number] over next 1-2 hours  -Serial Scout (hs-cTn/CK/CKMB) incl hs-cTn Q1H for first 2 sets  -C/w cardiac monitoring  -Please notify cardiology if pt wakes up or c/o any CP. Also notify for any abnormal labs/ECGs.  -Bedside POCUS observed in ED w/o any RWMA  -Can give empiric  now and 81mg daily thereafter  -Addendum: get full TTE tomorrow given TWI in inf-lat leads    #AMS  #Fall  -Suspect 2/2 medication sedation  -Would eval for any other toxic-metabolic insults  -It is not clear that fall was related to any cardiac/syncopal event. Can elucidate once pt can provide hx.    #Will d/w attg in AM  #Call w/ any Qs    Ryan Alves MD  Cardiology Fellow  [phone]  All Cardiology service information can be found 24/7 on [email or web address], password: [email or web address] 55 y/o F w/ PMH of bipolar d/o currently admitted to [facility] referred after suspected fall. Cards asked to eval for possible STEMI ECG.    Echo reviewed personally 2-27 5:35 PM  - Normal  - May Discharge  Leobardo Rod MD    #Abnormal ECG  -Does not currently meet STEMI criteria  -Clinically do not suspect ACS, however eval is limited by pt's inability to provide a hx  -Would obtain serial ECG [id number] over next 1-2 hours  -Serial Scout (hs-cTn/CK/CKMB) incl hs-cTn Q1H for first 2 sets  -C/w cardiac monitoring  -Please notify cardiology if pt wakes up or c/o any CP. Also notify for any abnormal labs/ECGs.  -Bedside POCUS observed in ED w/o any RWMA  -Can give empiric  now and 81mg daily thereafter  -Addendum: get full TTE tomorrow given TWI in inf-lat leads    #AMS  #Fall  -Suspect 2/2 medication sedation  -Would eval for any other toxic-metabolic insults  -It is not clear that fall was related to any cardiac/syncopal event. Can elucidate once pt can provide hx.    #Will d/w attg in AM  #Call w/ any Qs    Ryan Alves MD  Cardiology Fellow  [phone]  All Cardiology service information can be found 24/7 on [email or web address], password: [email or web address]

## 2020-02-27 NOTE — CHART NOTE - NSCHARTNOTEFT_GEN_A_CORE
[facility] Inpatient to ED Transfer Summary    Reason for Transfer/Medical Summary: Syncopal episode  Patient had witnessed fall. As per staff, patient had been sleeping after receiving PRNs (haldol 5, ativan 2, benadryl 50 at 20:44). patient was initially standing in the hallways, and as per witness, patient had sudden lost consciousness prior to falling back, hit occipital area of head on the ground. Patient responsive to sternal rub and verbal stimuli, obeys commands, moving extremities spontaneously. Initial VS 84/54, HR 60, , O2 sat 97-99    PAST MEDICAL & SURGICAL HISTORY:  Bipolar 1 disorder  Psychiatric disorder: on rispiradone  H/O breast surgery      Allergies    ampicillin (Anaphylaxis)  ampicillin (Unknown)  eggplant causes rash (Rash)    Intolerances        MEDICATIONS  (STANDING):  diVALproex  milliGRAM(s) Oral at bedtime  risperiDONE   Tablet 1 milliGRAM(s) Oral at bedtime    MEDICATIONS  (PRN):  diphenhydrAMINE 50 milliGRAM(s) Oral every 6 hours PRN agitation/EPS prophylaxis  diphenhydrAMINE   Injectable 50 milliGRAM(s) IntraMuscular once PRN agitation/EPS prophylaxis  haloperidol     Tablet 5 milliGRAM(s) Oral every 6 hours PRN agitation  haloperidol    Injectable 5 milliGRAM(s) IntraMuscular once PRN agitation  LORazepam     Tablet 2 milliGRAM(s) Oral every 6 hours PRN agitation  LORazepam   Injectable 2 milliGRAM(s) IntraMuscular once PRN agitation      Vital Signs Last 24 Hrs  T(C): 36.3 (2020 20:55), Max: 36.9 (2020 03:06)  T(F): 97.4 (2020 20:55), Max: 98.5 (2020 03:06)  HR: 82 (2020 03:06) (82 - 82)  BP: 108/81 (2020 03:06) (108/81 - 108/81)  BP(mean): --  RR: 18 (2020 04:15) (17 - 18)  SpO2: 98% (2020 04:15) (96% - 98%)  CAPILLARY BLOOD GLUCOSE      POCT Blood Glucose.: 121 mg/dL (2020 00:11)        PHYSICAL EXAM:  GENERAL: NAD, well-developed, pale  HEAD:  Normocephalic  EYES: conjunctiva and sclera clear  NECK: Supple, No JVD  CHEST/LUNG: Clear to auscultation bilaterally; No wheeze  HEART: Regular rhythm; No murmurs, rubs, or gallops, +carotid pulse  EXTREMITIES:  2+ Peripheral Pulses, warm extremities, pale  NEUROLOGY: responding to verbal stimuli, obeys commands  SKIN: No rashes or lesions    LABS:                        13.2   7.32  )-----------( 327      ( 2020 18:48 )             41.0     02    138  |  98  |  14  ----------------------------<  121<H>  3.5   |  21<L>  |  0.81    Ca    9.7      2020 18:48    TPro  7.9  /  Alb  4.4  /  TBili  0.4  /  DBili  x   /  AST  68<H>  /  ALT  45<H>  /  AlkPhos  58            Urinalysis Basic - ( 2020 00:30 )    Color: YELLOW / Appearance: CLEAR / S.019 / pH: 6.0  Gluc: NEGATIVE / Ketone: SMALL  / Bili: NEGATIVE / Urobili: NORMAL   Blood: NEGATIVE / Protein: 20 / Nitrite: NEGATIVE   Leuk Esterase: NEGATIVE / RBC: 0-2 / WBC 0-2   Sq Epi: OCC / Non Sq Epi: x / Bacteria: FEW        Psychiatry Section:  Psychiatric Summary/[facility] admitting diagnosis: Bipolar 1 disorder    Psychiatric Recommendations: continue meds as prescribed    Observation status (check one):   (x ) Constant Observation  ( ) Enhanced care  ( ) Routine checks    Risk Status (check all that apply if present):  ( ) at risk for suicide/self-injury  ( x) at risk for aggressive behavior  (x ) at risk for elopement  ( ) other risk: [facility] Inpatient to ED Transfer Summary    Reason for Transfer/Medical Summary: Syncopal episode  Patient had witnessed fall. As per staff, patient had been sleeping after receiving PRNs (haldol 5, ativan 2, benadryl 50 at 20:44). patient was initially standing in the hallways, and as per witness, patient had sudden lost consciousness prior to falling back, hit occipital area of head on the ground. Patient responsive to sternal rub and verbal stimuli, obeys commands, moving extremities spontaneously. Initial VS 84/54, HR 60, , O2 sat 97-99    PAST MEDICAL & SURGICAL HISTORY:  Bipolar 1 disorder  Psychiatric disorder: on rispiradone  H/O breast surgery      Allergies    ampicillin (Anaphylaxis)  ampicillin (Unknown)  eggplant causes rash (Rash)    Intolerances        MEDICATIONS  (STANDING):  diVALproex  milliGRAM(s) Oral at bedtime  risperiDONE   Tablet 1 milliGRAM(s) Oral at bedtime    MEDICATIONS  (PRN):  diphenhydrAMINE 50 milliGRAM(s) Oral every 6 hours PRN agitation/EPS prophylaxis  diphenhydrAMINE   Injectable 50 milliGRAM(s) IntraMuscular once PRN agitation/EPS prophylaxis  haloperidol     Tablet 5 milliGRAM(s) Oral every 6 hours PRN agitation  haloperidol    Injectable 5 milliGRAM(s) IntraMuscular once PRN agitation  LORazepam     Tablet 2 milliGRAM(s) Oral every 6 hours PRN agitation  LORazepam   Injectable 2 milliGRAM(s) IntraMuscular once PRN agitation      Vital Signs Last 24 Hrs  T(C): 36.3 (2020 20:55), Max: 36.9 (2020 03:06)  T(F): 97.4 (2020 20:55), Max: 98.5 (2020 03:06)  HR: 82 (2020 03:06) (82 - 82)  BP: 108/81 (2020 03:06) (108/81 - 108/81)  BP(mean): --  RR: 18 (2020 04:15) (17 - 18)  SpO2: 98% (2020 04:15) (96% - 98%)  CAPILLARY BLOOD GLUCOSE      POCT Blood Glucose.: 121 mg/dL (2020 00:11)        PHYSICAL EXAM:  GENERAL: NAD, well-developed, pale  HEAD:  Normocephalic  EYES: conjunctiva and sclera clear, perrl  NECK: Supple, No JVD  CHEST/LUNG: Clear to auscultation bilaterally; No wheeze  HEART: Regular rhythm; No murmurs, rubs, or gallops, +carotid pulse  EXTREMITIES:  2+ Peripheral Pulses, warm extremities, pale  NEUROLOGY: responding to verbal stimuli, obeys commands  SKIN: No rashes or lesions    LABS:                        13.2   7.32  )-----------( 327      ( 2020 18:48 )             41.0         138  |  98  |  14  ----------------------------<  121<H>  3.5   |  21<L>  |  0.81    Ca    9.7      2020 18:48    TPro  7.9  /  Alb  4.4  /  TBili  0.4  /  DBili  x   /  AST  68<H>  /  ALT  45<H>  /  AlkPhos  58            Urinalysis Basic - ( 2020 00:30 )    Color: YELLOW / Appearance: CLEAR / S.019 / pH: 6.0  Gluc: NEGATIVE / Ketone: SMALL  / Bili: NEGATIVE / Urobili: NORMAL   Blood: NEGATIVE / Protein: 20 / Nitrite: NEGATIVE   Leuk Esterase: NEGATIVE / RBC: 0-2 / WBC 0-2   Sq Epi: OCC / Non Sq Epi: x / Bacteria: FEW        Psychiatry Section:  Psychiatric Summary/[facility] admitting diagnosis: Bipolar 1 disorder    Psychiatric Recommendations: continue meds as prescribed    Observation status (check one):   (x ) Constant Observation  ( ) Enhanced care  ( ) Routine checks    Risk Status (check all that apply if present):  ( ) at risk for suicide/self-injury  ( x) at risk for aggressive behavior  (x ) at risk for elopement  ( ) other risk:

## 2020-02-27 NOTE — H&P ADULT - ASSESSMENT
54 year old female with with a history of bipolar disorder, pre-diabetes currently admitted to [facility] presents after suspected fall.

## 2020-02-27 NOTE — DISCHARGE NOTE PROVIDER - NSDCCPCAREPLAN_GEN_ALL_CORE_FT
PRINCIPAL DISCHARGE DIAGNOSIS  Diagnosis: Syncope, unspecified syncope type  Assessment and Plan of Treatment:       SECONDARY DISCHARGE DIAGNOSES  Diagnosis: Fall, initial encounter  Assessment and Plan of Treatment: Fall precautions advised       Diagnosis: Bipolar disorder, current episode mixed, severe, with psychotic features  Assessment and Plan of Treatment: -Continue on your Home prescription   -Transfer to inpatient pysch PRINCIPAL DISCHARGE DIAGNOSIS  Diagnosis: Syncope, unspecified syncope type  Assessment and Plan of Treatment: Negative Troponins   s/p Echo- without any revealing disease   Cardiology evaluated and cleared for discharge, QTC monitoring required at facility  SEEK IMMEDIATE MEDICAL CARE IF:  You have a severe headache.  You have unusual pain in the chest, abdomen, or back.  You are bleeding from the mouth or rectum, or you have black or tarry stool.  You have an irregular or very fast heartbeat.  You have pain with breathing.  You have repeated fainting or seizure-like jerking during an episode.  You faint when sitting or lying down.  You have difficulty walking.  You have severe weakness.  You have vision problems.  If you fainted, call your local emergency services ([phone]). Do not drive yourself to the hospital        SECONDARY DISCHARGE DIAGNOSES  Diagnosis: Fall, initial encounter  Assessment and Plan of Treatment: Fall precautions advised       Diagnosis: Bipolar disorder, current episode mixed, severe, with psychotic features  Assessment and Plan of Treatment: -Continue on your Home prescription   -Transfer to inpatient [facility]

## 2020-02-27 NOTE — DISCHARGE NOTE PROVIDER - NSFOLLOWUPCLINICS_GEN_ALL_ED_FT
[facility] Psychiatry  Psychiatry  [address] [place] [address]  Phone: [phone]  Fax:   Follow Up Time:

## 2020-02-27 NOTE — H&P ADULT - PROBLEM SELECTOR PLAN 1
Likely due to sedating medatications verse less likely cardiac event  Troponin negative x <6  - Follow up TTE  - Cardiology recommendations  - Follow up psychiatry consult, ? titrate medications for agitation Likely due to sedating medatications verse less likely cardiac event  Troponin negative x <6  - Follow up TTE  - Follow up final Cardiology recommendations  - Follow up psychiatry consult, ? titrate medications for agitation  - She is ambulating with assistance this morning without issue. Likely due to sedating medatications verse less likely cardiac event  Troponin negative x <6  CK elevation ~1000  - Follow up TTE  - Follow up final Cardiology recommendations  - Follow up psychiatry consult, ? titrate medications for agitation  - She is ambulating with assistance this morning without issue.  - Encourage PO hydration

## 2020-02-28 LAB — CK SERPL-CCNC: 708 U/L — HIGH (ref 25–170)

## 2020-02-28 PROCEDURE — 99223 1ST HOSP IP/OBS HIGH 75: CPT

## 2020-02-28 PROCEDURE — 93308 TTE F-UP OR LMTD: CPT | Mod: 26

## 2020-02-28 PROCEDURE — 99232 SBSQ HOSP IP/OBS MODERATE 35: CPT

## 2020-02-28 PROCEDURE — 90853 GROUP PSYCHOTHERAPY: CPT

## 2020-02-28 RX ORDER — HALOPERIDOL DECANOATE 100 MG/ML
1 INJECTION INTRAMUSCULAR
Qty: 0 | Refills: 0 | DISCHARGE

## 2020-02-28 RX ORDER — LANOLIN ALCOHOL/MO/W.PET/CERES
3 CREAM (GRAM) TOPICAL ONCE
Refills: 0 | Status: COMPLETED | OUTPATIENT
Start: 2020-02-28 | End: 2020-02-29

## 2020-02-28 RX ORDER — RISPERIDONE 4 MG/1
1 TABLET ORAL
Qty: 0 | Refills: 0 | DISCHARGE

## 2020-02-28 RX ORDER — RISPERIDONE 4 MG/1
2 TABLET ORAL AT BEDTIME
Refills: 0 | Status: DISCONTINUED | OUTPATIENT
Start: 2020-02-28 | End: 2020-03-02

## 2020-02-28 RX ADMIN — RISPERIDONE 2 MILLIGRAM(S): 4 TABLET ORAL at 21:08

## 2020-02-28 RX ADMIN — DIVALPROEX SODIUM 500 MILLIGRAM(S): 500 TABLET, DELAYED RELEASE ORAL at 21:08

## 2020-02-28 NOTE — CONSULT NOTE ADULT - ASSESSMENT
54F admitted for psychosis, s/p brief admission to [facility] for evaluation of fall with head strike    Plan:  Fall: Due to sedating medications.  No serious injury.    Psychosis: Management per primary team

## 2020-02-28 NOTE — CONSULT NOTE ADULT - SUBJECTIVE AND OBJECTIVE BOX
HPI: Pt is 54F admitted to [facility] 2/25/20 for management of psychosis.  She had a sudden fall with head strike 2/27/20 and was admitted to [facility].  CT Head and C spine were neagtive for injury other than scalp hematoma.  The patient currently denies compalints except for bump on the side of her head.      PAST MEDICAL & SURGICAL HISTORY:  Bipolar 1 disorder  Psychiatric disorder: on rispiradone  H/O breast surgery      Review of Systems:   CONSTITUTIONAL: No fever, weight loss, or fatigue  EYES: No eye pain, visual disturbances, or discharge  ENMT:  No difficulty hearing, tinnitus, vertigo; No sinus or throat pain  NECK: No pain or stiffness  RESPIRATORY: No cough, wheezing, chills or hemoptysis; No shortness of breath  CARDIOVASCULAR: No chest pain, palpitations, dizziness, or leg swelling  GASTROINTESTINAL: No abdominal or epigastric pain. No nausea, vomiting, or hematemesis; No diarrhea or constipation. No melena or hematochezia.  GENITOURINARY: No dysuria, frequency, hematuria, or incontinence  NEUROLOGICAL: No headaches, memory loss, loss of strength, numbness, or tremors  SKIN: No itching, burning, rashes, or lesions   LYMPH NODES: No enlarged glands  ENDOCRINE: No heat or cold intolerance; No hair loss  MUSCULOSKELETAL: No joint pain or swelling; No muscle, back, or extremity pain  HEME/LYMPH: No easy bruising, or bleeding gums  ALLERY AND IMMUNOLOGIC: No hives or eczema    Allergies    ampicillin (Anaphylaxis)  ampicillin (Unknown)  eggplant causes rash (Rash)    Intolerances        Social History: denies etoh tob idu    FAMILY HISTORY: noncontributory      MEDICATIONS  (STANDING):  diVALproex  milliGRAM(s) Oral at bedtime  risperiDONE   Tablet 2 milliGRAM(s) Oral at bedtime    MEDICATIONS  (PRN):  diphenhydrAMINE 50 milliGRAM(s) Oral every 6 hours PRN agitation  diphenhydrAMINE   Injectable 50 milliGRAM(s) IntraMuscular once PRN agitation  haloperidol     Tablet 5 milliGRAM(s) Oral every 6 hours PRN agitation  haloperidol    Injectable 5 milliGRAM(s) IntraMuscular once PRN agitation  LORazepam     Tablet 2 milliGRAM(s) Oral every 6 hours PRN agitation  LORazepam   Injectable 2 milliGRAM(s) IntraMuscular once PRN agitation      Vital Signs Last 24 Hrs  T(C): 36.5 (28 Feb 2020 08:47), Max: 37.2 (27 Feb 2020 22:00)  T(F): 97.7 (28 Feb 2020 08:47), Max: 98.9 (27 Feb 2020 22:00)  HR: 82  BP: 114/62  BP(mean): --  RR: 18 (27 Feb 2020 22:00) (18 - 18)  SpO2: 100% (27 Feb 2020 22:00) (100% - 100%)  CAPILLARY BLOOD GLUCOSE            PHYSICAL EXAM:  GENERAL: NAD, well-developed  HEAD:  small tender bump side of head   EYES: EOMI, conjunctiva and sclera clear  NECK: Supple, No JVD  CHEST/LUNG: Clear to auscultation bilaterally; No wheeze  HEART: Regular rate and rhythm; No murmurs, rubs, or gallops  ABDOMEN: Soft, Nontender, Nondistended; Bowel sounds present  EXTREMITIES:  2+ Peripheral Pulses, No clubbing, cyanosis, or edema  NEUROLOGY: non-focal  SKIN: No rashes or lesions    LABS:    Reviewed in sunrise (unlinked [facility] MRN)        CARDIAC MARKERS ( 28 Feb 2020 08:45 )  x     / x     / 708 u/L / x     / x                Care Discussed with Consultants/Other Providers: ERIBERTO Marie

## 2020-02-29 PROCEDURE — 99232 SBSQ HOSP IP/OBS MODERATE 35: CPT

## 2020-02-29 RX ADMIN — DIVALPROEX SODIUM 500 MILLIGRAM(S): 500 TABLET, DELAYED RELEASE ORAL at 20:24

## 2020-02-29 RX ADMIN — RISPERIDONE 2 MILLIGRAM(S): 4 TABLET ORAL at 20:24

## 2020-02-29 RX ADMIN — Medication 3 MILLIGRAM(S): at 00:07

## 2020-03-01 PROCEDURE — 99232 SBSQ HOSP IP/OBS MODERATE 35: CPT

## 2020-03-01 RX ADMIN — DIVALPROEX SODIUM 500 MILLIGRAM(S): 500 TABLET, DELAYED RELEASE ORAL at 20:14

## 2020-03-01 RX ADMIN — RISPERIDONE 2 MILLIGRAM(S): 4 TABLET ORAL at 20:14

## 2020-03-01 RX ADMIN — Medication 2 MILLIGRAM(S): at 18:06

## 2020-03-02 DIAGNOSIS — Z71.89 OTHER SPECIFIED COUNSELING: ICD-10-CM

## 2020-03-02 PROCEDURE — 99232 SBSQ HOSP IP/OBS MODERATE 35: CPT

## 2020-03-02 RX ORDER — RISPERIDONE 4 MG/1
3 TABLET ORAL AT BEDTIME
Refills: 0 | Status: DISCONTINUED | OUTPATIENT
Start: 2020-03-02 | End: 2020-03-03

## 2020-03-02 RX ADMIN — RISPERIDONE 3 MILLIGRAM(S): 4 TABLET ORAL at 20:27

## 2020-03-02 RX ADMIN — DIVALPROEX SODIUM 500 MILLIGRAM(S): 500 TABLET, DELAYED RELEASE ORAL at 20:27

## 2020-03-03 PROCEDURE — 90853 GROUP PSYCHOTHERAPY: CPT

## 2020-03-03 PROCEDURE — 99232 SBSQ HOSP IP/OBS MODERATE 35: CPT

## 2020-03-03 RX ORDER — DIVALPROEX SODIUM 500 MG/1
1000 TABLET, DELAYED RELEASE ORAL AT BEDTIME
Refills: 0 | Status: DISCONTINUED | OUTPATIENT
Start: 2020-03-03 | End: 2020-03-05

## 2020-03-03 RX ORDER — RISPERIDONE 4 MG/1
4 TABLET ORAL AT BEDTIME
Refills: 0 | Status: DISCONTINUED | OUTPATIENT
Start: 2020-03-03 | End: 2020-03-05

## 2020-03-03 RX ADMIN — DIVALPROEX SODIUM 1000 MILLIGRAM(S): 500 TABLET, DELAYED RELEASE ORAL at 21:00

## 2020-03-03 RX ADMIN — RISPERIDONE 4 MILLIGRAM(S): 4 TABLET ORAL at 21:00

## 2020-03-04 VITALS — TEMPERATURE: 98 F | HEART RATE: 98 BPM | DIASTOLIC BLOOD PRESSURE: 74 MMHG | SYSTOLIC BLOOD PRESSURE: 116 MMHG

## 2020-03-04 LAB
CK SERPL-CCNC: 417 U/L — HIGH (ref 25–170)
VALPROATE SERPL-MCNC: 48.6 UG/ML — LOW (ref 50–100)

## 2020-03-04 PROCEDURE — 99231 SBSQ HOSP IP/OBS SF/LOW 25: CPT

## 2020-03-04 PROCEDURE — 90853 GROUP PSYCHOTHERAPY: CPT

## 2020-03-04 RX ORDER — RISPERIDONE 4 MG/1
1 TABLET ORAL
Qty: 30 | Refills: 0
Start: 2020-03-04 | End: 2020-04-02

## 2020-03-04 RX ORDER — DIVALPROEX SODIUM 500 MG/1
2 TABLET, DELAYED RELEASE ORAL
Qty: 60 | Refills: 0
Start: 2020-03-04 | End: 2020-04-02

## 2020-03-04 RX ORDER — DIVALPROEX SODIUM 500 MG/1
1 TABLET, DELAYED RELEASE ORAL
Qty: 0 | Refills: 0 | DISCHARGE

## 2020-03-04 RX ORDER — DIPHENHYDRAMINE HCL 50 MG
1 CAPSULE ORAL
Qty: 0 | Refills: 0 | DISCHARGE

## 2020-03-04 RX ORDER — DIPHENHYDRAMINE HCL 50 MG
50 CAPSULE ORAL
Qty: 0 | Refills: 0 | DISCHARGE

## 2020-03-04 RX ADMIN — DIVALPROEX SODIUM 1000 MILLIGRAM(S): 500 TABLET, DELAYED RELEASE ORAL at 21:24

## 2020-03-04 RX ADMIN — RISPERIDONE 4 MILLIGRAM(S): 4 TABLET ORAL at 21:24

## 2020-03-05 PROCEDURE — 99238 HOSP IP/OBS DSCHRG MGMT 30/<: CPT

## 2020-03-09 ENCOUNTER — OUTPATIENT (OUTPATIENT)
Dept: OUTPATIENT SERVICES | Facility: HOSPITAL | Age: 55
LOS: 1 days | Discharge: LEFT BEFORE DISCHARGE | End: 2020-03-09
Payer: MEDICAID

## 2020-03-09 DIAGNOSIS — Z98.890 OTHER SPECIFIED POSTPROCEDURAL STATES: Chronic | ICD-10-CM

## 2020-03-09 PROBLEM — F31.9 BIPOLAR DISORDER, UNSPECIFIED: Chronic | Status: ACTIVE | Noted: 2020-02-27

## 2020-03-09 PROCEDURE — 90792 PSYCH DIAG EVAL W/MED SRVCS: CPT

## 2020-05-28 DIAGNOSIS — F25.0 SCHIZOAFFECTIVE DISORDER, BIPOLAR TYPE: ICD-10-CM

## 2020-06-04 DIAGNOSIS — E78.5 HYPERLIPIDEMIA, UNSPECIFIED: ICD-10-CM

## 2020-07-13 NOTE — ED ADULT TRIAGE NOTE - NS ED NURSE BANDS TYPE
Patient lvm w answering service to call regarding  Order for test.   I spoke to patient- he was advised that he need an order for his kidney function to be tested. -Need to have done b4 can have cta   He is scheduled for cta for Thursday. Please fax to hospital at    Please call him once it has been sent   Name band;

## 2020-09-20 NOTE — ED ADULT NURSE NOTE - NS ED NURSE TRANSFER FORMS DT
Pt ambulatory to triage with assistance. Pt refuses w/c. , nasreen Lerma. Pt states she has LLQ pain radiating to the right. Pt still has appendix. Pt states scar on abd is from  and tubal ligation. Pt last BM yesterday and denies blood or mucus in stool. Pt states he has painful urination as well. Pt denies any GI hx. Pt reports nausea but denies vomiting. Pt denies vaginal bleeding or d/c, fever, cough, SOB. 27-Feb-2020 21:54

## 2020-12-10 NOTE — ED BEHAVIORAL HEALTH ASSESSMENT NOTE - NS ED BHA BILLING ATTENDING WO NP TRAINEE
Debridement Text: The wound edges were debrided prior to proceeding with the closure to facilitate wound healing. Other

## 2021-04-14 ENCOUNTER — EMERGENCY (EMERGENCY)
Facility: HOSPITAL | Age: 56
LOS: 0 days | Discharge: PSYCHIATRIC OR SUBSTANCE USE FACILITY | End: 2021-04-15
Attending: STUDENT IN AN ORGANIZED HEALTH CARE EDUCATION/TRAINING PROGRAM
Payer: MEDICAID

## 2021-04-14 VITALS
DIASTOLIC BLOOD PRESSURE: 78 MMHG | RESPIRATION RATE: 17 BRPM | OXYGEN SATURATION: 98 % | WEIGHT: 169.98 LBS | SYSTOLIC BLOOD PRESSURE: 150 MMHG | HEART RATE: 112 BPM | HEIGHT: 63 IN | TEMPERATURE: 99 F

## 2021-04-14 DIAGNOSIS — F25.0 SCHIZOAFFECTIVE DISORDER, BIPOLAR TYPE: ICD-10-CM

## 2021-04-14 DIAGNOSIS — F25.9 SCHIZOAFFECTIVE DISORDER, UNSPECIFIED: ICD-10-CM

## 2021-04-14 DIAGNOSIS — Z88.1 ALLERGY STATUS TO OTHER ANTIBIOTIC AGENTS STATUS: ICD-10-CM

## 2021-04-14 DIAGNOSIS — Z98.890 OTHER SPECIFIED POSTPROCEDURAL STATES: Chronic | ICD-10-CM

## 2021-04-14 DIAGNOSIS — F29 UNSPECIFIED PSYCHOSIS NOT DUE TO A SUBSTANCE OR KNOWN PHYSIOLOGICAL CONDITION: ICD-10-CM

## 2021-04-14 DIAGNOSIS — F31.9 BIPOLAR DISORDER, UNSPECIFIED: ICD-10-CM

## 2021-04-14 DIAGNOSIS — R41.82 ALTERED MENTAL STATUS, UNSPECIFIED: ICD-10-CM

## 2021-04-14 DIAGNOSIS — Z20.822 CONTACT WITH AND (SUSPECTED) EXPOSURE TO COVID-19: ICD-10-CM

## 2021-04-14 LAB
ALBUMIN SERPL ELPH-MCNC: 3.9 G/DL — SIGNIFICANT CHANGE UP (ref 3.3–5)
ALP SERPL-CCNC: 58 U/L — SIGNIFICANT CHANGE UP (ref 40–120)
ALT FLD-CCNC: 30 U/L — SIGNIFICANT CHANGE UP (ref 12–78)
AMPHET UR-MCNC: NEGATIVE — SIGNIFICANT CHANGE UP
ANION GAP SERPL CALC-SCNC: 9 MMOL/L — SIGNIFICANT CHANGE UP (ref 5–17)
APAP SERPL-MCNC: < 2 UG/ML (ref 10–30)
APPEARANCE UR: CLEAR — SIGNIFICANT CHANGE UP
AST SERPL-CCNC: 30 U/L — SIGNIFICANT CHANGE UP (ref 15–37)
BACTERIA # UR AUTO: ABNORMAL
BARBITURATES UR SCN-MCNC: NEGATIVE — SIGNIFICANT CHANGE UP
BASOPHILS # BLD AUTO: 0.03 K/UL — SIGNIFICANT CHANGE UP (ref 0–0.2)
BASOPHILS NFR BLD AUTO: 0.4 % — SIGNIFICANT CHANGE UP (ref 0–2)
BENZODIAZ UR-MCNC: NEGATIVE — SIGNIFICANT CHANGE UP
BILIRUB SERPL-MCNC: 0.5 MG/DL — SIGNIFICANT CHANGE UP (ref 0.2–1.2)
BILIRUB UR-MCNC: NEGATIVE — SIGNIFICANT CHANGE UP
BUN SERPL-MCNC: 16 MG/DL — SIGNIFICANT CHANGE UP (ref 7–23)
CALCIUM SERPL-MCNC: 9.4 MG/DL — SIGNIFICANT CHANGE UP (ref 8.5–10.1)
CHLORIDE SERPL-SCNC: 105 MMOL/L — SIGNIFICANT CHANGE UP (ref 96–108)
CO2 SERPL-SCNC: 26 MMOL/L — SIGNIFICANT CHANGE UP (ref 22–31)
COCAINE METAB.OTHER UR-MCNC: NEGATIVE — SIGNIFICANT CHANGE UP
COLOR SPEC: YELLOW — SIGNIFICANT CHANGE UP
CREAT SERPL-MCNC: 0.96 MG/DL — SIGNIFICANT CHANGE UP (ref 0.5–1.3)
DIFF PNL FLD: ABNORMAL
EOSINOPHIL # BLD AUTO: 0 K/UL — SIGNIFICANT CHANGE UP (ref 0–0.5)
EOSINOPHIL NFR BLD AUTO: 0 % — SIGNIFICANT CHANGE UP (ref 0–6)
EPI CELLS # UR: SIGNIFICANT CHANGE UP
ETHANOL SERPL-MCNC: <10 MG/DL — SIGNIFICANT CHANGE UP (ref 0–10)
FLUAV AG NPH QL: SIGNIFICANT CHANGE UP
FLUBV AG NPH QL: SIGNIFICANT CHANGE UP
GLUCOSE BLDC GLUCOMTR-MCNC: 108 MG/DL — HIGH (ref 70–99)
GLUCOSE SERPL-MCNC: 97 MG/DL — SIGNIFICANT CHANGE UP (ref 70–99)
GLUCOSE UR QL: NEGATIVE MG/DL — SIGNIFICANT CHANGE UP
HCT VFR BLD CALC: 39.6 % — SIGNIFICANT CHANGE UP (ref 34.5–45)
HGB BLD-MCNC: 12.8 G/DL — SIGNIFICANT CHANGE UP (ref 11.5–15.5)
HYALINE CASTS # UR AUTO: ABNORMAL /LPF
IMM GRANULOCYTES NFR BLD AUTO: 0.1 % — SIGNIFICANT CHANGE UP (ref 0–1.5)
KETONES UR-MCNC: ABNORMAL
LEUKOCYTE ESTERASE UR-ACNC: ABNORMAL
LYMPHOCYTES # BLD AUTO: 2.34 K/UL — SIGNIFICANT CHANGE UP (ref 1–3.3)
LYMPHOCYTES # BLD AUTO: 31 % — SIGNIFICANT CHANGE UP (ref 13–44)
MCHC RBC-ENTMCNC: 28 PG — SIGNIFICANT CHANGE UP (ref 27–34)
MCHC RBC-ENTMCNC: 32.3 GM/DL — SIGNIFICANT CHANGE UP (ref 32–36)
MCV RBC AUTO: 86.7 FL — SIGNIFICANT CHANGE UP (ref 80–100)
METHADONE UR-MCNC: NEGATIVE — SIGNIFICANT CHANGE UP
MONOCYTES # BLD AUTO: 0.59 K/UL — SIGNIFICANT CHANGE UP (ref 0–0.9)
MONOCYTES NFR BLD AUTO: 7.8 % — SIGNIFICANT CHANGE UP (ref 2–14)
NEUTROPHILS # BLD AUTO: 4.58 K/UL — SIGNIFICANT CHANGE UP (ref 1.8–7.4)
NEUTROPHILS NFR BLD AUTO: 60.7 % — SIGNIFICANT CHANGE UP (ref 43–77)
NITRITE UR-MCNC: NEGATIVE — SIGNIFICANT CHANGE UP
NRBC # BLD: 0 /100 WBCS — SIGNIFICANT CHANGE UP (ref 0–0)
OPIATES UR-MCNC: NEGATIVE — SIGNIFICANT CHANGE UP
PCP SPEC-MCNC: SIGNIFICANT CHANGE UP
PCP UR-MCNC: NEGATIVE — SIGNIFICANT CHANGE UP
PH UR: 5 — SIGNIFICANT CHANGE UP (ref 5–8)
PLATELET # BLD AUTO: 312 K/UL — SIGNIFICANT CHANGE UP (ref 150–400)
POTASSIUM SERPL-MCNC: 3.5 MMOL/L — SIGNIFICANT CHANGE UP (ref 3.5–5.3)
POTASSIUM SERPL-SCNC: 3.5 MMOL/L — SIGNIFICANT CHANGE UP (ref 3.5–5.3)
PROT SERPL-MCNC: 8.3 GM/DL — SIGNIFICANT CHANGE UP (ref 6–8.3)
PROT UR-MCNC: 100 MG/DL
RBC # BLD: 4.57 M/UL — SIGNIFICANT CHANGE UP (ref 3.8–5.2)
RBC # FLD: 14.6 % — HIGH (ref 10.3–14.5)
RBC CASTS # UR COMP ASSIST: SIGNIFICANT CHANGE UP /HPF (ref 0–4)
SALICYLATES SERPL-MCNC: <1.7 MG/DL (ref 2.8–20)
SARS-COV-2 RNA SPEC QL NAA+PROBE: SIGNIFICANT CHANGE UP
SODIUM SERPL-SCNC: 140 MMOL/L — SIGNIFICANT CHANGE UP (ref 135–145)
SP GR SPEC: 1.02 — SIGNIFICANT CHANGE UP (ref 1.01–1.02)
THC UR QL: NEGATIVE — SIGNIFICANT CHANGE UP
UROBILINOGEN FLD QL: NEGATIVE MG/DL — SIGNIFICANT CHANGE UP
VALPROATE SERPL-MCNC: <1 UG/ML (ref 50–100)
WBC # BLD: 7.55 K/UL — SIGNIFICANT CHANGE UP (ref 3.8–10.5)
WBC # FLD AUTO: 7.55 K/UL — SIGNIFICANT CHANGE UP (ref 3.8–10.5)
WBC UR QL: SIGNIFICANT CHANGE UP

## 2021-04-14 PROCEDURE — 90792 PSYCH DIAG EVAL W/MED SRVCS: CPT | Mod: 95

## 2021-04-14 PROCEDURE — 71045 X-RAY EXAM CHEST 1 VIEW: CPT | Mod: 26

## 2021-04-14 PROCEDURE — 70450 CT HEAD/BRAIN W/O DYE: CPT | Mod: 26,MA

## 2021-04-14 PROCEDURE — 99283 EMERGENCY DEPT VISIT LOW MDM: CPT

## 2021-04-14 PROCEDURE — 93010 ELECTROCARDIOGRAM REPORT: CPT

## 2021-04-14 RX ORDER — HALOPERIDOL DECANOATE 100 MG/ML
5 INJECTION INTRAMUSCULAR ONCE
Refills: 0 | Status: COMPLETED | OUTPATIENT
Start: 2021-04-14 | End: 2021-04-14

## 2021-04-14 RX ORDER — DIVALPROEX SODIUM 500 MG/1
250 TABLET, DELAYED RELEASE ORAL ONCE
Refills: 0 | Status: COMPLETED | OUTPATIENT
Start: 2021-04-14 | End: 2021-04-14

## 2021-04-14 RX ORDER — RISPERIDONE 4 MG/1
1 TABLET ORAL ONCE
Refills: 0 | Status: COMPLETED | OUTPATIENT
Start: 2021-04-14 | End: 2021-04-14

## 2021-04-14 RX ADMIN — Medication 2 MILLIGRAM(S): at 23:46

## 2021-04-14 RX ADMIN — DIVALPROEX SODIUM 250 MILLIGRAM(S): 500 TABLET, DELAYED RELEASE ORAL at 20:48

## 2021-04-14 RX ADMIN — HALOPERIDOL DECANOATE 5 MILLIGRAM(S): 100 INJECTION INTRAMUSCULAR at 23:46

## 2021-04-14 RX ADMIN — HALOPERIDOL DECANOATE 5 MILLIGRAM(S): 100 INJECTION INTRAMUSCULAR at 15:22

## 2021-04-14 RX ADMIN — RISPERIDONE 1 MILLIGRAM(S): 4 TABLET ORAL at 20:48

## 2021-04-14 NOTE — ED BEHAVIORAL HEALTH ASSESSMENT NOTE - VIOLENCE RISK FACTORS:
Feeling of being under threat and being unable to control threat/Affective dysregulation/Noncompliance with treatment

## 2021-04-14 NOTE — ED BEHAVIORAL HEALTH NOTE - BEHAVIORAL HEALTH NOTE
===================  PRE-HOSPITAL COURSE  ===================  SOURCE: ED Documentation    DETAILS: Pt was a walk-in      ============  ED COURSE   ============  SOURCE: ED Documentation, ED RN  ARRIVAL: Pt was a walk-in  BELONGINGS: ED RN reports belongings remained by patient   BEHAVIOR: Pt presented to ED with family reporting AMS and involuntary movement of right arm. Pt denied SI/HI. Pt has agreeable to hospital protocol including having labs drawn. Pt did not require involuntary medication or security intervention, but was given Haldol 5mg tablet, 1 tablet at ~15:22. Pt has been observed singing to herself.  TREATMENT: haloperidol 5 milliGRAM(s) Tablet, 1 Tablet(s) at ~15:22   VISITORS: None.        ========================  FOR EACH COLLATERAL  ========================  NAME: Brooklyn Lobato   NUMBER: [phone]  RELATIONSHIP: Sister  RELIABILITY: Able to provide some hx and presenting issues, however pt does not disclose extensive details about her mental and physical health     ========================  PATIENT DEMOGRAPHICS: Pt is a 54yo female, single, domiciled with Sister, employed as a home health aide, with non-minor children?  ========================    HPI  BASELINE FUNCTIONING: Pt is employed as a home health aide. Pt is private about her mental and physical health, and often tells Sister that it is “none of her business.” Pt lives with her Sister. Sister does not believe that pt has been taking medication.   DATE HPI STARTED: A few days ago  DECOMPENSATION: Sister does not think that pt has recently been in outpatient treatment. A few days ago pt received a court letter related to an ongoing legal issue related to pt’s house (Sister reports that pt had sought help from an individual in order to obtain a loan, but the individual fraudulently transferred the deed to pt’s home to a different name). For the past few days pt has had decreased sleep and appetite, with minimal food intake. Pt has been more withdrawn, with limited interactions with Sister which Sister describes as being unusual. Pt has appeared increasingly anxious and today pt was making odd arm gestures and making groaning and grumbling sounds. Sister states that when pt presents this way and is not eating or sleeping, she needs to be hospitalized psychiatrically. Today Sister asked pt if she should bring pt to the hospital, and pt agreed that she is not well and stated that she had to go to the hospital. Sister denies knowledge of AH/VH, denies substance use, SI, self injury, SA, recent aggression/violence, or access to guns/weapons. Sister states that pt should be hospitalized psychiatrically.   SUICIDALITY: None   VIOLENCE: None    SUBSTANCE: None    ?    ========================  PAST PSYCHIATRIC HISTORY  ========================  DATE PAST PSYCHIATRIC HISTORY STARTED: Many years ago   MAIN PSYCHIATRIC DIAGNOSIS: Depression  PSYCHIATRIC HOSPITALIZATIONS: Twice in the past year.   PRIOR ILLNESS: Sister describes pt as having similar episodes to today’s ED presentation in the past, most recently being hospitalized twice within the past year. At times in the past pt has become physically agitated when decompensated, including breaking things in the home and becoming physically combative when family attempted to facilitate mental health treatment.  SUICIDALITY: None   VIOLENCE: In the past pt has occasionally becomes physically combative with family and has broken things in the home when she has decompensated.   SUBSTANCE USE: None      ==============  OTHER HISTORY  ==============  CURRENT MEDICATION: Sister does not know if pt has been taking medication, but does not believe so   MEDICAL HISTORY: Per chart   ALLERGIES: Per chart, but Sister states that she is aware of pt having an Eggplant allergy  LEGAL ISSUES: No hx of arrest. Pt has a current court case with a court date in about 1 month related to someone allegedly   FIREARM ACCESS: None   SOCIAL HISTORY: Sister reports that pt had sought help from an individual in order to obtain a loan, but the individual fraudulently transferred the deed to pt’s home to a different name and pt has been dealing with the court case for awhile   FAMILY HISTORY: Sister- anxiety and depression; non-minor children- depression; no hx of suicidality in the family   DEVELOPMENTAL HISTORY: Normal         COVID Exposure Screen- collateral (i.e. third-party, chart review, belongings, etc; include EMS and ED staff)   1.	*Has the patient had a COVID-19 test in the last 90 days?  (  ) Yes   (  ) No   ( X ) Unknown- Reason: Sister is not aware of pt having been tested but can’t confirm  IF YES PROCEED TO QUESTION #2. IF NO OR UNKNOWN, PLEASE SKIP TO QUESTION #3.  2.	Date of test(s) and result(s): ________  3.	*Has the patient tested positive for COVID-19 antibodies? (  ) Yes   (  ) No   (X ) Unknown- Reason: Sister does not have full knowledge of pt’s medical hx   IF YES PROCEED TO QUESTION #4. IF NO or UNKNOWN, PLEASE SKIP TO QUESTION #5.  4.	Date of positive antibody test: ________  5.	*Has the patient received 2 doses of the COVID-19 vaccine? (  ) Yes   (  X) No   (  ) Unknown- Reason: _____  IF YES PROCEED TO QUESTION #6. IF NO or UNKNOWN, PLEASE SKIP TO QUESTION #7.  6.	 Date of second dose: ________  7.	*In the past 10 days, has the patient been around anyone with a positive COVID-19 test?* (  ) Yes   ( X ) No   (  ) Unknown- Reason: __  IF YES PROCEED TO QUESTION #8. IF NO or UNKNOWN, PLEASE SKIP TO QUESTION #13.  8.	Was the patient within 6 feet of them for at least 15 minutes? (  ) Yes   (  ) No   (  ) Unknown- Reason: _____  9.	Did the patient provide care for them? (  ) Yes   (  ) No   (  ) Unknown- Reason: ______  10.	Did the patient have direct physical contact with them (touched, hugged, or kissed them)? (  ) Yes   (  ) No    (  ) Unknown- Reason: __  11.	Did the patient share eating or drinking utensils with them? (  ) Yes   (  ) No    (  ) Unknown- Reason: ____  12.	Did they sneeze, cough, or somehow get respiratory droplets on the patient? (  ) Yes   (  ) No    (  ) Unknown- Reason: ______  13.	*Has the patient been out of [place] within the past 10 days?* (  ) Yes   ( X ) No   (  ) Unknown- Reason: _____  IF YES PLEASE ANSWER THE FOLLOWING QUESTIONS:  14.	Which state/country did they go to? ______  15.	Were they there over 24 hours? (  ) Yes   (  ) No    (  ) Unknown- Reason: ______  16.	Date of return to [facility]: ______    Collateral not able to provide any further details.

## 2021-04-14 NOTE — ED ADULT TRIAGE NOTE - CHIEF COMPLAINT QUOTE
per family pt having increase confusion with involuntary movement of right arm for a few days. Per sister pt have hx of severe depression and was given news she is loosing her house. Pt is able to answer some questions appropriately at triage

## 2021-04-14 NOTE — ED BEHAVIORAL HEALTH ASSESSMENT NOTE - OTHER
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

## 2021-04-14 NOTE — ED BEHAVIORAL HEALTH ASSESSMENT NOTE - RISK ASSESSMENT
Risk factors: static-- history of mental illness, modifiable-- safe environment, starting medications. Protective factors: supportive family, response to medications, recent decompensation (previousl worked at [employer]), no substance use, no known history of self injury or suicide attempt Low Acute Suicide Risk

## 2021-04-14 NOTE — ED BEHAVIORAL HEALTH ASSESSMENT NOTE - PSYCHIATRIC ISSUES AND PLAN (INCLUDE STANDING AND PRN MEDICATION)
Haldol 5/ Ativan 2 PO/IM PRN for agitation. Tonight, can start Depakote 250mg qHS and Risperdal 1mg qHS.

## 2021-04-14 NOTE — ED PROVIDER NOTE - CLINICAL SUMMARY MEDICAL DECISION MAKING FREE TEXT BOX
Pt evaluated for medical cause of delirium with normal CT, labs and urine. Discussed with tele-psych: pt has not been compliant with medications and has decompensated requiring in patient stabilization. Pt evaluated for medical cause of delirium with normal CT, labs and urine. Discussed with tele-psych: pt has not been compliant with medications and has decompensated requiring in patient stabilization. ekg with lateral TWI which have been rpesent on prior EKGs

## 2021-04-14 NOTE — ED PROVIDER NOTE - PROGRESS NOTE DETAILS
alexx resting comfortably in ED; risperidone and depakote ordered as per psych recs Patient signed out by Dr. Davila pending transfer to psych facility, bed search active.  Patient required medication for agitation.  Patient accepted to [facility].  Labs, CT imaging reviewed.

## 2021-04-14 NOTE — ED BEHAVIORAL HEALTH ASSESSMENT NOTE - HPI (INCLUDE ILLNESS QUALITY, SEVERITY, DURATION, TIMING, CONTEXT, MODIFYING FACTORS, ASSOCIATED SIGNS AND SYMPTOMS)
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     VPA level <1 in ER. Discussed possibility of delirium, however, UA was unremarkable for UTI (did have ketones), CXR was unremarkable, and HCT was normal. No concern for substances.     On interview patient was irritable, guarded, with +PMA (gesturing), loose associations, and disorganized thinking. She was AOx3, however, had trouble naming days of the week backwards. Instructions were given multiple times, and she started to count with her fingers, then asked me "left or right" or "right to left?" Did not seem to understand instructions. When asked what happened prior to the hospital, patient endorsed feeling upset at her sister. Stated that the past few days she has been more depressed, is not interested in work, and has not been sleeping well. Could not give me a number of hours that she is sleeping. She endorsed +AH but denied any command content and stated that it is to get rid of "badness" and "cleanliness." She denied feeling paranoid, however was very suspicious on exam and asked me multiple times why I am asking these questions. Also endorsed poor appetite but would not elaborate. She denied any thoughts of self harm or SI. She denied any HI. 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     VPA level <1 in ER. Discussed possibility of delirium, however, UA was unremarkable for UTI (did have ketones), CXR was unremarkable, and HCT was normal. No concern for substances.     On interview patient was irritable, guarded, with +PMA (gesturing), loose associations, and disorganized thinking. She was AOx3, however, had trouble naming days of the week backwards. Instructions were given multiple times, and she started to count with her fingers, then asked me "left or right" or "right to left?" Did not seem to understand instructions. When asked what happened prior to the hospital, patient endorsed feeling upset at her sister. Stated that the past few days she has been more depressed, is not interested in work, and has not been sleeping well. Could not give me a number of hours that she is sleeping. She endorsed +AH but denied any command content and stated that it is to get rid of "badness" and "cleanliness." She denied feeling paranoid, however was very suspicious on exam and asked me multiple times why I am asking these questions. Also endorsed poor appetite but would not elaborate. She denied any thoughts of self harm or SI. She denied any HI. In terms of medications, states she has a psychiatrist but "I'd have to check the bottle." States she has been off medications "because I finished it all."

## 2021-04-14 NOTE — ED BEHAVIORAL HEALTH ASSESSMENT NOTE - SUMMARY
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     Patient currently presents with irritability, disorganized thinking, guardedness, mood lability, and +AH. Also gesturing, likely internally preoccupied. Presentation is similar to that in 2017. Importantly, patient is having poor sleep and has not been eating (of note, urine has ketones). Sister expresses concerns. Has been off medications, is noncompliant. Will plan to admit involuntary 9.27 for psychiatric admission due to inability to care for herself based off presentation, past psych history and collateral information.

## 2021-04-14 NOTE — ED PROVIDER NOTE - OBJECTIVE STATEMENT
56 y/o F with PMHx of Bipolar disorder and Schizoaffective disorder (Depakote, Risperidone) presents to the ED with AMS for the last x3-4 days. As per her sister [place], pt has not been sleeping or eating, as well talking to herself and gesticulating with her hands. Denies any SI, HI, violence or no recent illness.

## 2021-04-14 NOTE — ED BEHAVIORAL HEALTH ASSESSMENT NOTE - DIFFERENTIAL
Unspecified Mood Disorder  Unspecified Psychotic Disorder  R/o Schizoaffective Disorder Bipolar Type, r/o Bipolar Disorder

## 2021-04-14 NOTE — ED ADULT NURSE NOTE - OBJECTIVE STATEMENT
A&Ox4 ambulating. pt presented to ED for AMS as per family. pt denies any pain or discomfort at this time. pt able to answer this writers questions appropriately and cooperative with staff.

## 2021-04-15 ENCOUNTER — INPATIENT (INPATIENT)
Facility: HOSPITAL | Age: 56
LOS: 6 days | Discharge: HOME | End: 2021-04-22
Attending: PSYCHIATRY & NEUROLOGY | Admitting: PSYCHIATRY & NEUROLOGY
Payer: MEDICAID

## 2021-04-15 VITALS — RESPIRATION RATE: 18 BRPM | OXYGEN SATURATION: 99 % | HEIGHT: 63 IN | WEIGHT: 169.98 LBS | TEMPERATURE: 98 F

## 2021-04-15 VITALS
RESPIRATION RATE: 15 BRPM | DIASTOLIC BLOOD PRESSURE: 66 MMHG | OXYGEN SATURATION: 96 % | SYSTOLIC BLOOD PRESSURE: 104 MMHG | HEART RATE: 77 BPM

## 2021-04-15 DIAGNOSIS — Z98.890 OTHER SPECIFIED POSTPROCEDURAL STATES: Chronic | ICD-10-CM

## 2021-04-15 DIAGNOSIS — F25.0 SCHIZOAFFECTIVE DISORDER, BIPOLAR TYPE: ICD-10-CM

## 2021-04-15 LAB
COVID-19 SPIKE DOMAIN AB INTERP: POSITIVE
COVID-19 SPIKE DOMAIN AB INTERP: POSITIVE
COVID-19 SPIKE DOMAIN ANTIBODY RESULT: 124 U/ML — HIGH
COVID-19 SPIKE DOMAIN ANTIBODY RESULT: 130 U/ML — HIGH
SARS-COV-2 IGG+IGM SERPL QL IA: 124 U/ML — HIGH
SARS-COV-2 IGG+IGM SERPL QL IA: 130 U/ML — HIGH
SARS-COV-2 IGG+IGM SERPL QL IA: POSITIVE
SARS-COV-2 IGG+IGM SERPL QL IA: POSITIVE
TSH SERPL-MCNC: 0.82 UIU/ML — SIGNIFICANT CHANGE UP (ref 0.27–4.2)

## 2021-04-15 PROCEDURE — 93010 ELECTROCARDIOGRAM REPORT: CPT

## 2021-04-15 PROCEDURE — 99222 1ST HOSP IP/OBS MODERATE 55: CPT

## 2021-04-15 RX ORDER — RISPERIDONE 4 MG/1
1 TABLET ORAL AT BEDTIME
Refills: 0 | Status: DISCONTINUED | OUTPATIENT
Start: 2021-04-15 | End: 2021-04-16

## 2021-04-15 RX ORDER — DIVALPROEX SODIUM 500 MG/1
500 TABLET, DELAYED RELEASE ORAL AT BEDTIME
Refills: 0 | Status: DISCONTINUED | OUTPATIENT
Start: 2021-04-15 | End: 2021-04-16

## 2021-04-15 RX ORDER — DIPHENHYDRAMINE HCL 50 MG
50 CAPSULE ORAL EVERY 6 HOURS
Refills: 0 | Status: DISCONTINUED | OUTPATIENT
Start: 2021-04-15 | End: 2021-04-22

## 2021-04-15 RX ORDER — HALOPERIDOL DECANOATE 100 MG/ML
5 INJECTION INTRAMUSCULAR ONCE
Refills: 0 | Status: DISCONTINUED | OUTPATIENT
Start: 2021-04-15 | End: 2021-04-22

## 2021-04-15 RX ORDER — HALOPERIDOL DECANOATE 100 MG/ML
5 INJECTION INTRAMUSCULAR EVERY 6 HOURS
Refills: 0 | Status: DISCONTINUED | OUTPATIENT
Start: 2021-04-15 | End: 2021-04-22

## 2021-04-15 RX ADMIN — RISPERIDONE 1 MILLIGRAM(S): 4 TABLET ORAL at 21:42

## 2021-04-15 RX ADMIN — DIVALPROEX SODIUM 500 MILLIGRAM(S): 500 TABLET, DELAYED RELEASE ORAL at 21:42

## 2021-04-15 NOTE — BH CHART NOTE - NSEVENTNOTEFT_PSY_ALL_CORE
admission  Ms. Lobato is a 56yo F, domiciled with sister, employed previously as A, states she stopped working several days ago for unclear reasons, with PPHx of bipolar disorder, multiple psychiatric hospitalizations, [facility] in 2017 (discharged on risperidone 4qHS and VPA 1000mg qHS) and Feb 2020, no prior SI/SA, history of aggressive behavior (loud, breaking property), no legal history, with no significant reported PMHx, was BIB sister for disorganized thinking, guardedness, and increased aggression in the context of medication noncompliance and social stressors.     Upon arrival to [facility], pt was poorly cooperative. Noted she was tired and sleepy, also appeared still sedated from meds received in the ED. Pt denied any SII/HIIP. Denied any AVH, paranoia, IOR, manic symptoms. Rest of exam as per ED evaluation.        Per ED Behavioral Health Assessment Note 4/14/21: “VPA level <1 in ER. Discussed possibility of delirium, however, UA was unremarkable for UTI (did have ketones), CXR was unremarkable, and HCT was normal. No concern for substances.     On interview patient was irritable, guarded, with +PMA (gesturing), loose associations, and disorganized thinking. She was AOx3, however, had trouble naming days of the week backwards. Instructions were given multiple times, and she started to count with her fingers, then asked me "left or right" or "right to left?" Did not seem to understand instructions. When asked what happened prior to the hospital, patient endorsed feeling upset at her sister. Stated that the past few days she has been more depressed, is not interested in work, and has not been sleeping well. Could not give me a number of hours that she is sleeping. She endorsed +AH but denied any command content and stated that it is to get rid of "badness" and "cleanliness." She denied feeling paranoid, however was very suspicious on exam and asked me multiple times why I am asking these questions. Also endorsed poor appetite but would not elaborate. She denied any thoughts of self harm or SI. She denied any HI. In terms of medications, states she has a psychiatrist but "I'd have to check the bottle." States she has been off medications "because I finished it all."     Per ED Behavioral Note 4/12/21:” Sister does not think that pt has recently been in outpatient treatment. A few days ago pt received a court letter related to an ongoing legal issue related to pt’s house (Sister reports that pt had sought help from an individual in order to obtain a loan, but the individual fraudulently transferred the deed to pt’s home to a different name). For the past few days pt has had decreased sleep and appetite, with minimal food intake. Pt has been more withdrawn, with limited interactions with Sister which Sister describes as being unusual. Pt has appeared increasingly anxious and today pt was making odd arm gestures and making groaning and grumbling sounds. Sister states that when pt presents this way and is not eating or sleeping, she needs to be hospitalized psychiatrically. Today Sister asked pt if she should bring pt to the hospital, and pt agreed that she is not well and stated that she had to go to the hospital. Sister denies knowledge of AH/VH, denies substance use, SI, self injury, SA, recent aggression/violence, or access to guns/weapons. Sister states that pt should be hospitalized psychiatrically.”       Current meds: None. But pt received Depakote 250mg and Risperidone 1mg in the ED.     Medical issues: None reported. However, presented to ED with family reporting AMS and involuntary movement of right arm. Despite initial concerns for delirium while in the ED, no significant findings appreciated on labs/imaging.         Vital Signs Last 24 Hrs  T(C): 36.6 (15 Apr 2021 04:25), Max: 37.2 (14 Apr 2021 14:45)  T(F): 97.8 (15 Apr 2021 04:25), Max: 99 (14 Apr 2021 14:45)  HR: 77 (15 Apr 2021 03:30) (76 - 112)  BP: 104/66 (15 Apr 2021 03:30) (101/65 - 150/78)  BP(mean): --  RR: 18 (15 Apr 2021 04:25) (15 - 20)  SpO2: 99% (15 Apr 2021 04:25) (96% - 99%)        MSE:  Appearance: appears stated age, hospital gown, poor hygiene and grooming. Behavior: poorly cooperative, poor eye contact. Motor: no abnormal body movements were notd. Speech: normal rate, rhythm, and volume. Mood: "tired.” Affect: irritable, full range, labile, congruent to mood. TP: circumstantial. TC: Denies any AVH, paranoia, IOR. Exam limited d/t poor pt engagement and sedation. Denies any SIIP/HIIP. Cognition: grossly intact. Attention/Concentration: poor. Insight: poor. Judgment: poor. Impulse control- fair on evaluation, poor by history.         Risk Assessment: Patient is at low acute risk of suicide. Risk factors worsening psychosis, poor sleep, medication non-adherence, hx of multiple inpt admissions, hx of aggression, and social stressors. Protective factors include future orientated, sense of responsibility to self, supportive family, no access. Immediate risk may be minimized by inpatient admission to a safe environment with appropriate supervision and limited access to lethal means.        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    During interview, patient was poorly cooperative and declined to participate interview. Pt noted she was tired, and did appeared still sedated from meds received while in the ED. Pt with hx of multiple inpt admissions, now with psychotic decompensation in the setting of medication non-adherence and social stressors. Per pt’s sister, a person who was supposed to help the pt apply for a loan transferred the deed to pt’s home to a different name, which has been of significant stressor to pt. Given acute psychotic decompensation, poor ADLs at home, and inability to care for self, the pt requires inpatient admission for stabilization, medication management, and safety.    1) Legal: 9.27  2) Safety: Routine checks, pt denies any SIIP/HIIP  3) Psychiatric Meds:   [ ] Not currently on any meds at home, primary team to start med regimen***  [ ]PRNs: Haldol 5mg PO/IM, Ativan 2mg PO/IM, Benadryl 50mg PO/IM    4) Medical:  [ ] F/U with lipid panel, HbA1C, and TSH   5. Social: milieu therapy  6. Dispo: Collateral and dispo planning pending further medication optimization        Direct admission completed, legals signed and in chart.      Ms. 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 in 2017 (discharged on risperidone 4qHS and VPA 1000mg qHS) and Feb 2020, no prior SI/SA, history of aggressive behavior (loud, breaking property), no legal history, with no significant reported PMHx, was BIB sister for disorganized thinking, guardedness, and increased aggression in the context of medication noncompliance and social stressors.     Upon arrival to [facility], pt was poorly cooperative. Noted she was tired and sleepy, also appeared still sedated from meds received in the ED. Pt denied any SII/HIIP. Denied any AVH, paranoia, IOR, manic symptoms. Rest of exam as per ED evaluation.        Per ED Behavioral Health Assessment Note 4/14/21: “VPA level <1 in ER. Discussed possibility of delirium, however, UA was unremarkable for UTI (did have ketones), CXR was unremarkable, and HCT was normal. No concern for substances.     On interview patient was irritable, guarded, with +PMA (gesturing), loose associations, and disorganized thinking. She was AOx3, however, had trouble naming days of the week backwards. Instructions were given multiple times, and she started to count with her fingers, then asked me "left or right" or "right to left?" Did not seem to understand instructions. When asked what happened prior to the hospital, patient endorsed feeling upset at her sister. Stated that the past few days she has been more depressed, is not interested in work, and has not been sleeping well. Could not give me a number of hours that she is sleeping. She endorsed +AH but denied any command content and stated that it is to get rid of "badness" and "cleanliness." She denied feeling paranoid, however was very suspicious on exam and asked me multiple times why I am asking these questions. Also endorsed poor appetite but would not elaborate. She denied any thoughts of self harm or SI. She denied any HI. In terms of medications, states she has a psychiatrist but "I'd have to check the bottle." States she has been off medications "because I finished it all."     Per ED Behavioral Note 4/12/21:” Sister does not think that pt has recently been in outpatient treatment. A few days ago pt received a court letter related to an ongoing legal issue related to pt’s house (Sister reports that pt had sought help from an individual in order to obtain a loan, but the individual fraudulently transferred the deed to pt’s home to a different name). For the past few days pt has had decreased sleep and appetite, with minimal food intake. Pt has been more withdrawn, with limited interactions with Sister which Sister describes as being unusual. Pt has appeared increasingly anxious and today pt was making odd arm gestures and making groaning and grumbling sounds. Sister states that when pt presents this way and is not eating or sleeping, she needs to be hospitalized psychiatrically. Today Sister asked pt if she should bring pt to the hospital, and pt agreed that she is not well and stated that she had to go to the hospital. Sister denies knowledge of AH/VH, denies substance use, SI, self injury, SA, recent aggression/violence, or access to guns/weapons. Sister states that pt should be hospitalized psychiatrically.”       Current meds: None. But pt received Depakote 250mg and Risperidone 1mg in the ED.     Medical issues: None reported. However, presented to ED with family reporting AMS and involuntary movement of right arm. Despite initial concerns for delirium while in the ED, no significant findings appreciated on labs/imaging.         Vital Signs Last 24 Hrs  T(C): 36.6 (15 Apr 2021 04:25), Max: 37.2 (14 Apr 2021 14:45)  T(F): 97.8 (15 Apr 2021 04:25), Max: 99 (14 Apr 2021 14:45)  HR: 77 (15 Apr 2021 03:30) (76 - 112)  BP: 104/66 (15 Apr 2021 03:30) (101/65 - 150/78)  BP(mean): --  RR: 18 (15 Apr 2021 04:25) (15 - 20)  SpO2: 99% (15 Apr 2021 04:25) (96% - 99%)        MSE:  Appearance: appears stated age, hospital gown, poor hygiene and grooming. Behavior: poorly cooperative, poor eye contact. Motor: no abnormal body movements were notd. Speech: normal rate, rhythm, and volume. Mood: "tired.” Affect: irritable, full range, labile, congruent to mood. TP: circumstantial. TC: Denies any AVH, paranoia, IOR. Exam limited d/t poor pt engagement and sedation. Denies any SIIP/HIIP. Cognition: grossly intact. Attention/Concentration: poor. Insight: poor. Judgment: poor. Impulse control- fair on evaluation, poor by history.         Risk Assessment: Patient is at low acute risk of suicide. Risk factors worsening psychosis, poor sleep, medication non-adherence, hx of multiple inpt admissions, hx of aggression, and social stressors. Protective factors include future orientated, sense of responsibility to self, supportive family, no access. Immediate risk may be minimized by inpatient admission to a safe environment with appropriate supervision and limited access to lethal means.        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    During interview, patient was poorly cooperative and declined to participate interview. Pt noted she was tired, and did appeared still sedated from meds received while in the ED. Pt with psychotic decompensation in the setting of medication non-adherence and social stressors. Per pt’s sister, a person who was supposed to help the pt apply for a loan transferred the deed to pt’s home to a different name, which has been of significant stressor to pt. Given acute psychotic decompensation, poor ADLs at home, and inability to care for self, the pt requires inpatient admission for stabilization, medication management, and safety.    1) Legal: 9.27  2) Safety: Routine checks, pt denies any SIIP/HIIP  3) Psychiatric Meds:   [ ] Not currently on any meds at home, primary team to start med regimen***  [ ]PRNs: Haldol 5mg PO/IM, Ativan 2mg PO/IM, Benadryl 50mg PO/IM    4) Medical:  [ ] F/U with lipid panel, HbA1C, and TSH   5. Social: milieu therapy  6. Dispo: Collateral and dispo planning pending further medication optimization        Direct admission completed, legals signed and in chart.

## 2021-04-15 NOTE — BH PATIENT PROFILE - NSICDXPASTMEDICALHX_GEN_ALL_CORE_FT
PAST MEDICAL HISTORY:  Bipolar 1 disorder     Bipolar illness     Psychiatric disorder on rispiradone

## 2021-04-15 NOTE — BH SOCIAL WORK INITIAL PSYCHOSOCIAL EVALUATION - NSBHFINANCE_PSY_ALL_CORE
Pt refused to answer- sleeping. According to EMR, Pt is loosing her house after being defrauded. Pt saw an individual in order to get a loan but the individual convinced her/tricked her into signing deed over to him/Unable to answer (specify)

## 2021-04-15 NOTE — BH INPATIENT PSYCHIATRY ASSESSMENT NOTE - NSBHCHARTREVIEWVS_PSY_A_CORE FT
Vital Signs Last 24 Hrs  T(C): 36.2 (15 Apr 2021 14:30), Max: 37.2 (14 Apr 2021 14:45)  T(F): 97.1 (15 Apr 2021 14:30), Max: 99 (14 Apr 2021 14:45)  HR: 77 (15 Apr 2021 03:30) (76 - 112)  BP: 104/66 (15 Apr 2021 03:30) (101/65 - 150/78)  BP(mean): --  RR: 18 (15 Apr 2021 04:25) (15 - 20)  SpO2: 99% (15 Apr 2021 04:25) (96% - 99%)

## 2021-04-15 NOTE — BH INPATIENT PSYCHIATRY ASSESSMENT NOTE - NSPRESENTSXS_PSY_ALL_CORE
Depressed mood/Anhedonia/Psychosis/Hopelessness or despair/Global insomnia/Refusal or inability to complete safety plan

## 2021-04-15 NOTE — BH INPATIENT PSYCHIATRY ASSESSMENT NOTE - NSBHASSESSSUMMFT_PSY_ALL_CORE
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     The patient is depressed with passive SI, +AVH and delusions in the setting of treatment non-adherence and psychosocial stressors. Risperdal and Depakote will be titrated as pt. has Hx of positive response on medications. Will coordinate housing/financial resources available to the patient with SW. The pt. is an acute elevated risk of harm to self due to passive SI, disorganization and overall decompensation in the setting of non-compliance and psychosocial stressors.    Plan:  Risperdal 1mg QHS; Depakote ER 500mg; melatonin 5mg  HA1c, lipid profile 4/15  Routine observations  Sx reduction, medication management, safety planning, milieu therapy.     Dispo: pending

## 2021-04-15 NOTE — BH SOCIAL WORK INITIAL PSYCHOSOCIAL EVALUATION - NSBHTREATHX_PSY_ALL_CORE
Pt was sleeping. According to EMR, Pt has outpatient providers but has not been compliant with medications for some time/Unable to answer (specify)

## 2021-04-15 NOTE — PSYCHIATRIC REHAB INITIAL EVALUATION - NSBHPRRECOMMEND_PSY_ALL_CORE
Dirkier met with patient in order to orient patient to unit and briefly introduce patient to psychiatric rehabilitation staff and department function. Patient was provided with a copy of unit schedule. Patient is an unreliable historian, as patient minimizes all symptoms leading up to admission and is preoccupied with discharge. According to chart, patient is admitted due to increased aggression and disorganized behavior in the context of medication non-compliance. Patient and writer established a collaborative psychiatric rehabilitation goal.  Writer encouraged patient to attend psychiatric rehabilitation groups and engage in treatment. Psychiatric rehabilitation staff will engage patient daily.

## 2021-04-15 NOTE — BH PATIENT PROFILE - STATED REASON FOR ADMISSION
brought in by sister for increased confusion, disorganized thinking. increased depression due to losing home

## 2021-04-15 NOTE — BH INPATIENT PSYCHIATRY ASSESSMENT NOTE - DESCRIPTION
currently living with sister, has 3 adult children, works as [employer]. She reports she is  for 3 months and her  is away at war

## 2021-04-15 NOTE — PSYCHIATRIC REHAB INITIAL EVALUATION - NSBHEMPRETURN_PSY_ALL_CORE
Patient gives conflicting background, as in ED she stated she quit her job a few days prior to admission, however during assessment she states she is employed./Other (specify)

## 2021-04-15 NOTE — BH INPATIENT PSYCHIATRY ASSESSMENT NOTE - NSCOMMENTSUICRISKFACT_PSY_ALL_CORE
Psychotic disorder, depression, pending loss, financial difficulty, treatment non-adherence, loneliness, multiple hospitalizations

## 2021-04-15 NOTE — BH SOCIAL WORK INITIAL PSYCHOSOCIAL EVALUATION - SAFE PLACE TO LIVE - DETAILS
Pt is currently living with her sister, however, she is loosing her home. According to EMR, Pt consulted with an individual regarding a loan but the individual tricked her into signing the deed over.

## 2021-04-15 NOTE — BH SOCIAL WORK INITIAL PSYCHOSOCIAL EVALUATION - NSBHEMPPOSITIONFT_PSY_ALL_CORE
Pt refused to answer- sleeping. 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

## 2021-04-15 NOTE — BH INPATIENT PSYCHIATRY ASSESSMENT NOTE - OTHER PAST PSYCHIATRIC HISTORY (INCLUDE DETAILS REGARDING ONSET, COURSE OF ILLNESS, INPATIENT/OUTPATIENT TREATMENT)
PPHx of bipolar disorder, multiple psychiatric hospitalizations, [facility] in 2017 (discharged on risperidone 4qHS and VPA 1000mg qHS) and Feb 2020, no prior SI/SA, history of aggressive behavior (loud, breaking property),

## 2021-04-15 NOTE — BH INPATIENT PSYCHIATRY ASSESSMENT NOTE - NSBHMETABOLIC_PSY_ALL_CORE_FT
BMI: BMI (kg/m2): 30.1 (04-15-21 @ 04:25)  HbA1c:   Glucose: POCT Blood Glucose.: 108 mg/dL (04-14-21 @ 14:37)    BP: --  Lipid Panel:

## 2021-04-15 NOTE — BH INPATIENT PSYCHIATRY ASSESSMENT NOTE - RISK ASSESSMENT
Risk factors: Psychotic disorder, depression, pending loss, financial difficulty, treatment non-adherence, loneliness, multiple hospitalizations.  Protective factors: supportive family, response to medications, recent decompensation (previously worked at [employer]), no substance use, no known history of self injury or suicide attempt.   Denies access to lethal means  The pt. is an acute elevated risk of harm to self due to passive SI, disorganization and overall decompensation in the setting of non-compliance and psychosocial stressors.

## 2021-04-15 NOTE — BH INPATIENT PSYCHIATRY ASSESSMENT NOTE - CURRENT MEDICATION
MEDICATIONS  (STANDING):    MEDICATIONS  (PRN):  diphenhydrAMINE 50 milliGRAM(s) Oral every 6 hours PRN anxiety/agitation  diphenhydrAMINE   Injectable 50 milliGRAM(s) IntraMuscular every 6 hours PRN severe anxiety/agitation  haloperidol     Tablet 5 milliGRAM(s) Oral every 6 hours PRN anxiety/agitation  haloperidol    Injectable 5 milliGRAM(s) IntraMuscular once PRN severe anxiety/agitation  LORazepam     Tablet 2 milliGRAM(s) Oral every 6 hours PRN anxiety/agitation  LORazepam   Injectable 2 milliGRAM(s) IntraMuscular once PRN severe anxiety/agitation

## 2021-04-15 NOTE — BH INPATIENT PSYCHIATRY ASSESSMENT NOTE - HPI (INCLUDE ILLNESS QUALITY, SEVERITY, DURATION, TIMING, CONTEXT, MODIFYING FACTORS, ASSOCIATED SIGNS AND SYMPTOMS)
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     VPA level <1 in ER. Discussed possibility of delirium, however, UA was unremarkable for UTI (did have ketones), CXR was unremarkable, and HCT was normal. No concern for substances.     On interview patient was irritable, guarded, with +PMA (gesturing), loose associations, and disorganized thinking. She was AOx3, however, had trouble naming days of the week backwards. Instructions were given multiple times, and she started to count with her fingers, then asked me "left or right" or "right to left?" Did not seem to understand instructions. When asked what happened prior to the hospital, patient endorsed feeling upset at her sister. Stated that the past few days she has been more depressed, is not interested in work, and has not been sleeping well. Could not give me a number of hours that she is sleeping. She endorsed +AH but denied any command content and stated that it is to get rid of "badness" and "cleanliness." She denied feeling paranoid, however was very suspicious on exam and asked me multiple times why I am asking these questions. Also endorsed poor appetite but would not elaborate. She denied any thoughts of self harm or SI. She denied any HI. In terms of medications, states she has a psychiatrist but "I'd have to check the bottle." States she has been off medications "because I finished it all."    4/15: On the unit, the patient reports "I feel tired. I need to rest." She stated that within the last 2 weeks, she has been experiencing poor sleep, poor appetite and dysphoria. She stated that she feels sad that her house is in [facility] and she is not sure where her and her sister are going to live. The pt. also stated "I don't go anywhere" and "I feel lonely." She also endorsed passive SI "often", but denies active SI intent or plan; also denies hx of SA or NSSIB. The pt. reports she will not kill herself because ti is God's will. The pt. endorses feeling anxious at times, ruminating about her love life and housing situation. She denied symptoms of laurent. The pt. endorsed occasional AH but refused to describe them. She also reported VH as "shadows in my room" that stand by her door.; states the AVH started a couple of weeks prior.    The pt. lives with her sister. 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 The pt. has 3 children and 2 grand children; denies she is close to her children. She reports she is  and that her  is not in [place]. The pt. stated she has not seen him in several years because "He is at war. He's an [employer] soldier." The pt. reports they have been  for 3 months. She denies illicit substance use; endorses drinking wine very rarely.  The pt. reports she ran out of her medications but was previously compliant.

## 2021-04-15 NOTE — BH PATIENT PROFILE - HOME MEDICATIONS
risperiDONE 4 mg oral tablet , 1 tab(s) orally once a day (at bedtime)   divalproex sodium 500 mg oral tablet, extended release , 2 tab(s) orally once a day (at bedtime)

## 2021-04-16 PROCEDURE — 99232 SBSQ HOSP IP/OBS MODERATE 35: CPT

## 2021-04-16 RX ORDER — RISPERIDONE 4 MG/1
2 TABLET ORAL AT BEDTIME
Refills: 0 | Status: DISCONTINUED | OUTPATIENT
Start: 2021-04-16 | End: 2021-04-19

## 2021-04-16 RX ORDER — DIVALPROEX SODIUM 500 MG/1
750 TABLET, DELAYED RELEASE ORAL AT BEDTIME
Refills: 0 | Status: DISCONTINUED | OUTPATIENT
Start: 2021-04-16 | End: 2021-04-19

## 2021-04-16 RX ADMIN — DIVALPROEX SODIUM 750 MILLIGRAM(S): 500 TABLET, DELAYED RELEASE ORAL at 20:06

## 2021-04-16 RX ADMIN — RISPERIDONE 2 MILLIGRAM(S): 4 TABLET ORAL at 20:14

## 2021-04-16 NOTE — BH INPATIENT PSYCHIATRY PROGRESS NOTE - NSBHASSESSSUMMFT_PSY_ALL_CORE
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     The patient is depressed with passive SI, +AVH and delusions in the setting of treatment non-adherence and psychosocial stressors. Risperdal and Depakote will be titrated as pt. has Hx of positive response on medications. Will coordinate housing/financial resources available to the patient with SW. The pt. is an acute elevated risk of harm to self due to passive SI, disorganization and overall decompensation in the setting of non-compliance and psychosocial stressors.    Plan:  Risperdal 1mg QHS; Depakote ER 500mg; melatonin 5mg  HA1c, lipid profile 4/15  Routine observations  Sx reduction, medication management, safety planning, milieu therapy.     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     The patient remains depressed, but is minimizing symptoms. She continues to endorse her  is in the ; will verify this with her sister. She refused to allow consent to speak with her children. Will coordinate housing/financial resources available to the patient with SW. Medication compliant. No SE noted.     Plan:  Risperdal 2mg QHS; Depakote ER 750mg  HA1c, lipid profile 4/19  Routine observations  Sx reduction, medication management, safety planning, milieu therapy.     Dispo: pending

## 2021-04-16 NOTE — BH INPATIENT PSYCHIATRY PROGRESS NOTE - NSBHFUPINTERVALHXFT_PSY_A_CORE
No acute events overnight. Patient has good insight. Understands he diagnosis and reports that sometimes she has days when she feels better than others. Reports there are times she works for 2-3 days, up to 24 hours per shift, but naps during shift. 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 She denies impulsivity, grandiosity, or feeling like she is full of energy without sleep. Reports "I need my sleep, and I can't function without it".   She is focused on discharge and says that her sister does not understand that sometimes it's ok not to feel good, or to feel tired; "this does not mean I automatically have to come to the hospital". She reports that she would like to establish outpatient care with a provider to facilitate getting her medications and to have check-ins. She denies suicidal or homocidal ideations. Reports she is going through "some things" but is "too personal to talk about" with anyone here.  Follow-up for schizoaffective disorder. Chart reviewed and discussed with team. No acute events overnight. The pt. reports sleeping well, normal appetite. Patient has good insight. Understands he diagnosis and reports that sometimes she has days when she feels better than others. Reports there are times she works for 2-3 days, up to 24 hours per shift, but naps during shift. 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 She denies impulsivity, grandiosity, or feeling like she is full of energy without sleep. Reports "I need my sleep, and I can't function without it".   She is focused on discharge and says that her sister does not understand that sometimes it's ok not to feel good, or to feel tired; "this does not mean I automatically have to come to the hospital". She reports that she would like to establish outpatient care with a provider to facilitate getting her medications and to have check-ins. She denies suicidal or homicidal ideations. Also denies AVH and delusions; denying she endorsed AVH yesterday. Reports she is going through "some things" but is "too personal to talk about" with anyone here.

## 2021-04-16 NOTE — BH INPATIENT PSYCHIATRY PROGRESS NOTE - NSBHCHARTREVIEWVS_PSY_A_CORE FT
Vital Signs Last 24 Hrs  T(C): 36.2 (16 Apr 2021 00:48), Max: 36.4 (15 Apr 2021 19:21)  T(F): 97.1 (16 Apr 2021 00:48), Max: 97.5 (15 Apr 2021 19:21)  HR: --  BP: --  BP(mean): --  RR: 17 (16 Apr 2021 07:51) (17 - 18)  SpO2: 99% (16 Apr 2021 07:51) (99% - 99%) Vital Signs Last 24 Hrs  T(C): 36.2 (16 Apr 2021 00:48), Max: 36.4 (15 Apr 2021 19:21)  T(F): 97.1 (16 Apr 2021 00:48), Max: 97.5 (15 Apr 2021 19:21)  HR: --104  BP: --104/61  BP(mean): --  RR: 17 (16 Apr 2021 07:51) (17 - 18)  SpO2: 99% (16 Apr 2021 07:51) (99% - 99%)

## 2021-04-17 PROCEDURE — 99231 SBSQ HOSP IP/OBS SF/LOW 25: CPT

## 2021-04-17 RX ADMIN — DIVALPROEX SODIUM 750 MILLIGRAM(S): 500 TABLET, DELAYED RELEASE ORAL at 20:36

## 2021-04-17 RX ADMIN — RISPERIDONE 2 MILLIGRAM(S): 4 TABLET ORAL at 20:36

## 2021-04-17 NOTE — BH INPATIENT PSYCHIATRY PROGRESS NOTE - NSBHASSESSSUMMFT_PSY_ALL_CORE
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   Continue as per primary team. Titrating up risperdal.     The patient remains depressed, but is minimizing symptoms. She continues to endorse her  is in the ; will verify this with her sister. She refused to allow consent to speak with her children. Will coordinate housing/financial resources available to the patient with SW. Medication compliant. No SE noted.     Plan:  Risperdal 2mg QHS; Depakote ER 750mg  HA1c, lipid profile 4/19  Routine observations  Sx reduction, medication management, safety planning, milieu therapy.     Dispo: pending

## 2021-04-17 NOTE — BH INPATIENT PSYCHIATRY PROGRESS NOTE - NSBHCHARTREVIEWVS_PSY_A_CORE FT
Vital Signs Last 24 Hrs  T(C): 36.2 (17 Apr 2021 14:35), Max: 36.8 (17 Apr 2021 06:03)  T(F): 97.2 (17 Apr 2021 14:35), Max: 98.2 (17 Apr 2021 06:03)  HR: --  BP: --  BP(mean): --  RR: 18 (17 Apr 2021 07:51) (18 - 18)  SpO2: 100% (17 Apr 2021 03:03) (100% - 100%)

## 2021-04-17 NOTE — BH INPATIENT PSYCHIATRY PROGRESS NOTE - NSBHFUPINTERVALHXFT_PSY_A_CORE
Follow-up for schizoaffective disorder. Chart reviewed and discussed with team. No acute events overnight. As per nursing staff, pt has been loud but in better behavioral control."  On interview, pt says that she slept well last night but her mood is "so so." She denies any problems with her meds. She's been in bed much of today and says that she'll feel better this way.

## 2021-04-18 PROCEDURE — 99231 SBSQ HOSP IP/OBS SF/LOW 25: CPT

## 2021-04-18 RX ADMIN — DIVALPROEX SODIUM 750 MILLIGRAM(S): 500 TABLET, DELAYED RELEASE ORAL at 20:16

## 2021-04-18 RX ADMIN — RISPERIDONE 2 MILLIGRAM(S): 4 TABLET ORAL at 20:17

## 2021-04-18 NOTE — BH INPATIENT PSYCHIATRY PROGRESS NOTE - NSBHFUPINTERVALHXFT_PSY_A_CORE
Follow-up for schizoaffective disorder. Chart reviewed and discussed with team. No acute events overnight.   On interview, pt says that she slept well last night and that her mood is "good." She denies any problems with her meds. She's been more visible on the unit today and says that she feels better than yesterday.

## 2021-04-18 NOTE — BH INPATIENT PSYCHIATRY PROGRESS NOTE - NSBHCHARTREVIEWVS_PSY_A_CORE FT
Vital Signs Last 24 Hrs  T(C): 36.5 (18 Apr 2021 21:01), Max: 36.5 (18 Apr 2021 21:01)  T(F): 97.7 (18 Apr 2021 21:01), Max: 97.7 (18 Apr 2021 21:01)  HR: --  BP: --  BP(mean): --  RR: 18 (18 Apr 2021 21:43) (17 - 18)  SpO2: 100% (18 Apr 2021 21:43) (100% - 100%)

## 2021-04-18 NOTE — BH INPATIENT PSYCHIATRY PROGRESS NOTE - NSBHASSESSSUMMFT_PSY_ALL_CORE
Continue as per primary team. Titrating up risperdal.     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     The patient remains depressed, but is minimizing symptoms. She continues to endorse her  is in the ; will verify this with her sister. She refused to allow consent to speak with her children. Will coordinate housing/financial resources available to the patient with SW. Medication compliant. No SE noted.     Plan:  Risperdal 2mg QHS; Depakote ER 750mg  HA1c, lipid profile 4/19  Routine observations  Sx reduction, medication management, safety planning, milieu therapy.     Dispo: pending

## 2021-04-19 LAB — GLUCOSE BLDC GLUCOMTR-MCNC: 126 MG/DL — HIGH (ref 70–99)

## 2021-04-19 PROCEDURE — 99231 SBSQ HOSP IP/OBS SF/LOW 25: CPT

## 2021-04-19 RX ORDER — RISPERIDONE 4 MG/1
3 TABLET ORAL AT BEDTIME
Refills: 0 | Status: DISCONTINUED | OUTPATIENT
Start: 2021-04-19 | End: 2021-04-22

## 2021-04-19 RX ORDER — DIVALPROEX SODIUM 500 MG/1
1000 TABLET, DELAYED RELEASE ORAL AT BEDTIME
Refills: 0 | Status: DISCONTINUED | OUTPATIENT
Start: 2021-04-19 | End: 2021-04-20

## 2021-04-19 RX ADMIN — RISPERIDONE 3 MILLIGRAM(S): 4 TABLET ORAL at 20:45

## 2021-04-19 RX ADMIN — DIVALPROEX SODIUM 1000 MILLIGRAM(S): 500 TABLET, DELAYED RELEASE ORAL at 20:44

## 2021-04-19 NOTE — BH INPATIENT PSYCHIATRY PROGRESS NOTE - NSBHASSESSSUMMFT_PSY_ALL_CORE
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     The patient's mood has improved; per nursing the patient can be intrusive at times but is generally in good behavioral control. No evidence of over psychotic symptoms, however, remains oddly related and bizarre at times. She is discharge focused. Denies SI/i/p. Continue titration of Risperdal and Depakote ER. Plan for D/C Thursday. Check HA1c, lipid profile, CMP, U/A tomorrow morning before breakfast as pt. reports polyuria and Hx of DM.     Plan:  Risperdal 3mg QHS; Depakote ER 1000mg  HA1c, lipid profile, CMP, U/A 4/20  Routine observations  Sx reduction, medication management, safety planning, milieu therapy.     Dispo: plan for [facility]

## 2021-04-19 NOTE — BH INPATIENT PSYCHIATRY PROGRESS NOTE - NSBHFUPINTERVALHXFT_PSY_A_CORE
Follow-up for schizoaffective disorder. Chart reviewed and discussed with team. No acute events overnight. The pt. slept well overnight, appetite is normal. She reports having a good weekend, states she played games and socialized with the patients. The pt. stated she has been urinating frequently; she endorsed a Hx of diabetes; denies that she is on anti-hyperglycemics including insulin. The pt. described her mood as "good". She is discharge focused, concerned about returning to work. The patient requested to follow-up outpatient at [facility] for continuity of care. Medication compliant. No SE noted. HA1c, lipid panel, CMP and urinalysis ordered for the morning.

## 2021-04-20 LAB
A1C WITH ESTIMATED AVERAGE GLUCOSE RESULT: 6.1 % — HIGH (ref 4–5.6)
ALBUMIN SERPL ELPH-MCNC: 4.1 G/DL — SIGNIFICANT CHANGE UP (ref 3.3–5)
ALP SERPL-CCNC: 57 U/L — SIGNIFICANT CHANGE UP (ref 40–120)
ALT FLD-CCNC: 20 U/L — SIGNIFICANT CHANGE UP (ref 4–33)
ANION GAP SERPL CALC-SCNC: 12 MMOL/L — SIGNIFICANT CHANGE UP (ref 7–14)
AST SERPL-CCNC: 24 U/L — SIGNIFICANT CHANGE UP (ref 4–32)
BILIRUB SERPL-MCNC: 0.3 MG/DL — SIGNIFICANT CHANGE UP (ref 0.2–1.2)
BUN SERPL-MCNC: 14 MG/DL — SIGNIFICANT CHANGE UP (ref 7–23)
CALCIUM SERPL-MCNC: 9.3 MG/DL — SIGNIFICANT CHANGE UP (ref 8.4–10.5)
CHLORIDE SERPL-SCNC: 105 MMOL/L — SIGNIFICANT CHANGE UP (ref 98–107)
CHOLEST SERPL-MCNC: 200 MG/DL — HIGH
CO2 SERPL-SCNC: 24 MMOL/L — SIGNIFICANT CHANGE UP (ref 22–31)
CREAT SERPL-MCNC: 0.74 MG/DL — SIGNIFICANT CHANGE UP (ref 0.5–1.3)
ESTIMATED AVERAGE GLUCOSE: 128 MG/DL — HIGH (ref 68–114)
GLUCOSE BLDC GLUCOMTR-MCNC: 111 MG/DL — HIGH (ref 70–99)
GLUCOSE BLDC GLUCOMTR-MCNC: 82 MG/DL — SIGNIFICANT CHANGE UP (ref 70–99)
GLUCOSE BLDC GLUCOMTR-MCNC: 93 MG/DL — SIGNIFICANT CHANGE UP (ref 70–99)
GLUCOSE BLDC GLUCOMTR-MCNC: 97 MG/DL — SIGNIFICANT CHANGE UP (ref 70–99)
GLUCOSE SERPL-MCNC: 108 MG/DL — HIGH (ref 70–99)
HDLC SERPL-MCNC: 41 MG/DL — LOW
LIPID PNL WITH DIRECT LDL SERPL: 132 MG/DL — HIGH
NON HDL CHOLESTEROL: 159 MG/DL — HIGH
POTASSIUM SERPL-MCNC: 3.8 MMOL/L — SIGNIFICANT CHANGE UP (ref 3.5–5.3)
POTASSIUM SERPL-SCNC: 3.8 MMOL/L — SIGNIFICANT CHANGE UP (ref 3.5–5.3)
PROT SERPL-MCNC: 7.8 G/DL — SIGNIFICANT CHANGE UP (ref 6–8.3)
SODIUM SERPL-SCNC: 141 MMOL/L — SIGNIFICANT CHANGE UP (ref 135–145)
TRIGL SERPL-MCNC: 133 MG/DL — SIGNIFICANT CHANGE UP

## 2021-04-20 PROCEDURE — 99231 SBSQ HOSP IP/OBS SF/LOW 25: CPT

## 2021-04-20 RX ORDER — DIVALPROEX SODIUM 500 MG/1
1250 TABLET, DELAYED RELEASE ORAL AT BEDTIME
Refills: 0 | Status: DISCONTINUED | OUTPATIENT
Start: 2021-04-20 | End: 2021-04-21

## 2021-04-20 RX ADMIN — DIVALPROEX SODIUM 1250 MILLIGRAM(S): 500 TABLET, DELAYED RELEASE ORAL at 20:10

## 2021-04-20 RX ADMIN — RISPERIDONE 3 MILLIGRAM(S): 4 TABLET ORAL at 20:10

## 2021-04-20 NOTE — BH INPATIENT PSYCHIATRY PROGRESS NOTE - NSBHASSESSSUMMFT_PSY_ALL_CORE
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     The patient's mood has improved; per nursing the patient can be intrusive at times but is generally in good behavioral control. No evidence of over psychotic symptoms, however, remains oddly related and bizarre at times. She is discharge focused. Denies SI/i/p. Continue titration of Risperdal and Depakote ER. Plan for D/C Thursday or Friday pending levels. A1C resulted as 6.1, hypercholesteremia, hyperlipidemia. U/A came back negative for WBCs, but few bacteria.      Plan:  Risperdal 3mg QHS; Depakote ER 1000mg  Medicine consult for Diabetes, Cholesterol, and Lipid control.   Routine observations  Sx reduction, medication management, safety planning, milieu therapy.     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     The patient's mood has improved; per nursing the patient can be intrusive at times but is generally in good behavioral control. No evidence of over psychotic symptoms, however, remains oddly related and bizarre at times. She is discharge focused. Denies SI/i/p. Continue titration of Risperdal and Depakote ER. Plan for D/C Thursday or Friday pending levels. A1C resulted as 6.1, hypercholesteremia, hyperlipidemia. U/A came back negative for WBCs, but few bacteria.      Plan:  Risperdal 3mg QHS; Depakote ER 1250mg  Medicine consult for Diabetes, Cholesterol, and Lipid control.   Routine observations  Sx reduction, medication management, safety planning, milieu therapy.     Dispo: plan for [facility]

## 2021-04-20 NOTE — BH INPATIENT PSYCHIATRY PROGRESS NOTE - NSBHANTIPSYCHOTIC_PSY_ALL_CORE_FT
HA1c, lipid panel pending for Monday

## 2021-04-20 NOTE — BH INPATIENT PSYCHIATRY PROGRESS NOTE - NSBHCHARTREVIEWVS_PSY_A_CORE FT
Vital Signs Last 24 Hrs  T(C): 36.4 (19 Apr 2021 22:25), Max: 37 (19 Apr 2021 14:22)  T(F): 97.5 (19 Apr 2021 22:25), Max: 98.6 (19 Apr 2021 14:22)  HR: --  BP: --  BP(mean): --  RR: 16 (19 Apr 2021 21:27) (16 - 16)  SpO2: 99% (19 Apr 2021 21:27) (99% - 99%) Vital Signs Last 24 Hrs  T(C): 36.4 (19 Apr 2021 22:25), Max: 37 (19 Apr 2021 14:22)  T(F): 97.5 (19 Apr 2021 22:25), Max: 98.6 (19 Apr 2021 14:22)  HR: --85  BP: --109/59  BP(mean): --  RR: 16 (19 Apr 2021 21:27) (16 - 16)  SpO2: 99% (19 Apr 2021 21:27) (99% - 99%)

## 2021-04-20 NOTE — BH INPATIENT PSYCHIATRY PROGRESS NOTE - NSBHFUPINTERVALHXFT_PSY_A_CORE
Follow-up for schizoaffective disorder. Chart reviewed and discussed with team. No acute events overnight. Describes her mood as "good". She is discharge focused, concerned about returning to work. The patient requested to follow-up outpatient at [facility] for continuity of care. Medication compliant. No SE noted. A1C 6.1, hypercholesteremia, and hyperlipidemia. Patient did not comment on changes in urination today.  Follow-up for schizoaffective disorder. Chart reviewed and discussed with team. No acute events overnight. Describes her mood as "good". She is discharge focused, concerned about returning to work. The patient requested to follow-up outpatient at [facility] for continuity of care. Medication compliant. No SE noted. A1C 6.1, hypercholesteremia, and hyperlipidemia. Patient did not comment on changes in urination today. Pt. denied AVH and delusions, SI/HI intent or plan. Medication compliant. No SE noted.

## 2021-04-21 ENCOUNTER — OUTPATIENT (OUTPATIENT)
Dept: OUTPATIENT SERVICES | Facility: HOSPITAL | Age: 56
LOS: 1 days | Discharge: HOME | End: 2021-04-21
Payer: MEDICAID

## 2021-04-21 DIAGNOSIS — Z98.890 OTHER SPECIFIED POSTPROCEDURAL STATES: Chronic | ICD-10-CM

## 2021-04-21 LAB
GLUCOSE BLDC GLUCOMTR-MCNC: 112 MG/DL — HIGH (ref 70–99)
GLUCOSE BLDC GLUCOMTR-MCNC: 132 MG/DL — HIGH (ref 70–99)
GLUCOSE BLDC GLUCOMTR-MCNC: 84 MG/DL — SIGNIFICANT CHANGE UP (ref 70–99)
GLUCOSE BLDC GLUCOMTR-MCNC: 88 MG/DL — SIGNIFICANT CHANGE UP (ref 70–99)
VALPROATE SERPL-MCNC: 115.4 UG/ML — HIGH (ref 50–100)

## 2021-04-21 PROCEDURE — 99231 SBSQ HOSP IP/OBS SF/LOW 25: CPT

## 2021-04-21 PROCEDURE — 99233 SBSQ HOSP IP/OBS HIGH 50: CPT | Mod: GC

## 2021-04-21 RX ORDER — RISPERIDONE 4 MG/1
1 TABLET ORAL
Qty: 14 | Refills: 0
Start: 2021-04-21 | End: 2021-05-04

## 2021-04-21 RX ORDER — DIVALPROEX SODIUM 500 MG/1
2 TABLET, DELAYED RELEASE ORAL
Qty: 28 | Refills: 0
Start: 2021-04-21 | End: 2021-05-04

## 2021-04-21 RX ORDER — DIVALPROEX SODIUM 500 MG/1
1000 TABLET, DELAYED RELEASE ORAL AT BEDTIME
Refills: 0 | Status: DISCONTINUED | OUTPATIENT
Start: 2021-04-21 | End: 2021-04-22

## 2021-04-21 RX ORDER — METFORMIN HYDROCHLORIDE 850 MG/1
500 TABLET ORAL
Refills: 0 | Status: DISCONTINUED | OUTPATIENT
Start: 2021-04-21 | End: 2021-04-21

## 2021-04-21 RX ADMIN — DIVALPROEX SODIUM 1000 MILLIGRAM(S): 500 TABLET, DELAYED RELEASE ORAL at 20:17

## 2021-04-21 RX ADMIN — RISPERIDONE 3 MILLIGRAM(S): 4 TABLET ORAL at 20:17

## 2021-04-21 NOTE — BH TREATMENT PLAN - NSTXDCOPNOINTERSW_PSY_ALL_CORE
Writer assist with discharge planning and followup care. Provide psycheducation and encourage appropriate socialization on the unit.
Writer assist with discharge planning and followup care. Provide psycheducation and encourage appropriate socialization on the unit.

## 2021-04-21 NOTE — BH TREATMENT PLAN - NSTXDISORGGOAL_PSY_ALL_CORE
Will make at least 3 goal and reality oriented statements during therapy
Will demonstrate the ability to maintain reality orientation and communicate clearly with others during 2 conversations with staff daily

## 2021-04-21 NOTE — BH TREATMENT PLAN - NSTXDCOPNOGOAL_PSY_ALL_CORE
Will agree to participate in appropriate outpatient care
Will agree to participate in appropriate outpatient care

## 2021-04-21 NOTE — BH INPATIENT PSYCHIATRY PROGRESS NOTE - NSBHFUPINTERVALHXFT_PSY_A_CORE
Follow-up for schizoaffective disorder. Chart reviewed and discussed with team. No acute events overnight. Describes her mood as "ok". She is discharge focused, concerned about returning to work. The patient requested to follow-up outpatient at [facility] for continuity of care. Medication compliant. No SE noted. A1C 6.1, hypercholesteremia, and hyperlipidemia; medicine consult was obtained. Pt. denied AVH and delusions, SI/HI intent or plan. Medication compliant. No SE noted.  Follow-up for schizoaffective disorder. Chart reviewed and discussed with team. No acute events overnight. Describes her mood as "ok". She is discharge focused, concerned about returning to work. The patient requested to follow-up outpatient at [facility] for continuity of care. Medication compliant. No SE noted. Hospitalist determined a statin is not currently indicated for the patient and to initiate Metformin for pre-diabetes and SGA.  Pt. denied AVH and delusions, SI/HI intent or plan. Medication compliant. No SE noted.

## 2021-04-21 NOTE — BH INPATIENT PSYCHIATRY PROGRESS NOTE - NSBHASSESSSUMMFT_PSY_ALL_CORE
56yo F, domiciled with sister, employed previously as HHA, states she stopped working several days ago for unclear reasons, no known PMH, PPH of bipolar disorder, multiple psychiatric hospitalizations, [facility] in 2017 (discharged on risperidone 4qHS and VPA 1000mg qHS) and Feb 2020, no prior SI/SA, history of aggressive behavior (loud, breaking property), no legal history, who was brought in by sister for disorganized thinking, guardedness, increased aggression in the context of medication noncompliance.     No acute changes. She is discharge focused. Denies SI/i/p. Continue titration of Risperdal and Depakote ER. Plan for D/C Thursday or Friday pending levels. A1C resulted as 6.1, hypercholesteremia, hyperlipidemia. U/A came back negative for WBCs, but few bacteria.      Plan:  Risperdal 3mg QHS; Depakote ER 1250mg  Medicine consult for Diabetes, Cholesterol, and Lipid control.   Routine observations  Sx reduction, medication management, safety planning, milieu therapy.     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     No acute changes; pt. maintains improvement in mood, denies psychotic symptoms. She is discharge focused. VPA level elevated at 115.40; she is asymptomatic. Will taper to 1000mg. The pt. is still appropriate for discharge tomorrow with plan for repeat VPA level outpatient. Medication compliant. No SE. Diet changed to DASH/consistent carb for pre-diabetes, HLD. Will initiate Metformin 500mg BID.     Plan:  c/w Risperdal 3mg QHS; Depakote ER 1000mg  Metformin 500mg BID  Routine observations  Sx reduction, medication management, safety planning, milieu therapy.     Dispo: plan for [facility] A[facility]

## 2021-04-21 NOTE — BH TREATMENT PLAN - NSCMSPTSTRENGTHS_PSY_ALL_CORE
Physically healthy/Strong support system/Supportive family
Able to adapt/Physically healthy/Strong support system/Supportive family

## 2021-04-21 NOTE — BH INPATIENT PSYCHIATRY PROGRESS NOTE - NSBHCHARTREVIEWVS_PSY_A_CORE FT
Vital Signs Last 24 Hrs  T(C): 36.2 (21 Apr 2021 06:49), Max: 36.4 (20 Apr 2021 14:34)  T(F): 97.1 (21 Apr 2021 06:49), Max: 97.6 (20 Apr 2021 14:34)  HR: 81 (21 Apr 2021 08:21) (81 - 81)  BP: 118/55 (21 Apr 2021 08:21) (118/55 - 118/55)  BP(mean): --  RR: 16 (20 Apr 2021 21:00) (16 - 16)  SpO2: 99% (20 Apr 2021 21:00) (99% - 99%)

## 2021-04-21 NOTE — CONSULT NOTE ADULT - SUBJECTIVE AND OBJECTIVE BOX
HPI:   55F with no PMH, PPH Bipolar and Schizoaffective d/o, currently admitted at [facility] for psychiatric conditions. Medicine consulted for abnormal finding on laboratory value of elevated A1c and cholesterol.     PAST MEDICAL & SURGICAL HISTORY:  Psychiatric disorder  on rispiradone    Bipolar 1 disorder    Bipolar illness    H/O breast surgery        Review of Systems:   CONSTITUTIONAL: No fever, weight loss, or fatigue  EYES: No eye pain, visual disturbances, or discharge  ENMT:  No difficulty hearing, tinnitus, vertigo; No sinus or throat pain  NECK: No pain or stiffness  RESPIRATORY: No cough, wheezing, chills or hemoptysis; No shortness of breath  CARDIOVASCULAR: No chest pain, palpitations, dizziness, or leg swelling  GASTROINTESTINAL: No abdominal or epigastric pain. No nausea, vomiting, or hematemesis; No diarrhea or constipation. No melena or hematochezia.  GENITOURINARY: No dysuria, frequency, hematuria, or incontinence  NEUROLOGICAL: No headaches, memory loss, loss of strength, numbness, or tremors  SKIN: No itching, burning, rashes, or lesions   LYMPH NODES: No enlarged glands  ENDOCRINE: No heat or cold intolerance; No hair loss  MUSCULOSKELETAL: No joint pain or swelling; No muscle, back, or extremity pain  HEME/LYMPH: No easy bruising, or bleeding gums  ALLERY AND IMMUNOLOGIC: No hives or eczema    Allergies    ampicillin (Anaphylaxis)  ampicillin (Other (Mild))  ampicillin (Unknown)  eggplant causes rash (Rash)    Intolerances        Social History:     FAMILY HISTORY:  No pertinent family history in first degree relatives        MEDICATIONS  (STANDING):  diVALproex ER 1250 milliGRAM(s) Oral at bedtime  risperiDONE   Tablet 3 milliGRAM(s) Oral at bedtime    MEDICATIONS  (PRN):  diphenhydrAMINE 50 milliGRAM(s) Oral every 6 hours PRN anxiety/agitation  diphenhydrAMINE   Injectable 50 milliGRAM(s) IntraMuscular every 6 hours PRN severe anxiety/agitation  haloperidol     Tablet 5 milliGRAM(s) Oral every 6 hours PRN anxiety/agitation  haloperidol    Injectable 5 milliGRAM(s) IntraMuscular once PRN severe anxiety/agitation  LORazepam     Tablet 2 milliGRAM(s) Oral every 6 hours PRN anxiety/agitation  LORazepam   Injectable 2 milliGRAM(s) IntraMuscular once PRN severe anxiety/agitation      Vital Signs Last 24 Hrs  T(C): 36.2 (21 Apr 2021 06:49), Max: 36.4 (20 Apr 2021 14:34)  T(F): 97.1 (21 Apr 2021 06:49), Max: 97.6 (20 Apr 2021 14:34)  HR: 81 (21 Apr 2021 08:21) (81 - 81)  BP: 118/55 (21 Apr 2021 08:21) (118/55 - 118/55)  BP(mean): --  RR: 16 (20 Apr 2021 21:00) (16 - 16)  SpO2: 99% (20 Apr 2021 21:00) (99% - 99%)  CAPILLARY BLOOD GLUCOSE      POCT Blood Glucose.: 88 mg/dL (21 Apr 2021 07:45)  POCT Blood Glucose.: 111 mg/dL (20 Apr 2021 20:02)  POCT Blood Glucose.: 82 mg/dL (20 Apr 2021 16:21)  POCT Blood Glucose.: 93 mg/dL (20 Apr 2021 12:15)        PHYSICAL EXAM:  GENERAL: NAD, well-developed  HEAD:  Atraumatic, Normocephalic  EYES: EOMI, conjunctiva and sclera clear  NECK: Supple, No JVD  CHEST/LUNG: Clear to auscultation bilaterally; No wheeze  HEART: Regular rate and rhythm; No murmurs, rubs, or gallops  ABDOMEN: Soft, Nontender, Nondistended; Bowel sounds present  EXTREMITIES:  2+ Peripheral Pulses, No clubbing, cyanosis, or edema  NEUROLOGY: non-focal  SKIN: No rashes or lesions    LABS:    04-20    141  |  105  |  14  ----------------------------<  108<H>  3.8   |  24  |  0.74    Ca    9.3      20 Apr 2021 09:58    TPro  7.8  /  Alb  4.1  /  TBili  0.3  /  DBili  x   /  AST  24  /  ALT  20  /  AlkPhos  57  04-20              EKG(personally reviewed):    RADIOLOGY & ADDITIONAL TESTS:    Imaging Personally Reviewed:    Consultant(s) Notes Reviewed:      Care Discussed with Consultants/Other Providers:   HPI:   55F with no PMH, PPH Bipolar and Schizoaffective d/o, currently admitted at [facility] for psychiatric conditions. Medicine consulted for abnormal finding on laboratory value of elevated A1c and cholesterol. Patient states she is already notified of lab results by PCP and she is watching her diet and exercise. Patient is endorsing chest pain, localized around her left breast. pain is pressure like, non-radiating. denied associated SOB or heart palpitation. Patient is endorsing SOB after climbing up one flight the stair. no limitation when walking on flat surfaces. denied fever, chill. abd pain, back pain.     PAST MEDICAL & SURGICAL HISTORY:  Psychiatric disorder  on rispiradone    Bipolar 1 disorder    Bipolar illness    H/O breast surgery        Review of Systems:   CONSTITUTIONAL: No fever, weight loss, or fatigue  EYES: No eye pain, visual disturbances, or discharge  ENMT:  No difficulty hearing, tinnitus, vertigo; No sinus or throat pain  NECK: No pain or stiffness  RESPIRATORY: No cough, wheezing, chills or hemoptysis; +shortness of breath  CARDIOVASCULAR: +chest pain, palpitations, dizziness, or leg swelling  GASTROINTESTINAL: No abdominal or epigastric pain. No nausea, vomiting, or hematemesis; No diarrhea or constipation. No melena or hematochezia.  GENITOURINARY: +frequency; No dysuria, hematuria, or incontinence  NEUROLOGICAL: No headaches, memory loss, loss of strength, numbness, or tremors  SKIN: No itching, burning, rashes, or lesions   LYMPH NODES: No enlarged glands  ENDOCRINE: No heat or cold intolerance; No hair loss  MUSCULOSKELETAL: No joint pain or swelling; No muscle, back, or extremity pain  HEME/LYMPH: No easy bruising, or bleeding gums  ALLERY AND IMMUNOLOGIC: No hives or eczema    Allergies    ampicillin (Anaphylaxis)  ampicillin (Other (Mild))  ampicillin (Unknown)  eggplant causes rash (Rash)    Intolerances        Social History:     FAMILY HISTORY:  No pertinent family history in first degree relatives        MEDICATIONS  (STANDING):  diVALproex ER 1250 milliGRAM(s) Oral at bedtime  risperiDONE   Tablet 3 milliGRAM(s) Oral at bedtime    MEDICATIONS  (PRN):  diphenhydrAMINE 50 milliGRAM(s) Oral every 6 hours PRN anxiety/agitation  diphenhydrAMINE   Injectable 50 milliGRAM(s) IntraMuscular every 6 hours PRN severe anxiety/agitation  haloperidol     Tablet 5 milliGRAM(s) Oral every 6 hours PRN anxiety/agitation  haloperidol    Injectable 5 milliGRAM(s) IntraMuscular once PRN severe anxiety/agitation  LORazepam     Tablet 2 milliGRAM(s) Oral every 6 hours PRN anxiety/agitation  LORazepam   Injectable 2 milliGRAM(s) IntraMuscular once PRN severe anxiety/agitation      Vital Signs Last 24 Hrs  T(C): 36.2 (21 Apr 2021 06:49), Max: 36.4 (20 Apr 2021 14:34)  T(F): 97.1 (21 Apr 2021 06:49), Max: 97.6 (20 Apr 2021 14:34)  HR: 81 (21 Apr 2021 08:21) (81 - 81)  BP: 118/55 (21 Apr 2021 08:21) (118/55 - 118/55)  BP(mean): --  RR: 16 (20 Apr 2021 21:00) (16 - 16)  SpO2: 99% (20 Apr 2021 21:00) (99% - 99%)  CAPILLARY BLOOD GLUCOSE      POCT Blood Glucose.: 88 mg/dL (21 Apr 2021 07:45)  POCT Blood Glucose.: 111 mg/dL (20 Apr 2021 20:02)  POCT Blood Glucose.: 82 mg/dL (20 Apr 2021 16:21)  POCT Blood Glucose.: 93 mg/dL (20 Apr 2021 12:15)        PHYSICAL EXAM:  GENERAL: NAD, well-developed  HEAD:  Atraumatic, Normocephalic  EYES: EOMI, conjunctiva and sclera clear  NECK: Supple, No JVD  CHEST/LUNG: Clear to auscultation bilaterally; No wheeze  HEART: Regular rate and rhythm; No murmurs, rubs, or gallops  ABDOMEN: Soft, Nontender, Nondistended; Bowel sounds present  EXTREMITIES:  2+ Peripheral Pulses, No clubbing, cyanosis, or edema  NEUROLOGY: non-focal  SKIN: No rashes or lesions    LABS:    04-20    141  |  105  |  14  ----------------------------<  108<H>  3.8   |  24  |  0.74    Ca    9.3      20 Apr 2021 09:58    TPro  7.8  /  Alb  4.1  /  TBili  0.3  /  DBili  x   /  AST  24  /  ALT  20  /  AlkPhos  57  04-20              EKG(personally reviewed):    RADIOLOGY & ADDITIONAL TESTS:    Imaging Personally Reviewed:    Consultant(s) Notes Reviewed:      Care Discussed with Consultants/Other Providers:

## 2021-04-21 NOTE — CONSULT NOTE ADULT - ASSESSMENT
55F with no PMH, psychiatry team concern for A1c 6.1 and elevated cholesterol.     #Pre-diabetes  - no need for treatment at this time.   - please monitor daily AM FS, especially if planning to add additional atypical antipsychotics to patient's regimen as it can result in hyperglycemia.   - encourage dietary change and exercise.  - Carb control diet.    #Hyperlipidemia  - given patient's young age and lack of comorbidities, 10yr ASCVD risk is 2.8%.   - no need for treatment at this time  - encourage diet change and aerobic exercise.    #polyuria  - no other signs of systemic infection  - potentially due to psychotropic medications.  - check UA to rule-out UTI. will f/u result.     #psych d/o  - management per primary team. 55F with no PMH, psychiatry team concern for A1c 6.1 and elevated cholesterol. Patient has vague cardiopulmonary complaints. potential underline anginal symptoms.     #Chest pain and SOB with exertion.  - no active symptoms at this time.   - EKG with stable TWI in interior and v5-6, stable from 1 year prior.   - echo at the time,   #Pre-diabetes  - no need for treatment at this time.   - please monitor daily AM FS, especially if planning to add additional atypical antipsychotics to patient's regimen as it can result in hyperglycemia.   - encourage dietary change and exercise.  - Carb control diet.    #Hyperlipidemia  - given patient's young age and lack of comorbidities, 10yr ASCVD risk is 2.8%.   - no need for treatment at this time  - encourage diet change and aerobic exercise.    #polyuria  - no other signs of systemic infection  - potentially due to psychotropic medications.  - check UA to rule-out UTI. will f/u result.     #psych d/o  - management per primary team. 55F with no PMH, psychiatry team concern for A1c 6.1 and elevated cholesterol. Patient has vague cardiopulmonary complaints. potential underline anginal symptoms.     #Chest pain and SOB with exertion.  - no active symptoms at this time.   - EKG with stable TWI in interior and v5-6, stable from 1 year prior.   - echo from 2/2020 was hyperdynamic with concentric hypertrophy. no WMA. no significant valvular dz.   - recommend outpatient cardiology evaluation.     #Pre-diabetes  - no need for treatment at this time.   - please monitor daily AM FS, especially if planning to add additional atypical antipsychotics to patient's regimen as it can result in hyperglycemia.   - encourage dietary change and exercise.  - Carb control diet.    #Hyperlipidemia  - given patient's young age and lack of comorbidities, 10yr ASCVD risk is 2.8%.   - no need for treatment at this time  - encourage diet change and aerobic exercise.    #polyuria  - no other signs of systemic infection  - potentially due to psychotropic medications.  - check UA to rule-out UTI. will f/u result.     #psych d/o  - management per primary team.

## 2021-04-21 NOTE — BH TREATMENT PLAN - NSTXMEDICINTERPR_PSY_ALL_CORE
Pt's goal is to demonstrate medication compliance for seven consecutive days.
Pt's goal is to demonstrate medication compliance for seven consecutive days.

## 2021-04-22 VITALS — HEART RATE: 82 BPM | DIASTOLIC BLOOD PRESSURE: 43 MMHG | SYSTOLIC BLOOD PRESSURE: 123 MMHG

## 2021-04-22 LAB — GLUCOSE BLDC GLUCOMTR-MCNC: 89 MG/DL — SIGNIFICANT CHANGE UP (ref 70–99)

## 2021-04-22 PROCEDURE — 99238 HOSP IP/OBS DSCHRG MGMT 30/<: CPT

## 2021-04-22 NOTE — BH INPATIENT PSYCHIATRY PROGRESS NOTE - NSBHCHARTREVIEWVS_PSY_A_CORE FT
Vital Signs Last 24 Hrs  T(C): 36.3 (22 Apr 2021 06:27), Max: 36.3 (22 Apr 2021 06:27)  T(F): 97.3 (22 Apr 2021 06:27), Max: 97.3 (22 Apr 2021 06:27)  HR: 82 (22 Apr 2021 07:53) (82 - 82)  BP: 123/43 (22 Apr 2021 07:53) (123/43 - 123/43)  BP(mean): --  RR: --  SpO2: --

## 2021-04-22 NOTE — BH INPATIENT PSYCHIATRY PROGRESS NOTE - NSTXDISORGGOAL_PSY_ALL_CORE
Will demonstrate the ability to maintain reality orientation and communicate clearly with others during 2 conversations with staff daily
Will make at least 3 goal and reality oriented statements during therapy
Will make at least 3 goal and reality oriented statements during therapy
Will demonstrate the ability to maintain reality orientation and communicate clearly with others during 2 conversations with staff daily

## 2021-04-22 NOTE — BH INPATIENT PSYCHIATRY DISCHARGE NOTE - NSBHDCMEDICALFT_PSY_A_CORE
Pt. c/o polyuria: FS checked QAM and are WNL  HA1c 6.1 and lipid panel revealing HLD and hypercholesterolemia: pt. changed to DASH/consistent carb diet and advised to follow-up with PCP outpatient

## 2021-04-22 NOTE — BH INPATIENT PSYCHIATRY PROGRESS NOTE - NSTXMEDICGOAL_PSY_ALL_CORE
Take all medications as prescribed

## 2021-04-22 NOTE — BH INPATIENT PSYCHIATRY PROGRESS NOTE - NSTXPROBMEDIC_PSY_ALL_CORE
MEDICATION/TREATMENT NON-COMPLIANCE

## 2021-04-22 NOTE — BH INPATIENT PSYCHIATRY PROGRESS NOTE - CURRENT MEDICATION
MEDICATIONS  (STANDING):  diVALproex ER 1250 milliGRAM(s) Oral at bedtime  risperiDONE   Tablet 3 milliGRAM(s) Oral at bedtime    MEDICATIONS  (PRN):  diphenhydrAMINE 50 milliGRAM(s) Oral every 6 hours PRN anxiety/agitation  diphenhydrAMINE   Injectable 50 milliGRAM(s) IntraMuscular every 6 hours PRN severe anxiety/agitation  haloperidol     Tablet 5 milliGRAM(s) Oral every 6 hours PRN anxiety/agitation  haloperidol    Injectable 5 milliGRAM(s) IntraMuscular once PRN severe anxiety/agitation  LORazepam     Tablet 2 milliGRAM(s) Oral every 6 hours PRN anxiety/agitation  LORazepam   Injectable 2 milliGRAM(s) IntraMuscular once PRN severe anxiety/agitation  
MEDICATIONS  (STANDING):  diVALproex  milliGRAM(s) Oral at bedtime  risperiDONE   Tablet 2 milliGRAM(s) Oral at bedtime    MEDICATIONS  (PRN):  diphenhydrAMINE 50 milliGRAM(s) Oral every 6 hours PRN anxiety/agitation  diphenhydrAMINE   Injectable 50 milliGRAM(s) IntraMuscular every 6 hours PRN severe anxiety/agitation  haloperidol     Tablet 5 milliGRAM(s) Oral every 6 hours PRN anxiety/agitation  haloperidol    Injectable 5 milliGRAM(s) IntraMuscular once PRN severe anxiety/agitation  LORazepam     Tablet 2 milliGRAM(s) Oral every 6 hours PRN anxiety/agitation  LORazepam   Injectable 2 milliGRAM(s) IntraMuscular once PRN severe anxiety/agitation  
MEDICATIONS  (STANDING):  diVALproex  milliGRAM(s) Oral at bedtime  risperiDONE   Tablet 2 milliGRAM(s) Oral at bedtime    MEDICATIONS  (PRN):  diphenhydrAMINE 50 milliGRAM(s) Oral every 6 hours PRN anxiety/agitation  diphenhydrAMINE   Injectable 50 milliGRAM(s) IntraMuscular every 6 hours PRN severe anxiety/agitation  haloperidol     Tablet 5 milliGRAM(s) Oral every 6 hours PRN anxiety/agitation  haloperidol    Injectable 5 milliGRAM(s) IntraMuscular once PRN severe anxiety/agitation  LORazepam     Tablet 2 milliGRAM(s) Oral every 6 hours PRN anxiety/agitation  LORazepam   Injectable 2 milliGRAM(s) IntraMuscular once PRN severe anxiety/agitation  
MEDICATIONS  (STANDING):  diVALproex ER 1250 milliGRAM(s) Oral at bedtime  risperiDONE   Tablet 3 milliGRAM(s) Oral at bedtime    MEDICATIONS  (PRN):  diphenhydrAMINE 50 milliGRAM(s) Oral every 6 hours PRN anxiety/agitation  diphenhydrAMINE   Injectable 50 milliGRAM(s) IntraMuscular every 6 hours PRN severe anxiety/agitation  haloperidol     Tablet 5 milliGRAM(s) Oral every 6 hours PRN anxiety/agitation  haloperidol    Injectable 5 milliGRAM(s) IntraMuscular once PRN severe anxiety/agitation  LORazepam     Tablet 2 milliGRAM(s) Oral every 6 hours PRN anxiety/agitation  LORazepam   Injectable 2 milliGRAM(s) IntraMuscular once PRN severe anxiety/agitation  
MEDICATIONS  (STANDING):  diVALproex ER 1000 milliGRAM(s) Oral at bedtime  risperiDONE   Tablet 3 milliGRAM(s) Oral at bedtime    MEDICATIONS  (PRN):  diphenhydrAMINE 50 milliGRAM(s) Oral every 6 hours PRN anxiety/agitation  diphenhydrAMINE   Injectable 50 milliGRAM(s) IntraMuscular every 6 hours PRN severe anxiety/agitation  haloperidol     Tablet 5 milliGRAM(s) Oral every 6 hours PRN anxiety/agitation  haloperidol    Injectable 5 milliGRAM(s) IntraMuscular once PRN severe anxiety/agitation  LORazepam     Tablet 2 milliGRAM(s) Oral every 6 hours PRN anxiety/agitation  LORazepam   Injectable 2 milliGRAM(s) IntraMuscular once PRN severe anxiety/agitation  
MEDICATIONS  (STANDING):  diVALproex  milliGRAM(s) Oral at bedtime  risperiDONE   Tablet 1 milliGRAM(s) Oral at bedtime    MEDICATIONS  (PRN):  diphenhydrAMINE 50 milliGRAM(s) Oral every 6 hours PRN anxiety/agitation  diphenhydrAMINE   Injectable 50 milliGRAM(s) IntraMuscular every 6 hours PRN severe anxiety/agitation  haloperidol     Tablet 5 milliGRAM(s) Oral every 6 hours PRN anxiety/agitation  haloperidol    Injectable 5 milliGRAM(s) IntraMuscular once PRN severe anxiety/agitation  LORazepam     Tablet 2 milliGRAM(s) Oral every 6 hours PRN anxiety/agitation  LORazepam   Injectable 2 milliGRAM(s) IntraMuscular once PRN severe anxiety/agitation  
MEDICATIONS  (STANDING):  diVALproex ER 1000 milliGRAM(s) Oral at bedtime  risperiDONE   Tablet 3 milliGRAM(s) Oral at bedtime    MEDICATIONS  (PRN):  diphenhydrAMINE 50 milliGRAM(s) Oral every 6 hours PRN anxiety/agitation  diphenhydrAMINE   Injectable 50 milliGRAM(s) IntraMuscular every 6 hours PRN severe anxiety/agitation  haloperidol     Tablet 5 milliGRAM(s) Oral every 6 hours PRN anxiety/agitation  haloperidol    Injectable 5 milliGRAM(s) IntraMuscular once PRN severe anxiety/agitation  LORazepam     Tablet 2 milliGRAM(s) Oral every 6 hours PRN anxiety/agitation  LORazepam   Injectable 2 milliGRAM(s) IntraMuscular once PRN severe anxiety/agitation

## 2021-04-22 NOTE — BH INPATIENT PSYCHIATRY DISCHARGE NOTE - OTHER PAST PSYCHIATRIC HISTORY (INCLUDE DETAILS REGARDING ONSET, COURSE OF ILLNESS, INPATIENT/OUTPATIENT TREATMENT)
PPHx of bipolar disorder, multiple psychiatric hospitalizations, [facility] in 2017 (discharged on risperidone 4qHS and VPA 1000mg qHS) and Feb 2020, no prior SI/SA, history of aggressive behavior (loud, breaking property),

## 2021-04-22 NOTE — BH INPATIENT PSYCHIATRY DISCHARGE NOTE - NSBHMETABOLIC_PSY_ALL_CORE_FT
BMI: BMI (kg/m2): 30.1 (04-15-21 @ 04:25)  HbA1c: A1C with Estimated Average Glucose Result: 6.1 % (04-20-21 @ 09:58)    Glucose: POCT Blood Glucose.: 89 mg/dL (04-22-21 @ 07:43)    BP: 123/43 (04-22-21 @ 07:53) (118/55 - 123/43)  Lipid Panel: Date/Time: 04-20-21 @ 09:58  Cholesterol, Serum: 200  Direct LDL: --  HDL Cholesterol, Serum: 41  Total Cholesterol/HDL Ration Measurement: --  Triglycerides, Serum: 133

## 2021-04-22 NOTE — BH INPATIENT PSYCHIATRY DISCHARGE NOTE - HOSPITAL COURSE
to be completed Dates of Hospitalization: 4/15/21-4/22/21  Upon admission, the patient endorsed depression including dysphoria, poor appetite and sleep, and passive SI. She also endorsed anxiety and ruminations. The pt. stated exacerbation of symptoms are in the setting of medication non-compliance and the possibility she may lose her home due to foreclosure. She also endorsed seeing shadows in her room and intermittent AH; she denied they were commanding. The day after being admitted, the pt. denied all psychotic symptoms, stating she did not remember reporting them to the writer. The pt. stated that she was trying to “cope” with her depression and anxiety on her own, after running out of medications; she felt she was doing well until issues with her  her house arose. She was slightly irritable on the unit and discharge focused but was able to be re-directed with assurance and when given a discharge plan. The pt. was agreeable to restarting Risperdal, Depakote and returning to [facility]. She was visible on the unit, attended groups at time, socialized with patients. Her mood improved with continued adherence. She stated that she is going to get a  to help her with her housing situation and plans to return to work upon discharge. The pt. stated she understands the role treatment non-adherence played with her decompensation. On day of discharge the patient denied SI/HI i/p, AVH and delusions. She reported feeling “excited” to return home and committed to her follow-up appointment.  Patient was started on Risperdal and titrated to 4mg QHS, and Depakote ER titrated to 1250mg QHS. Depakote ER was tapered to 1000mg after elevated VPA level of 115.4. The patient was asymptomatic and still eligible for discharge with plan to repeat level outpatient. All medications given with good effect and tolerability. Symptoms gradually improved over the course of hospitalization. The patient was made aware of the risks and benefits of each medication and tolerated them well with no apparent side effects.   There were no behavioral problems on the unit.  Patient did not become agitated, did not require emergency intramuscular medications or seclusion/restraints, and did not self-harm on the unit. Patient remained actively engaged in treatment and participated in individual, group, and milieu therapy.  Patient got along appropriately with staff and peers.  Family is supportive and available.    Medically, during this hospitalization the pt. reported polyuria and Hx of diabetes. HA1c was elevated at 6.1; QAM FS were WNL. Lipid panel also revealed hypercholesterolemia and HLD. Medicine was consulted who recommended not to start anti-hyperglycemic or statin medications and to continue monitoring lab values and follow-up with outpatient PCP. Diet was changed to DASH and consistent carbohydrate while inpatient.   Suicidal and aggression risk assessments were performed on the day of discharge. The patient is at elevated chronic risk of self-harm due to an underlying mood/psychotic disorder. She is not actively suicidal or manic, making her appropriate for less restrictive treatment as an outpatient without need for continued inpatient hospitalization. Protective factors for suicide include future orientation, social support, dependents, engagement in work/school, good physical health. Risk factors include insomnia, psychosocial stressors, non-compliance, psychotic disorder, multiple hospitalizations.    Immediate risk was minimized by inpatient admission to a safe environment with appropriate supervision and limited access to lethal means. Future risk was minimized before discharge by treatment of anxiety/depressive symptoms, maximizing outpatient support, providing relevant patient education, discussing emergency procedures, and ensuring close follow-up. Patient denies all suicidal and aggressive/homicidal ideation, intent and plan, and, in view of demonstrated clinical improvement, is not judged to be an acute danger to self or others at this time. Although the patient remains at their chronic baseline risk of self-harm, such risk cannot be further ameliorated by continued inpatient treatment and the patient is therefore appropriate for discharge.     On the day of discharge, the patient’s mood and affect are normal/euthymic. Patient denies depressed mood and anxiety. Patient is not hopeless. Sleep and appetite are also normal (at baseline).  Pt’s motor performance and productivity of speech are WNL. Pt denies symptoms of psychosis including AH/VH with no apparent delusions (or is at baseline/not preoccupied with delusions).  Patient denies S/H/I/I/P.     Patient has made clinically meaningful progress during this hospitalization and has clearly benefited from medications and psychotherapy. On day of discharge, the patient has improved significantly and no longer requires inpatient treatment and care. Pt will be discharged and follow-up with outpatient care.      Patient was provided with extensive psychoeducation on treatment options and motivational counseling targeting healthy lifestyle. Patient was instructed on actions for crisis situations, understood and agreed to follow instructions for handling crisis, including coming to ER or calling 911 should the patient or their family feel that they are in danger of hurting self or others. Patient also was given Suicide Prevention Lifeline number 1-252.198.2017 and provided with instructions on its use.   Patient was advised to avoid driving until their reactions are not impaired by medications. Motivational counseling was provided. Family is supportive and was provided with similar safety plan instructions.     Patient will be discharged with the following DSM5 Diagnoses:    PRINCIPAL DISCHARGE DIAGNOSIS  Diagnosis: Schizoaffective disorder    Patient will be discharged on the following medications:  divalproex sodium 500 mg oral tablet, extended release: 2 tab(s) orally once a day (at bedtime)  risperiDONE 4 mg oral tablet: 1 tab(s) orally once a day (at bedtime)    PLAN: repeat VPA level after patient. Discuss Risperdal Consta to aid with compliance. Pt. initially refused.    Handoff emailed to Dr. Dunham and Anastacia Glynn at [facility]  [phone] including discussion of hospital course, treatment, and medications. The patient’s appointment is on 04/27/2021 at 09:00.  Inpatient Provider:  Stu Suazo, MAURILIO-BC  [phone]

## 2021-04-22 NOTE — BH INPATIENT PSYCHIATRY PROGRESS NOTE - NSTXDCOPNOGOAL_PSY_ALL_CORE
Will agree to participate in appropriate outpatient care

## 2021-04-22 NOTE — BH INPATIENT PSYCHIATRY PROGRESS NOTE - NSDCCRITERIA_PSY_ALL_CORE
Sx reduction/remission, reduced risk of harm

## 2021-04-22 NOTE — BH INPATIENT PSYCHIATRY PROGRESS NOTE - NSBHMETABOLIC_PSY_ALL_CORE_FT
BMI: BMI (kg/m2): 30.1 (04-15-21 @ 04:25)  HbA1c:   Glucose: POCT Blood Glucose.: 108 mg/dL (04-14-21 @ 14:37)    BP: --  Lipid Panel: 
BMI: BMI (kg/m2): 30.1 (04-15-21 @ 04:25)  HbA1c: A1C with Estimated Average Glucose Result: 6.1 % (04-20-21 @ 09:58)    Glucose: POCT Blood Glucose.: 89 mg/dL (04-22-21 @ 07:43)    BP: 123/43 (04-22-21 @ 07:53) (118/55 - 123/43)  Lipid Panel: Date/Time: 04-20-21 @ 09:58  Cholesterol, Serum: 200  Direct LDL: --  HDL Cholesterol, Serum: 41  Total Cholesterol/HDL Ration Measurement: --  Triglycerides, Serum: 133  
BMI: BMI (kg/m2): 30.1 (04-15-21 @ 04:25)  HbA1c:   Glucose: POCT Blood Glucose.: 108 mg/dL (04-14-21 @ 14:37)    BP: --  Lipid Panel: 
BMI: BMI (kg/m2): 30.1 (04-15-21 @ 04:25)  HbA1c:   Glucose: POCT Blood Glucose.: 108 mg/dL (04-14-21 @ 14:37)    BP: --  Lipid Panel: 
BMI: BMI (kg/m2): 30.1 (04-15-21 @ 04:25)  HbA1c: A1C with Estimated Average Glucose Result: 6.1 % (04-20-21 @ 09:58)    Glucose: POCT Blood Glucose.: 97 mg/dL (04-20-21 @ 07:35)    BP: --  Lipid Panel: Date/Time: 04-20-21 @ 09:58  Cholesterol, Serum: 200  Direct LDL: --  HDL Cholesterol, Serum: 41  Total Cholesterol/HDL Ration Measurement: --  Triglycerides, Serum: 133  
BMI: BMI (kg/m2): 30.1 (04-15-21 @ 04:25)  HbA1c: A1C with Estimated Average Glucose Result: 6.1 % (04-20-21 @ 09:58)    Glucose: POCT Blood Glucose.: 88 mg/dL (04-21-21 @ 07:45)    BP: 118/55 (04-21-21 @ 08:21) (118/55 - 118/55)  Lipid Panel: Date/Time: 04-20-21 @ 09:58  Cholesterol, Serum: 200  Direct LDL: --  HDL Cholesterol, Serum: 41  Total Cholesterol/HDL Ration Measurement: --  Triglycerides, Serum: 133  
BMI: BMI (kg/m2): 30.1 (04-15-21 @ 04:25)  HbA1c:   Glucose: POCT Blood Glucose.: 108 mg/dL (04-14-21 @ 14:37)    BP: --  Lipid Panel:

## 2021-04-22 NOTE — BH DISCHARGE NOTE NURSING/SOCIAL WORK/PSYCH REHAB - PATIENT PORTAL LINK FT
You can access the FollowMyHealth Patient Portal offered by [facility] by registering at the following website: [email or web address]. By joining [facility]’s FollowMyHealth portal, you will also be able to view your health information using other applications (apps) compatible with our system.

## 2021-04-22 NOTE — BH DISCHARGE NOTE NURSING/SOCIAL WORK/PSYCH REHAB - NSCDUDCCRISIS_PSY_A_CORE
[facility]  1 [phone] [facility]-WELL ([phone])  Text "WELL" to 29323  Website: www.eDealya/.[facility] 1 (775) 251-DIRG (7294) Website: www.safehorizon.org/.National Suicide Prevention Lifeline 6 (769) 651-4752/.  [facility]  1 (840) LIFENET (223-9752)/.  [facility]  [address] [place] [address] [phone]   Hours:  Monday through Friday from 9 AM to 3 PM/.  [employer] - [facility] Crisis Line  9 [phone], Option 1

## 2021-04-22 NOTE — BH INPATIENT PSYCHIATRY PROGRESS NOTE - GENERAL APPEARANCE
No deformities present

## 2021-04-22 NOTE — BH DISCHARGE NOTE NURSING/SOCIAL WORK/PSYCH REHAB - NSDCPRRECOMMEND_PSY_ALL_CORE
Psych rehab staff recommend patient follow up with [facility] outpatient dept,. with Anastacia Glynn and Dr. Dunham on 04/24/2021

## 2021-04-22 NOTE — BH INPATIENT PSYCHIATRY PROGRESS NOTE - NSBHINPTBILLING_PSY_ALL_CORE
05586 - Inpatient Low Complexity
43871 - Inpatient Low Complexity
60991 - Inpatient Moderate Complexity
97703 - Inpatient Low Complexity
01048 - Hospital Discharge Day Management; 30 min or less
23685 - Inpatient Low Complexity
29009 - Inpatient Low Complexity

## 2021-04-22 NOTE — BH INPATIENT PSYCHIATRY PROGRESS NOTE - NSBHMSEBEHAV_PSY_A_CORE
Cooperative/Other
Cooperative
Cooperative/Other

## 2021-04-22 NOTE — BH INPATIENT PSYCHIATRY PROGRESS NOTE - NSBHMSEMOVE_PSY_A_CORE
Axis degrees  -26 R Axis degrees  13 T Axis degrees  Normal sinus rhythm  Normal ECG  Confirmed by Colin MUNROE, [facility] ([id number]) on 7/13/2019 12:05:43 PM  
No abnormal movements

## 2021-04-22 NOTE — BH INPATIENT PSYCHIATRY DISCHARGE NOTE - DESCRIPTION
currently living with sister, has 3 adult children, works as [employer]. She reports she is  for 3 months and her  is away at war

## 2021-04-22 NOTE — BH INPATIENT PSYCHIATRY DISCHARGE NOTE - NSDCRECOMMEND_PSY_ALL_CORE
Special diet.../Recommended laboratory tests/other investigations following discharge.../Recommended medical follow-up following discharge...

## 2021-04-22 NOTE — BH INPATIENT PSYCHIATRY DISCHARGE NOTE - HPI (INCLUDE ILLNESS QUALITY, SEVERITY, DURATION, TIMING, CONTEXT, MODIFYING FACTORS, ASSOCIATED SIGNS AND SYMPTOMS)
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     VPA level <1 in ER. Discussed possibility of delirium, however, UA was unremarkable for UTI (did have ketones), CXR was unremarkable, and HCT was normal. No concern for substances.     On interview patient was irritable, guarded, with +PMA (gesturing), loose associations, and disorganized thinking. She was AOx3, however, had trouble naming days of the week backwards. Instructions were given multiple times, and she started to count with her fingers, then asked me "left or right" or "right to left?" Did not seem to understand instructions. When asked what happened prior to the hospital, patient endorsed feeling upset at her sister. Stated that the past few days she has been more depressed, is not interested in work, and has not been sleeping well. Could not give me a number of hours that she is sleeping. She endorsed +AH but denied any command content and stated that it is to get rid of "badness" and "cleanliness." She denied feeling paranoid, however was very suspicious on exam and asked me multiple times why I am asking these questions. Also endorsed poor appetite but would not elaborate. She denied any thoughts of self harm or SI. She denied any HI. In terms of medications, states she has a psychiatrist but "I'd have to check the bottle." States she has been off medications "because I finished it all."    4/15: On the unit, the patient reports "I feel tired. I need to rest." She stated that within the last 2 weeks, she has been experiencing poor sleep, poor appetite and dysphoria. She stated that she feels sad that her house is in [facility] and she is not sure where her and her sister are going to live. The pt. also stated "I don't go anywhere" and "I feel lonely." She also endorsed passive SI "often", but denies active SI intent or plan; also denies hx of SA or NSSIB. The pt. reports she will not kill herself because ti is God's will. The pt. endorses feeling anxious at times, ruminating about her love life and housing situation. She denied symptoms of laurent. The pt. endorsed occasional AH but refused to describe them. She also reported VH as "shadows in my room" that stand by her door.; states the AVH started a couple of weeks prior.    The pt. lives with her sister. 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 The pt. has 3 children and 2 grand children; denies she is close to her children. She reports she is  and that her  is not in [place]. The pt. stated she has not seen him in several years because "He is at war. He's an [place] soldier." The pt. reports they have been  for 3 months. She denies illicit substance use; endorses drinking wine very rarely.  The pt. reports she ran out of her medications but was previously compliant.

## 2021-04-22 NOTE — BH INPATIENT PSYCHIATRY DISCHARGE NOTE - NSBHSUICIDESTATUS_PSY_ALL_CORE
Pt. denies SI/i/p. Risk factors include Hx of SI, treatment non-adherence, financial hardship, pending loss of home, poor social support, psychotic disorder, multiple hospitalizations

## 2021-04-22 NOTE — BH INPATIENT PSYCHIATRY PROGRESS NOTE - NSBHFUPINTERVALCCFT_PSY_A_CORE
"I'm good."
"I'm so so."
"I want to leave" 
"I'm happy"
"I feel ok today" 
"I pee too much"
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

## 2021-04-22 NOTE — BH INPATIENT PSYCHIATRY DISCHARGE NOTE - NSDCCPCAREPLAN_GEN_ALL_CORE_FT
PRINCIPAL DISCHARGE DIAGNOSIS  Diagnosis: Schizoaffective disorder  Assessment and Plan of Treatment:       
Statement Selected

## 2021-04-22 NOTE — BH INPATIENT PSYCHIATRY PROGRESS NOTE - NSTXDEPRESGOAL_PSY_ALL_CORE
Will identify 2 coping skills that assist in improving mood

## 2021-04-22 NOTE — BH INPATIENT PSYCHIATRY PROGRESS NOTE - PRN MEDS
MEDICATIONS  (PRN):  diphenhydrAMINE 50 milliGRAM(s) Oral every 6 hours PRN anxiety/agitation  diphenhydrAMINE   Injectable 50 milliGRAM(s) IntraMuscular every 6 hours PRN severe anxiety/agitation  haloperidol     Tablet 5 milliGRAM(s) Oral every 6 hours PRN anxiety/agitation  haloperidol    Injectable 5 milliGRAM(s) IntraMuscular once PRN severe anxiety/agitation  LORazepam     Tablet 2 milliGRAM(s) Oral every 6 hours PRN anxiety/agitation  LORazepam   Injectable 2 milliGRAM(s) IntraMuscular once PRN severe anxiety/agitation  

## 2021-04-22 NOTE — BH INPATIENT PSYCHIATRY PROGRESS NOTE - NSBHATTESTSEENBY_PSY_A_CORE
attending Psychiatrist without NP/Trainee
NP without Attending Psychiatrist
attending Psychiatrist without NP/Trainee
attending Psychiatrist without NP/Trainee

## 2021-04-22 NOTE — BH INPATIENT PSYCHIATRY PROGRESS NOTE - NSTXPROBDCOPNO_PSY_ALL_CORE
DISCHARGE ISSUE - NON-ADHERENT WITH OUTPATIENT SERVICES

## 2021-04-22 NOTE — BH DISCHARGE NOTE NURSING/SOCIAL WORK/PSYCH REHAB - NSDCPRGOAL_PSY_ALL_CORE
0 Writer met with patient in order to discuss progress towards psych rehab goal during current hospitalization.  Patient has made fair progress, as patient has been compliant and verbalized intent to remain compliant post discharge. Writer provided support.     On the unit, patient was minimally visible, interacting with selected peers. Patient presents as less paranoid and irritable with writer, stating that she is looking forward to spending time with her sister. Writer provided support and encouraged patient to remain engaged in treatment. . Patient completed safety plan. Patient refused press ganey.     Patient did not attend any psychiatric rehabilitation groups during current hospitalization despite daily prompting and encouragement from staff.

## 2021-04-22 NOTE — BH INPATIENT PSYCHIATRY PROGRESS NOTE - NSBHFUPINTERVALHXFT_PSY_A_CORE
Follow-up for schizoaffective disorder. Chart reviewed and discussed with team. No acute events overnight. Pt. reports sleeping and eating well. She stated that she is excited to return home. She is agreeable to committing to outpatient treatment. She was also educated on elevated VPA level and subsequent dose reduction to 100mg, and to expect another  VPA level blood draw outpatient. She denies depression symptoms. Pt. also denied AVH and delusions, SI/HI intent or plan. Medication compliant. No SE noted.

## 2021-04-22 NOTE — BH INPATIENT PSYCHIATRY DISCHARGE NOTE - NSDCMRMEDTOKEN_GEN_ALL_CORE_FT
divalproex sodium 500 mg oral tablet, extended release: 2 tab(s) orally once a day (at bedtime)  risperiDONE 4 mg oral tablet: 1 tab(s) orally once a day (at bedtime)

## 2021-04-22 NOTE — BH INPATIENT PSYCHIATRY PROGRESS NOTE - NSBHASSESSSUMMFT_PSY_ALL_CORE
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     No acute changes; pt. maintains improvement in mood, denies psychotic symptoms. Medication compliant. No SE. Diet changed to DASH/consistent carb for pre-diabetes, HLD. Metformin discontinued as medicine reports not currently indicated; pt. instructed to visit PCP for further monitoring.     Plan:  c/w Risperdal 3mg QHS; Depakote ER 1000mg  D/C to  [facility] AOPD

## 2021-04-22 NOTE — BH INPATIENT PSYCHIATRY PROGRESS NOTE - NSBHCONSULTIPREASON_PSY_A_CORE
danger to self; mental illness expected to respond to inpatient care
danger to self; mental illness expected to respond to inpatient care
other reason
danger to self; mental illness expected to respond to inpatient care

## 2021-04-22 NOTE — BH INPATIENT PSYCHIATRY PROGRESS NOTE - NSTXPROBDISORG_PSY_ALL_CORE
DISORGANIZATION OF THOUGHT/BEHAVIOR

## 2021-04-27 PROCEDURE — ZZZZZ: CPT

## 2021-05-20 DIAGNOSIS — F25.9 SCHIZOAFFECTIVE DISORDER, UNSPECIFIED: ICD-10-CM

## 2021-05-27 PROCEDURE — ZZZZZ: CPT

## 2021-08-12 PROCEDURE — ZZZZZ: CPT

## 2021-08-17 NOTE — ED BEHAVIORAL HEALTH ASSESSMENT NOTE - DESCRIPTION
Angelica Cazares see above currently living with sister, has adult children, works as [employer] As per  collateral note.    COVID Exposure Screen- Patient  Have you had a COVID-19 test in the last 90 days? no  Have you tested positive for COVID-19 antibodies? no  Have you received 2 doses of the COVID-19 vaccine? no  In the past 10 days, have you been around anyone with a positive COVID-19 test? no  Have you been out of [place] within the past 10 days? no    PSYCKES: Has multiple diagnoses listed including Bipolar 1, Schizoaffective Disorder, Unspecified Bipolar, Unspecified Psychotic Disorder, MDD, Conduct Disorder. Patient has a history of suicidal ideation. In terms of medication, has been on risperidone, lorazepam, depakote, Invega Sustenna BROWER. Last followed at [facility] in 2020 for Bipolar Disorder. Patient has a history of numerous psych admissions, last at [facility] Sept 2020 for paranoid schizophrenia.

## 2021-10-21 PROCEDURE — ZZZZZ: CPT

## 2021-12-01 PROCEDURE — G9005: CPT

## 2022-08-02 PROCEDURE — 99214 OFFICE O/P EST MOD 30 MIN: CPT | Mod: 95

## 2022-08-24 NOTE — ED BEHAVIORAL HEALTH ASSESSMENT NOTE - NS ED [facility] MED ROS CONSTITUTIONAL SYMPTOMS
Outpatient Clinic Established Patient Note    Patient: Pierre Led  : 1942 (89 y.o.)  Date: 2022    CC: Routine office visit    HPI:      70 M with PMHx NICM, Afib, HTN/HLD presenting for routine follow up visit. Patient reports that he does have an occasional cough which is more prominent in the later part of the day and sometimes lying flat. He is however able to lie flat without shortness of breath and does not require being propped up on pillows to sleep. He has some associated rhinorrhea which responds to fluticasone. He is also experiencing some postnasal drip. Home Meds:  Prior to Visit Medications    Medication Sig Taking? Authorizing Provider   Handicap Placard MISC by Does not apply route 2022 - 2024 Yes Tobi Peterson MD   metoprolol succinate (TOPROL XL) 50 MG extended release tablet TAKE 1 TABLET BY MOUTH DAILY Yes Barbara Blankenship MD   allopurinol (ZYLOPRIM) 100 MG tablet TAKE 1 TABLET BY MOUTH DAILY Yes Noel Espinal DO   HYDROcodone-acetaminophen (NORCO) 5-325 MG per tablet Take 1 tablet by mouth every 6 hours as needed for Pain (max 3 per day) for up to 28 days. Yes Parvez Dias MD   gabapentin (NEURONTIN) 300 MG capsule Take 2 capsules by mouth nightly for 30 days.  Yes Parvez Dias MD   albuterol sulfate HFA (PROVENTIL;VENTOLIN;PROAIR) 108 (90 Base) MCG/ACT inhaler INHALE 2 PUFFS INTO THE LUNGS EVERY 6 HOURS AS NEEDED FOR WHEEZING Yes Thais Schwartz,    sodium chloride (ALTAMIST SPRAY) 0.65 % nasal spray 1 spray by Nasal route as needed for Congestion Yes Tammi Schilder, MD   rosuvastatin (CRESTOR) 20 MG tablet TAKE 1 TABLET BY MOUTH EVERY NIGHT Yes Richard Lynch MD   ELIQUIS 5 MG TABS tablet TAKE 1 TABLET BY MOUTH TWICE DAILY Yes ANNABELLE Juan - CNP   losartan (COZAAR) 50 MG tablet TAKE 2 TABLETS BY MOUTH DAILY Yes Abdelrahman Fine MD   diclofenac sodium (VOLTAREN) 1 % GEL Apply 4 g topically 4 times daily Yes Tammi Schilder, MD furosemide (LASIX) 20 MG tablet TAKE 1 TABLET BY MOUTH EVERY DAY AS NEEDED FOR INCREASING SHORTNESS OF BREATH OR 3 LB WEIGHT CHANGE  Patient taking differently: daily Yes Brad Lee DO   spironolactone (ALDACTONE) 25 MG tablet Take 1 tablet by mouth daily Yes ANNABELLE Liriano CNP   Multiple Vitamins-Minerals (THERAPEUTIC MULTIVITAMIN-MINERALS) tablet Take 1 tablet by mouth daily Yes Historical Provider, MD   Vitamin D (CHOLECALCIFEROL) 25 MCG (1000 UT) TABS tablet Take 1,000 Units by mouth daily Yes Historical Provider, MD   senna (SENOKOT) 8.6 MG tablet Take 1 tablet by mouth daily Yes Historical Provider, MD   fluticasone (FLONASE) 50 MCG/ACT nasal spray INSTILL 1 SPRAY INTO EACH NOSTRIL DAILY Yes Nishant Martinez MD   albuterol (PROVENTIL) (5 MG/ML) 0.5% nebulizer solution Take 1 mL by nebulization 4 times daily as needed for Wheezing  Patient not taking: Reported on 8/24/2022  Jose Francisco Bingham MD   empagliflozin (JARDIANCE) 10 MG tablet Take 1 tablet by mouth daily  Patient not taking: No sig reported  ANNABELLE Liriano CNP   amLODIPine (NORVASC) 5 MG tablet Take 1 tablet by mouth daily  Mega [address] MD   Blood Pressure Monitoring (BLOOD PRESSURE MONITOR/L CUFF) MISC 1 Device by Does not apply route daily  Demetrice Nunes DO       Allergies:    Bactrim and Hctz [hydrochlorothiazide]    Health Maintenance Due   Topic Date Due    Annual Wellness Visit (AWV)  Never done    Shingles vaccine (2 of 3) 01/12/2015    Lipids  01/13/2021    COVID-19 Vaccine (3 - Booster for Pfizer series) 11/02/2021    Depression Screen  05/11/2022    Prostate Specific Antigen (PSA) Screening or Monitoring  05/11/2022       Immunization History   Administered Date(s) Administered    COVID-19, PFIZER PURPLE top, DILUTE for use, (age 15 y+), 30mcg/0.3mL 05/12/2021, 06/02/2021    Influenza Whole 10/13/2011    Influenza, FLUBLOK, (age 25 y+), PF 10/14/2020    Influenza, High Dose (Fluzone 65 yrs and older) 10/15/2012, 11/23/2015, 11/14/2016, 10/09/2017, 10/12/2019    Influenza, Sonu Grider, 6 mo and older, IM, PF (Flulaval, Fluarix) 12/13/2018    Pneumococcal Conjugate 13-valent ([id number]) 09/20/2019    Pneumococcal Polysaccharide (Lvilmeiyb22) 11/28/2011    Tdap (Boostrix, Adacel) 02/02/2015    Zoster Live (Zostavax) 11/17/2014       Review of Systems   Constitutional:  Negative for chills and fever. HENT:  Positive for postnasal drip and rhinorrhea. Negative for sneezing and voice change. Respiratory:  Positive for cough. Negative for shortness of breath and wheezing. Cardiovascular:  Negative for chest pain, palpitations and leg swelling. Gastrointestinal:  Negative for abdominal distention, abdominal pain, constipation, diarrhea, nausea and vomiting. A 10-organ Review Of Systems was obtained and otherwise unremarkable except as per HPI. Data: Old records have been reviewed electronically. PHYSICAL EXAM:  /69 (Site: Left Upper Arm, Position: Sitting, Cuff Size: Medium Adult)   Pulse 57   Temp 96.9 °F (36.1 °C) (Temporal)   Resp 20   Wt 209 lb (94.8 kg)   SpO2 95%   BMI 31.78 kg/m²   Physical Exam  Constitutional:       General: He is not in acute distress. Appearance: Normal appearance. HENT:      Head: Normocephalic and atraumatic. Eyes:      Extraocular Movements: Extraocular movements intact. Cardiovascular:      Rate and Rhythm: Normal rate and regular rhythm. Pulses: Normal pulses. Heart sounds: Normal heart sounds. No murmur heard. Pulmonary:      Effort: Pulmonary effort is normal. No respiratory distress. Breath sounds: Normal breath sounds. Abdominal:      General: Abdomen is flat. Bowel sounds are normal. There is no distension. Palpations: Abdomen is soft. Musculoskeletal:      Cervical back: Normal range of motion. Right lower leg: No edema. Left lower leg: No edema. Neurological:      Mental Status: He is alert.        Assessment & Plan: 1. Non-ischemic cardiomyopathy ([facility]  Follows with cardiology.  -Patient advised to continue aspirin.  -Continue GDMT. -Weight well controlled. 2. Atrial fibrillation, unspecified type ([facility]  -Continue beta blocker  -Continue AC with eliquis    3. Chronic pain syndrome  Follow with outpatient pain clinic, Dr. Hugo Almaraz. Given that patient has limited mobility secondary to his pain as well as shortness of breath from cardiomyopathy, will additionally prescribe for handicap placard.  -Continue pain control regimen      Return in about 3 months (around 11/24/2022). Dispo: Pt has been staffed with Dr. Karla Bowman MD  _______________  Mike Yeung MD, 8/24/2022 3:57 PM   PGY-2    Addendum to Resident H& P/Progress note:  I have personally seen,examined and evaluated the patient.  I have reviewed the current history, physical findings, labs and assessment and plan and agree with note as documented by resident MD ( Saran Rizvi)      Je Calles MD, FACP No complaints

## 2022-10-18 NOTE — BH PATIENT PROFILE - NSDYSPHAGSECTTWO_PSY_ALL_CORE
Anesthesia Pre Eval Note    Anesthesia ROS/Med Hx    Overall Review:  EKG was reviewed     Anesthetic Complication History:  Patient does not have a history of anesthetic complications      Pulmonary Review:  Patient does not have a pulmonary history      Neuro/Psych Review:  Patient does not have a neuro/psych history       Cardiovascular Review:  Patient does not have a cardiovascular history   Exercise tolerance: good (>4 METS)    GI/HEPATIC/RENAL Review:  Patient does not have a GI/hepatic/renalhistory       End/Other Review:    Positive for obesity class I - 30.00 - 34.99  Additional Results:     ALLERGIES:   -- Penicillins -- HIVES    --  Augmentin       Last Labs        Component                Value               Date/Time                  WBC                      8.5                 10/20/2021 0914            RBC                      4.69                10/20/2021 0914            HGB                      12.1                10/20/2021 0914            HCT                      39.3                10/20/2021 0914            MCV                      83.8                10/20/2021 0914            MCH                      25.8 (L)            10/20/2021 0914            MCHC                     30.8 (L)            10/20/2021 0914            RDW-CV                   14.4                10/20/2021 0914            Sodium                   138                 10/07/2022 1447            Potassium                4.4                 10/07/2022 1447            Chloride                 102                 10/07/2022 1447            Carbon Dioxide           28                  10/07/2022 1447            Glucose                  85                  10/07/2022 1447            BUN                      8                   10/07/2022 1447            Creatinine               0.72                10/07/2022 1447            Glomerular Filtrati*     >90                 10/07/2022 1447            Calcium                  8.9                  10/07/2022 1447            PLT                      242                 10/20/2021 0914        Patient Vitals in the past 24 hrs:  10/19/22 1108, BP:120/69, Temp:36.3 °C (97.3 °F), Temp src:Temporal, Pulse:78, Resp:(!) 21, SpO2:97 %, Height:5' 7\" (1.702 m), Weight:90.4 kg (199 lb 3.2 oz)      Relevant Problems   No relevant active problems       Physical Exam     Airway   Mallampati: I  TM Distance: >3 FB  Neck ROM: Full  TMJ Mobility: Good    Cardiovascular  Cardiovascular exam normal    Head Assessment  Head assessment: Normocephalic and Atraumatic    General Assessment  General Assessment: Alert and oriented and No acute distress    Dental Exam  Dental exam normal    Pulmonary Exam  Pulmonary exam normal    Abdominal Exam    Patient Demonstrates:  Obese      Anesthesia Plan:  No phone call attempted due to:  ASA Status: 1  Anesthesia Type: General    Induction: Intravenous  Preferred Airway Type: LMA  Maintenance: Inhalational  Premedication: Oral and IV      Post-op Pain Management: Per Surgeon      Checklist  Reviewed: Lab Results, Nursing Notes, Consultations, NPO Status, Problem list, Medications, Beta Blocker Status, Patient Summary, Allergies, Past Med History and Pregnancy Test Results  Consent/Risks Discussed Statement:  The proposed anesthetic plan, including its risks and benefits, have been discussed with the Patient along with the risks and benefits of alternatives. Questions were encouraged and answered and the patient and/or representative understands and agrees to proceed.    I have discussed elements of the patient's history or examination, as noted above and/or as follows, that place the patient at higher risk of complications; BMI> 30 (obesity).    I discussed with the patient (and/or patient's legal representative) the risks and benefits of the proposed anesthesia plan, General, which may include services performed by other anesthesia providers.    Alternative anesthesia plans, if  available, were reviewed with the patient (and/or patient's legal representative). Discussion has been held with the patient (and/or patient's legal representative) regarding risks of anesthesia, which include Nausea, Vomiting, Dental Injury, Sore Throat, Allergic Reaction, Hypotension, Aspiration, Nerve Injury, allergic reaction, nausea, vomiting, sore throat, oral injury and dental injury and emergent situations that may require change in anesthesia plan.    The patient (and/or patient's legal representative) has indicated understanding, his/her questions have been answered, and he/she wishes to proceed with the planned anesthetic.    Comments  Plan Comments: Functional capacity > 4 METS.  Health status is optimized.  I discussed the risks and benefits of general anesthesia including but not limited to dental/oral damage, nausea and vomiting, hoarse voice, sore throat, and allergic reaction to anesthetic medications with the patient.  All questions pertaining to anesthetic plan answered.         N/A

## 2022-11-10 NOTE — ED PROVIDER NOTE - CCCP TRG CHIEF CMPLNT
chest discomfort Xolair Counseling:  Patient informed of potential adverse effects including but not limited to fever, muscle aches, rash and allergic reactions.  The patient verbalized understanding of the proper use and possible adverse effects of Xolair.  All of the patient's questions and concerns were addressed.

## 2023-01-01 NOTE — ED PROVIDER NOTE - NSFOLLOWUPINSTRUCTIONS_ED_ALL_ED_FT
Follow up with your primary care doctor within the next 24-48 hours and bring copy of your results.  Return to the Emergency Department for worsening or persistent symptoms or any other concerns incl. chest pain, shortness of breath, dizziness, inability to tolerate oral intake.   Advance activity as tolerated.  Continue all previously prescribed medications as directed. You can use motrin 600mg every 6-8 hours for pain or fever, and/or Tylenol 650 mg every 4 hours for pain/fever. + ice to area. 81

## 2023-01-22 ENCOUNTER — INPATIENT (INPATIENT)
Facility: HOSPITAL | Age: 58
LOS: 11 days | Discharge: HOME | End: 2023-02-03
Attending: PSYCHIATRY & NEUROLOGY | Admitting: PSYCHIATRY & NEUROLOGY
Payer: MEDICAID

## 2023-01-22 VITALS
OXYGEN SATURATION: 100 % | SYSTOLIC BLOOD PRESSURE: 141 MMHG | HEART RATE: 90 BPM | RESPIRATION RATE: 16 BRPM | DIASTOLIC BLOOD PRESSURE: 62 MMHG | TEMPERATURE: 98 F

## 2023-01-22 DIAGNOSIS — F31.9 BIPOLAR DISORDER, UNSPECIFIED: ICD-10-CM

## 2023-01-22 DIAGNOSIS — Z98.890 OTHER SPECIFIED POSTPROCEDURAL STATES: Chronic | ICD-10-CM

## 2023-01-22 LAB
ALBUMIN SERPL ELPH-MCNC: 4 G/DL — SIGNIFICANT CHANGE UP (ref 3.3–5)
ALP SERPL-CCNC: 53 U/L — SIGNIFICANT CHANGE UP (ref 40–120)
ALT FLD-CCNC: 29 U/L — SIGNIFICANT CHANGE UP (ref 4–33)
AMPHET UR-MCNC: NEGATIVE — SIGNIFICANT CHANGE UP
ANION GAP SERPL CALC-SCNC: 12 MMOL/L — SIGNIFICANT CHANGE UP (ref 7–14)
APAP SERPL-MCNC: <10 UG/ML — LOW (ref 15–25)
APPEARANCE UR: CLEAR — SIGNIFICANT CHANGE UP
AST SERPL-CCNC: 28 U/L — SIGNIFICANT CHANGE UP (ref 4–32)
B PERT DNA SPEC QL NAA+PROBE: SIGNIFICANT CHANGE UP
B PERT+PARAPERT DNA PNL SPEC NAA+PROBE: SIGNIFICANT CHANGE UP
BACTERIA # UR AUTO: NEGATIVE — SIGNIFICANT CHANGE UP
BARBITURATES UR SCN-MCNC: NEGATIVE — SIGNIFICANT CHANGE UP
BASOPHILS # BLD AUTO: 0.03 K/UL — SIGNIFICANT CHANGE UP (ref 0–0.2)
BASOPHILS NFR BLD AUTO: 0.5 % — SIGNIFICANT CHANGE UP (ref 0–2)
BENZODIAZ UR-MCNC: NEGATIVE — SIGNIFICANT CHANGE UP
BILIRUB SERPL-MCNC: <0.2 MG/DL — SIGNIFICANT CHANGE UP (ref 0.2–1.2)
BILIRUB UR-MCNC: NEGATIVE — SIGNIFICANT CHANGE UP
BORDETELLA PARAPERTUSSIS (RAPRVP): SIGNIFICANT CHANGE UP
BUN SERPL-MCNC: 12 MG/DL — SIGNIFICANT CHANGE UP (ref 7–23)
C PNEUM DNA SPEC QL NAA+PROBE: SIGNIFICANT CHANGE UP
CALCIUM SERPL-MCNC: 9.1 MG/DL — SIGNIFICANT CHANGE UP (ref 8.4–10.5)
CHLORIDE SERPL-SCNC: 107 MMOL/L — SIGNIFICANT CHANGE UP (ref 98–107)
CO2 SERPL-SCNC: 24 MMOL/L — SIGNIFICANT CHANGE UP (ref 22–31)
COCAINE METAB.OTHER UR-MCNC: NEGATIVE — SIGNIFICANT CHANGE UP
COLOR SPEC: SIGNIFICANT CHANGE UP
COVID-19 SPIKE DOMAIN AB INTERP: POSITIVE
COVID-19 SPIKE DOMAIN ANTIBODY RESULT: >250 U/ML — HIGH
CREAT SERPL-MCNC: 0.77 MG/DL — SIGNIFICANT CHANGE UP (ref 0.5–1.3)
CREATININE URINE RESULT, DAU: 108 MG/DL — SIGNIFICANT CHANGE UP
DIFF PNL FLD: ABNORMAL
EGFR: 91 ML/MIN/1.73M2 — SIGNIFICANT CHANGE UP
EOSINOPHIL # BLD AUTO: 0.1 K/UL — SIGNIFICANT CHANGE UP (ref 0–0.5)
EOSINOPHIL NFR BLD AUTO: 1.5 % — SIGNIFICANT CHANGE UP (ref 0–6)
EPI CELLS # UR: 2 /HPF — SIGNIFICANT CHANGE UP (ref 0–5)
ETHANOL SERPL-MCNC: <10 MG/DL — SIGNIFICANT CHANGE UP
FLUAV SUBTYP SPEC NAA+PROBE: SIGNIFICANT CHANGE UP
FLUBV RNA SPEC QL NAA+PROBE: SIGNIFICANT CHANGE UP
GLUCOSE SERPL-MCNC: 100 MG/DL — HIGH (ref 70–99)
GLUCOSE UR QL: NEGATIVE — SIGNIFICANT CHANGE UP
HADV DNA SPEC QL NAA+PROBE: SIGNIFICANT CHANGE UP
HCOV 229E RNA SPEC QL NAA+PROBE: SIGNIFICANT CHANGE UP
HCOV HKU1 RNA SPEC QL NAA+PROBE: SIGNIFICANT CHANGE UP
HCOV NL63 RNA SPEC QL NAA+PROBE: SIGNIFICANT CHANGE UP
HCOV OC43 RNA SPEC QL NAA+PROBE: SIGNIFICANT CHANGE UP
HCT VFR BLD CALC: 40.1 % — SIGNIFICANT CHANGE UP (ref 34.5–45)
HGB BLD-MCNC: 12.5 G/DL — SIGNIFICANT CHANGE UP (ref 11.5–15.5)
HMPV RNA SPEC QL NAA+PROBE: SIGNIFICANT CHANGE UP
HPIV1 RNA SPEC QL NAA+PROBE: SIGNIFICANT CHANGE UP
HPIV2 RNA SPEC QL NAA+PROBE: SIGNIFICANT CHANGE UP
HPIV3 RNA SPEC QL NAA+PROBE: SIGNIFICANT CHANGE UP
HPIV4 RNA SPEC QL NAA+PROBE: SIGNIFICANT CHANGE UP
HYALINE CASTS # UR AUTO: 0 /LPF — SIGNIFICANT CHANGE UP (ref 0–7)
IANC: 3.42 K/UL — SIGNIFICANT CHANGE UP (ref 1.8–7.4)
IMM GRANULOCYTES NFR BLD AUTO: 0.3 % — SIGNIFICANT CHANGE UP (ref 0–0.9)
KETONES UR-MCNC: NEGATIVE — SIGNIFICANT CHANGE UP
LEUKOCYTE ESTERASE UR-ACNC: NEGATIVE — SIGNIFICANT CHANGE UP
LYMPHOCYTES # BLD AUTO: 2.48 K/UL — SIGNIFICANT CHANGE UP (ref 1–3.3)
LYMPHOCYTES # BLD AUTO: 37.7 % — SIGNIFICANT CHANGE UP (ref 13–44)
M PNEUMO DNA SPEC QL NAA+PROBE: SIGNIFICANT CHANGE UP
MCHC RBC-ENTMCNC: 28 PG — SIGNIFICANT CHANGE UP (ref 27–34)
MCHC RBC-ENTMCNC: 31.2 GM/DL — LOW (ref 32–36)
MCV RBC AUTO: 89.9 FL — SIGNIFICANT CHANGE UP (ref 80–100)
METHADONE UR-MCNC: NEGATIVE — SIGNIFICANT CHANGE UP
MONOCYTES # BLD AUTO: 0.52 K/UL — SIGNIFICANT CHANGE UP (ref 0–0.9)
MONOCYTES NFR BLD AUTO: 7.9 % — SIGNIFICANT CHANGE UP (ref 2–14)
NEUTROPHILS # BLD AUTO: 3.42 K/UL — SIGNIFICANT CHANGE UP (ref 1.8–7.4)
NEUTROPHILS NFR BLD AUTO: 52.1 % — SIGNIFICANT CHANGE UP (ref 43–77)
NITRITE UR-MCNC: NEGATIVE — SIGNIFICANT CHANGE UP
NRBC # BLD: 0 /100 WBCS — SIGNIFICANT CHANGE UP (ref 0–0)
NRBC # FLD: 0 K/UL — SIGNIFICANT CHANGE UP (ref 0–0)
OPIATES UR-MCNC: NEGATIVE — SIGNIFICANT CHANGE UP
OXYCODONE UR-MCNC: NEGATIVE — SIGNIFICANT CHANGE UP
PCP SPEC-MCNC: SIGNIFICANT CHANGE UP
PCP UR-MCNC: NEGATIVE — SIGNIFICANT CHANGE UP
PH UR: 6.5 — SIGNIFICANT CHANGE UP (ref 5–8)
PLATELET # BLD AUTO: 266 K/UL — SIGNIFICANT CHANGE UP (ref 150–400)
POTASSIUM SERPL-MCNC: 4.3 MMOL/L — SIGNIFICANT CHANGE UP (ref 3.5–5.3)
POTASSIUM SERPL-SCNC: 4.3 MMOL/L — SIGNIFICANT CHANGE UP (ref 3.5–5.3)
PROT SERPL-MCNC: 7.2 G/DL — SIGNIFICANT CHANGE UP (ref 6–8.3)
PROT UR-MCNC: NEGATIVE — SIGNIFICANT CHANGE UP
RAPID RVP RESULT: SIGNIFICANT CHANGE UP
RBC # BLD: 4.46 M/UL — SIGNIFICANT CHANGE UP (ref 3.8–5.2)
RBC # FLD: 15 % — HIGH (ref 10.3–14.5)
RBC CASTS # UR COMP ASSIST: 14 /HPF — HIGH (ref 0–4)
RSV RNA SPEC QL NAA+PROBE: SIGNIFICANT CHANGE UP
RV+EV RNA SPEC QL NAA+PROBE: SIGNIFICANT CHANGE UP
SALICYLATES SERPL-MCNC: <0.3 MG/DL — LOW (ref 15–30)
SARS-COV-2 IGG+IGM SERPL QL IA: >250 U/ML — HIGH
SARS-COV-2 IGG+IGM SERPL QL IA: POSITIVE
SARS-COV-2 RNA SPEC QL NAA+PROBE: SIGNIFICANT CHANGE UP
SODIUM SERPL-SCNC: 143 MMOL/L — SIGNIFICANT CHANGE UP (ref 135–145)
SP GR SPEC: 1.02 — SIGNIFICANT CHANGE UP (ref 1.01–1.05)
THC UR QL: NEGATIVE — SIGNIFICANT CHANGE UP
TOXICOLOGY SCREEN, DRUGS OF ABUSE, SERUM RESULT: SIGNIFICANT CHANGE UP
TSH SERPL-MCNC: 0.94 UIU/ML — SIGNIFICANT CHANGE UP (ref 0.27–4.2)
UROBILINOGEN FLD QL: SIGNIFICANT CHANGE UP
VALPROATE SERPL-MCNC: 25.9 UG/ML — LOW (ref 50–100)
WBC # BLD: 6.57 K/UL — SIGNIFICANT CHANGE UP (ref 3.8–10.5)
WBC # FLD AUTO: 6.57 K/UL — SIGNIFICANT CHANGE UP (ref 3.8–10.5)
WBC UR QL: 1 /HPF — SIGNIFICANT CHANGE UP (ref 0–5)

## 2023-01-22 PROCEDURE — 99285 EMERGENCY DEPT VISIT HI MDM: CPT

## 2023-01-22 RX ORDER — DIVALPROEX SODIUM 500 MG/1
500 TABLET, DELAYED RELEASE ORAL ONCE
Refills: 0 | Status: COMPLETED | OUTPATIENT
Start: 2023-01-22 | End: 2023-01-22

## 2023-01-22 RX ORDER — RISPERIDONE 4 MG/1
2 TABLET ORAL ONCE
Refills: 0 | Status: COMPLETED | OUTPATIENT
Start: 2023-01-22 | End: 2023-01-22

## 2023-01-22 RX ORDER — OXYMETAZOLINE HYDROCHLORIDE 0.5 MG/ML
2 SPRAY NASAL ONCE
Refills: 0 | Status: COMPLETED | OUTPATIENT
Start: 2023-01-22 | End: 2023-01-22

## 2023-01-22 RX ADMIN — DIVALPROEX SODIUM 500 MILLIGRAM(S): 500 TABLET, DELAYED RELEASE ORAL at 19:06

## 2023-01-22 RX ADMIN — RISPERIDONE 2 MILLIGRAM(S): 4 TABLET ORAL at 19:05

## 2023-01-22 NOTE — ED BEHAVIORAL HEALTH ASSESSMENT NOTE - SUMMARY
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 SAD, multiple prior psychiatric admissions (most recently at [facility] from 4/15/2021-4/22/2021), currently in outpatient treatment with [facility] (Dr. Pop), no prior SAs/SIB, hx of aggressive behavior (loud, breaking property), no legal hx, PMH of DM, who was brought in by son for bizarre behavior.    On initial assessment, pt is mainly cooperative though labile (irritable at times, though laughing at other times). Pt was able to be re-directed. She denies any manic or psychotic symptoms including AVH though concerns for some guardedness and minimization of her symptoms. Collateral concerning for decompensation of her underlying bipolar disorder in the setting of possible medication non-adherence (although pt adamant that she takes her medications consistently). At this time, there is concerns that pt's sx are likely to worsen and could require hospitalization for further safety and stabilization.    Will obtain labs including a depakote level. If low, likely consider hospital admission as manic and psychotic symptoms likely to worsen. If level wnl and pt able to continue remaining calm, can potentially clear for discharge with close follow-up with her outpatient provider.     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 Pop), no prior SAs/SIB, hx of aggressive behavior (loud, breaking property), no legal hx, PMH of DM, who was brought in by son for bizarre behavior.    On initial assessment, pt is mainly cooperative though labile (irritable at times, though laughing at other times). Pt was able to be re-directed. She denies any manic or psychotic symptoms including AVH though concerns for some guardedness and minimization of her symptoms. Collateral concerning for decompensation of her underlying bipolar disorder in the setting of possible medication non-adherence (although pt adamant that she takes her medications consistently). At this time, there is concerns that pt's sx are likely to worsen and could require hospitalization for further safety and stabilization.    Will obtain labs including a depakote level. If low, likely consider hospital admission as manic and psychotic symptoms likely to worsen. If level wnl and pt able to continue remaining calm, can potentially clear for discharge with close follow-up with her outpatient provider.     Dispo pending labs, will re-assess.    Update at 5:21 pm- Depakote level at 25, which is consistent with pt's nonadherence to medication. Given collateral concerning for acute change in mental status within the last 2 weeks including more bizarre behavior in the setting of nonadherence to medication, pt would benefit from inpatient psychiatric admission. 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 Pop), no prior SAs/SIB, hx of aggressive behavior (loud, breaking property), no legal hx, PMH of DM, who was brought in by son for bizarre behavior.    On initial assessment, pt is mainly cooperative though labile (irritable at times, though laughing at other times). Pt was able to be verbally re-directed. She denies any manic or psychotic symptoms including AVH though concerns for some guardedness and minimization of her symptoms. Collateral concerning for decompensation of her underlying bipolar disorder in the setting of possible medication non-adherence (although pt adamant that she takes her medications consistently). At this time, there is concerns that pt's sx are likely to worsen and could require hospitalization for further safety and stabilization.    Will obtain labs including a depakote level. If low, likely consider hospital admission as manic and psychotic symptoms likely to worsen without further intervention. If level wnl and pt able to continue remaining calm, can potentially clear for discharge with close follow-up with her outpatient provider.     Dispo pending labs, will re-assess.    Update at 5:21 pm- Depakote level at 25, which is consistent with pt's nonadherence to medication. Given collateral concerning for acute change in mental status within the last 2 weeks including more bizarre behavior in the setting of nonadherence to medication, pt would benefit from inpatient psychiatric admission.    Plan:  --Legal: Admit 9.27  --Safety: routine checks  --Psychiatric: Continue home medications: risperidone 2 mg qhs for psychosis, depakote 500 mg qhs (primary team to continue titration to home dose of 1000 mg qhs) for mood stabilization. PRNs: Haldol 5/Ativan 2/Benadryl 50 mg PO/IM q6h for agitation  --Medical: Pt with hx of diabetes, recommend A1C, lipid panel  --I/G/M therapy as appropriate  --Dispo/Collateral: pt to be admitted, pending bed availability, collateral obtained from family

## 2023-01-22 NOTE — ED BEHAVIORAL HEALTH ASSESSMENT NOTE - NSBHATTESTCOMMENTATTENDFT_PSY_A_CORE
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 Pop), no prior SAs/SIB, hx of aggressive behavior (loud, breaking property), no legal hx, PMH of DM, who was brought in by son for bizarre behavior. On evaluation pt is irritable (both at home and in ED), was threatening to destroy property, intermittently non adherent with medications, not keeping up with her outpt appointments (hasn't seen her outpt psychiatrist since the summer), & paranoid (at times isn't eating due to paranoia about her food). Likely worsening laurent in the setting of treatment non adherence. Depakote level is low, c/w concerns from collateral of an acute change in behavior 2.5 weeks ago and treatment non adherence. Pt appears to be at imminent risk to others and appears to be unable to care for self. She requires inpt hospitalization for safety and treatment.  Plan:  Admit on 9.27, holding on the ED as there are no beds  Given concerns for intermittent treatment adherence: risperidone 2 mg qHS, restarting depakote 500 mg qhs (plan to titrate to 1000 mg qhs).   PRNs; Haldol 5/Ativan 2/Benadryl 50 mg PO/IM q6h for agitation

## 2023-01-22 NOTE — ED PROVIDER NOTE - CLINICAL SUMMARY MEDICAL DECISION MAKING FREE TEXT BOX
58 y/o F hx  Bipolar D/O    Labs, Urine Tox/UA, EKG  Medical evaluation performed. There is no clinical evidence of intoxication or any acute medical problem requiring immediate intervention. Patient is awaiting psychiatric consultation. Final disposition will be determined by psychiatrist.

## 2023-01-22 NOTE — ED BEHAVIORAL HEALTH NOTE - BEHAVIORAL HEALTH NOTE
Obtained collateral from pt's nephew [phone]. In the past she has a history of breaking stuff when decompensating. Yesterday, she got frustrated with her laptop. She was locked out of the laptop, they explained that they couldn't help her with it and she needed to take. She grabbed the laptop, she started screaming and threatened to break the laptop. He doesn't believe she has been taking her medication regularly. His mother (the pt's sister) states she hasn't been taking her medication. She began behaving strangely approximately 2.5 weeks ago.

## 2023-01-22 NOTE — ED PROVIDER NOTE - TEMPLATE, MLM
Discharge Instructions for  [facility]  [place] [address], [address] [facility]  Telephone: [phone]    NAME:  Serena Duran OF BIRTH:  1970  DATE:  8/26/2022    Wound Cleansing:   Do not scrub or use excessive force. Cleanse wound prior to applying a clean dressing with:  [] Normal Saline   [] Keep Wound Dry in Shower      [] Wound Cleanser (***)  [] May Shower at Discharge   [x] cleanse with Canary Dex and Canary Dex baby shampoo lather leave 2-3 then rinse with water, pat dry and redress wound. Dressings:           Wound Location Right Lateral Ankle     Apply Primary Dressing:  Vera, gauze, kerlix, tape, single tube    Change dressing:   [] Daily      [x] Every Other Day   [] Three times per week  [] Once a week   [] Do Not Change Dressing     [] Other:    Dietary:  [x] Diet as tolerated: [] Diabetic Diet:   [x] Increase Protein: examples ( Meat, cheese, eggs, greek yogurt, premier protein drink, fish, nuts )   [] Other:    Return Appointment:  [] Wound assessment with Nurse at wound center in *** days     [x] Return Appointment: With Dr. Arelis Becerra  2 weeks     Electronically signed on 8/26/2022 at [facility]: Should you experience any significant changes in your wound(s) or have questions about your wound care, please contact the [facility] at [address] [place] [address] 8:00 am - 4:30. If you need help with your wound outside these hours and cannot wait until we are again available, contact your PCP or go to the hospital emergency room. PLEASE NOTE: IF YOU ARE UNABLE TO OBTAIN WOUND SUPPLIES, CONTINUE TO USE THE SUPPLIES YOU HAVE AVAILABLE UNTIL YOU ARE ABLE TO REACH US. IT IS MOST IMPORTANT TO KEEP THE WOUND COVERED AT ALL TIMES.      Physician Signature:_______________________  Dr. Arelis Becerra Psych/Behavioral

## 2023-01-22 NOTE — ED PROVIDER NOTE - PATIENT PORTAL LINK FT
You can access the FollowMyHealth Patient Portal offered by [facility] by registering at the following website: [email or web address]. By joining [facility]’s FollowMyHealth portal, you will also be able to view your health information using other applications (apps) compatible with our system.

## 2023-01-22 NOTE — ED BEHAVIORAL HEALTH ASSESSMENT NOTE - DESCRIPTION
Pt was seen pacing though generally calm and cooperative.    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

## 2023-01-22 NOTE — ED ADULT NURSE NOTE - CHIEF COMPLAINT QUOTE
patient states" My son brought me here and I am Ok" son states my mom is full psychotic state , talking to her self" h/o bipolar. patient is loud and aggressive with her son in triage. son Sergio #[phone]

## 2023-01-22 NOTE — ED PROVIDER NOTE - OBJECTIVE STATEMENT
56 y/o F hx  Bipolar D/O  BIB family  secondary to laurent and bizarre behaviour.  Patient appears paranoid.   Admits to medication non -compliance . Denies pain, SOB, fever, chills, chest/abdominal discomfort.  Denies SI/HI//AH /VH. Denies falling, punching or kicking any objects.  No evidence of physical injuries, broken skin or deformity. Denies use of alcohol or illicit drugs.

## 2023-01-22 NOTE — ED BEHAVIORAL HEALTH ASSESSMENT NOTE - RISK ASSESSMENT
Pt is at low risk of suicide at this time. Static risk factors include prior psychiatric admissions, underlying mood/psychotic disorder. Dynamic risk factors include potential non-adherence to medication, irritability, unemployed, not currently adherent with outpatient psychiatric provider, worsening family stressors. Protective factors include supportive family, residential stability, no current SI. At this time, pt's sx likely can be mitigated through outpatient care. Pt is at low risk of suicide at this time. Static risk factors include prior psychiatric admissions, underlying mood/psychotic disorder. Dynamic risk factors include potential non-adherence to medication, irritability, unemployed, not currently adherent with outpatient psychiatric provider, worsening family stressors. Protective factors include supportive family, residential stability, no current SI. At this time, pt requires inpatient psychiatric admission for further mitigation of symptoms. Pt is at low risk of suicide at this time. Static risk factors include prior psychiatric admissions, underlying mood/psychotic disorder. Dynamic risk factors include potential non-adherence to medication, irritability, unemployed, not currently adherent with outpatient psychiatric provider, worsening family stressors. Protective factors include supportive family, residential stability, no current SI. At this time, pt requires inpatient psychiatric admission for further mitigation of symptoms.    Pt is at elevated risk for aggression due to history of aggression towards property, irritability, limited treatment adherence, limited insight, and paranoia. Pt is able to be redirected while in the ED so there is no indication for 1:1 at this time

## 2023-01-22 NOTE — ED PROVIDER NOTE - CONSTITUTIONAL MANNER
Lab Results   Component Value Date    HGBA1C 7 4 (H) 12/10/2018       Recent Labs      12/14/18   1807  12/14/18   2105  12/15/18   0740  12/15/18   1149   POCGLU  129  356*  107  201*       Blood Sugar Average: Last 72 hrs:  (P) 168 4     - monitor blood glucose frequently  - will decrease Levemir from 50 units to 45 units qhs  - Humalog 10 units TID for meal coverage    - continue hypoglycemic protocol   - insulin sliding scale appropriate for situation

## 2023-01-22 NOTE — ED ADULT NURSE NOTE - OBJECTIVE STATEMENT
Received pt in  pt cooperative verbalizing she has no idea why her son bought her to the ER, pt denies si/hi/avh presently safety & comfort measures maintained eval on going.

## 2023-01-22 NOTE — ED BEHAVIORAL HEALTH NOTE - BEHAVIORAL HEALTH NOTE
COVID Exposure Screen- collateral (i.e. third-party, chart review, belongings, etc; include EMS and ED staff)    1. *Has the patient had a COVID-19 test in the last 90 days? ( ) Yes ( ) No (x) Unknown- Reason: _____    IF YES PROCEED TO QUESTION #2. IF NO OR UNKNOWN, PLEASE SKIP TO QUESTION #3.    2. Date of test(s) and result(s): ________    3. *Has the patient tested positive for COVID-19 antibodies? ( ) Yes (x) No ( ) Unknown- Reason: _____    IF YES PROCEED TO QUESTION #4. IF NO or UNKNOWN, PLEASE SKIP TO QUESTION #5.    4. Date of positive antibody test: ________    5. *Has the patient received 2 doses of the COVID-19 vaccine? ( ) Yes (x) No ( ) Unknown- Reason: _____    IF YES PROCEED TO QUESTION #6. IF NO or UNKNOWN, PLEASE SKIP TO QUESTION #7.    6. Date of second dose: ________    7. *In the past 10 days, has the patient been around anyone with a positive COVID-19 test?* ( ) Yes (x) No ( ) Unknown- Reason: __    IF YES PROCEED TO QUESTION #8. IF NO or UNKNOWN, PLEASE SKIP TO QUESTION #13.    8. Was the patient within 6 feet of them for at least 15 minutes? ( ) Yes ( ) No ( ) Unknown- Reason: _____    9. Did the patient provide care for them? ( ) Yes ( ) No ( ) Unknown- Reason: ______    10. Did the patient have direct physical contact with them (touched, hugged, or kissed them)? ( ) Yes ( ) No ( ) Unknown- Reason: __    11. Did the patient share eating or drinking utensils with them? ( ) Yes ( ) No ( ) Unknown- Reason: ____    12. Did they sneeze, cough, or somehow get respiratory droplets on the patient? ( ) Yes ( ) No ( ) Unknown- Reason: ______    13. *Has the patient been out of [place] within the past 10 days?* ( ) Yes (x) No ( ) Unknown- Reason: _____    IF YES PLEASE ANSWER THE FOLLOWING QUESTIONS:    14. Which state/country did they go to? ______    15. Were they there over 24 hours? ( ) Yes ( ) No ( ) Unknown- Reason: ______    16. Date of return to [facility]: ______

## 2023-01-22 NOTE — ED BEHAVIORAL HEALTH ASSESSMENT NOTE - HPI (INCLUDE ILLNESS QUALITY, SEVERITY, DURATION, TIMING, CONTEXT, MODIFYING FACTORS, ASSOCIATED SIGNS AND SYMPTOMS)
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 SAD, multiple prior psychiatric admissions (most recently at [facility] from 4/15/2021-4/22/2021), no prior SAs/SIB, hx of aggressive behavior (loud, breaking property), no legal hx who was brought in by son for bizarre behavior.    Pt states that this morning, she was about to go rest when her son called her to tell her he was bringing her to see a therapist. She notes that her son sometimes "assumes I'm not feeling well" when referencing her depression. She states that she is undergoing a lot of stress at home with her family which causes her mood to be "so so"  though guarded with further details about this.     She gets increasingly irritable with writer when asked if she recently got into any arguments or fights with anyone, asking writer why she needed to know this information. She then denied any recent fights or arguments. She denies any SI/HI, denies any AVH. She notes that her sleep has been fair, denies any changes with her appetite.    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 Pop), no prior SAs/SIB, hx of aggressive behavior (loud, breaking property), no legal hx, PMH of DM, who was brought in by son for bizarre behavior.    Pt states that this morning, she was about to go rest when her son called her to tell her he was bringing her to see a therapist. She notes that her son sometimes "assumes I'm not feeling well" when referencing her depression. She states that she is undergoing a lot of stress at home with her family which causes her mood to be "so so"  though guarded with further details about this.     She gets increasingly irritable with writer when asked if she recently got into any arguments or fights with anyone, asking writer why she needed to know this information. She then denied any recent fights or arguments. She denies any SI/HI, denies any AVH. She notes that her sleep has been fair, denies any changes with her appetite.    She states that she spends her days reading, playing games on her phone, and tending to her ADLs. When asked if she was suicidal, she responded saying "no, I'm in love." Then proceeds to say she has a partner of 3 years though would not disclose further details.     She denies any periods of laurent in the past, denying grandiosity, saying "only God is on top of the world." She states that she has been taking her medications (risperidone 2 mg and depakote 1000 mg qhs) consistently though will forget once in awhile. She last picked up a prescription 2 weeks ago. She does not wish to be on medications for her life as she is unhappy that some of the side effects include changes to her blood sugars. She cannot recall when she last saw her psychiatrist, though when prompted by writer, she was able to remember seeing a provider through [facility] on Zoom in August 2022.     She denies any alcohol or illicit drug use.    Per collateral from pt's son, Sergio ([phone]):   Sergio does not live with patient so information was mainly conveyed from his cousins and aunt who currently resides with the pt. Sergio states that his cousins have noticed that she has been singing and talking to herself more and engaging in dangerous activity online (sending people money, providing pin codes). She has also been more labile, crying and getting more irritable frequently. He states that she has been "losing touch with reality." He is unsure if his mother has been adherent with her medications consistently though states that his aunt states she has not been.     *See ED Behavioral Health Note for collateral from pt's nephew, Alexander*

## 2023-01-22 NOTE — ED ADULT TRIAGE NOTE - CHIEF COMPLAINT QUOTE
patient states" My son brought me here and I am Ok" son states my mom is full psychotic state , talking to her self" h/o bipolar. patient is loud and aggressive with her son in triage. son Sergio #[phone]

## 2023-01-22 NOTE — ED BEHAVIORAL HEALTH ASSESSMENT NOTE - PSYCHIATRIC ISSUES AND PLAN (INCLUDE STANDING AND PRN MEDICATION)
Pt currently on risperidone 2 mg qhs (likely will titrate to 3 mg) for psychosis. Depakote 1000 mg qhs for mood instability. PRNs: Haldol 5/Ativan 2/Benadryl 50 mg q6h PO/IM PRN for agitation

## 2023-01-22 NOTE — ED BEHAVIORAL HEALTH ASSESSMENT NOTE - OTHER PAST PSYCHIATRIC HISTORY (INCLUDE DETAILS REGARDING ONSET, COURSE OF ILLNESS, INPATIENT/OUTPATIENT TREATMENT)
hx of multiple inpt admissions, most recently at [facility] in April 2021  currently in outpt treatment with Dr. Pop (though per chart review, pt last appointment in Aug '22, though has been continuing to get refills of her medications)

## 2023-01-22 NOTE — ED BEHAVIORAL HEALTH ASSESSMENT NOTE - NS ED BHA PLAN PSYCHIATRIC ISSUES2 FT
Standing: Continue risperidone 2 mg, depakote 500 mg qhs (plan to titrate to 1000 mg qhs). PRNs; Haldol 5/Ativan 2/Benadryl 50 mg PO/IM q6h for agitation

## 2023-01-23 DIAGNOSIS — F33.9 MAJOR DEPRESSIVE DISORDER, RECURRENT, UNSPECIFIED: ICD-10-CM

## 2023-01-23 PROCEDURE — 99214 OFFICE O/P EST MOD 30 MIN: CPT

## 2023-01-23 RX ORDER — DIPHENHYDRAMINE HCL 50 MG
50 CAPSULE ORAL ONCE
Refills: 0 | Status: DISCONTINUED | OUTPATIENT
Start: 2023-01-23 | End: 2023-02-03

## 2023-01-23 RX ORDER — RISPERIDONE 4 MG/1
3 TABLET ORAL AT BEDTIME
Refills: 0 | Status: DISCONTINUED | OUTPATIENT
Start: 2023-01-23 | End: 2023-01-31

## 2023-01-23 RX ORDER — DIVALPROEX SODIUM 500 MG/1
1000 TABLET, DELAYED RELEASE ORAL AT BEDTIME
Refills: 0 | Status: DISCONTINUED | OUTPATIENT
Start: 2023-01-23 | End: 2023-02-03

## 2023-01-23 RX ORDER — DIPHENHYDRAMINE HCL 50 MG
50 CAPSULE ORAL EVERY 6 HOURS
Refills: 0 | Status: DISCONTINUED | OUTPATIENT
Start: 2023-01-23 | End: 2023-02-03

## 2023-01-23 RX ORDER — HALOPERIDOL DECANOATE 100 MG/ML
5 INJECTION INTRAMUSCULAR ONCE
Refills: 0 | Status: DISCONTINUED | OUTPATIENT
Start: 2023-01-23 | End: 2023-02-03

## 2023-01-23 RX ORDER — HALOPERIDOL DECANOATE 100 MG/ML
5 INJECTION INTRAMUSCULAR ONCE
Refills: 0 | Status: COMPLETED | OUTPATIENT
Start: 2023-01-23 | End: 2023-01-23

## 2023-01-23 RX ORDER — HALOPERIDOL DECANOATE 100 MG/ML
5 INJECTION INTRAMUSCULAR EVERY 6 HOURS
Refills: 0 | Status: DISCONTINUED | OUTPATIENT
Start: 2023-01-23 | End: 2023-02-03

## 2023-01-23 RX ADMIN — HALOPERIDOL DECANOATE 5 MILLIGRAM(S): 100 INJECTION INTRAMUSCULAR at 13:09

## 2023-01-23 RX ADMIN — Medication 2 MILLIGRAM(S): at 13:11

## 2023-01-23 RX ADMIN — RISPERIDONE 3 MILLIGRAM(S): 4 TABLET ORAL at 21:13

## 2023-01-23 RX ADMIN — DIVALPROEX SODIUM 1000 MILLIGRAM(S): 500 TABLET, DELAYED RELEASE ORAL at 21:13

## 2023-01-23 NOTE — ED BEHAVIORAL HEALTH PROGRESS NOTE - TRANSFER ATTEMPTS TO INPATIENT BH SINCE LAST ASSESSMENT
[facility].../[facility].../[facility].../[place].../[facility]...

## 2023-01-23 NOTE — ED BEHAVIORAL HEALTH PROGRESS NOTE - PSYCHIATRIC ISSUES AND PLAN (INCLUDE STANDING AND PRN MEDICATION)
risperidone 2 mg qhs for psychosis, depakote  mg qhs (primary team to continue titration to home dose of 1000 mg qhs) for mood stabilization. PRNs: Haldol 5/Ativan 2/Benadryl 50 mg PO/IM q6h for agitation will increase risperidone to 3mg qhs for psychosis, increase depakote ER to 1000 mg qhs. PRNs: Haldol 5/Ativan 2/Benadryl 50 mg PO/IM q6h for agitation

## 2023-01-23 NOTE — ED BEHAVIORAL HEALTH PROGRESS NOTE - RISK ASSESSMENT
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 Pop), no prior SAs/SIB, hx of aggressive behavior (loud, breaking property), no legal hx, PMH of DM, who was brought in by son for bizarre behavior. On evaluation pt is irritable (both at home and in ED), was threatening to destroy property, intermittently non adherent with medications, not keeping up with her outpt appointments (hasn't seen her outpt psychiatrist since the summer), & paranoid (at times isn't eating due to paranoia about her food). Likely worsening laurent in the setting of treatment non adherence. Depakote level is low, c/w concerns from collateral of an acute change in behavior 2.5 weeks ago and treatment non adherence. Pt appears to be at imminent risk to others and appears to be unable to care for self. She requires inpt hospitalization for safety and treatment.

## 2023-01-23 NOTE — ED BEHAVIORAL HEALTH PROGRESS NOTE - DETAILS:
Patient asks to go home and states she is here due to "lies". Patient states she takes her medicine unless she 'forgets'. Confronted with the report from family, regarding singing, giving money away, etc - pt states that "it is not their business who I help" and "they want my money". Patient is hyperverbal, and when writer points it out to pt, she states "this is how I talk". Writer informed pt that she is admitted against her will for further observation and treatment, pt was reluctantly accepting of the info. When writer left the room, staff noted pt to be pacing and singing to herself.

## 2023-01-23 NOTE — ED BEHAVIORAL HEALTH PROGRESS NOTE - CASE SUMMARY/FORMULATION (CLEARLY DOCUMENT RATIONALE FOR DISPOSITION CHANGE)
Patient with bipolar vs schizoaffective presenting for bizarre behavior. Patient irritable, at home was threatening property destruction, poorly adherent to meds, suspicious, reportedly giving money and her ZAIRE pin to strangers. Cannot adequately care for self due to worsening laurent, requires inpatient stabilization.

## 2023-01-23 NOTE — ED ADULT NURSE REASSESSMENT NOTE - NS ED NURSE REASSESS COMMENT FT1
Received report from previous shift rn, pt sleeping on stretcher, respirations even and non-labored. Safety measures maintained, pending assignment

## 2023-01-23 NOTE — BH PATIENT PROFILE - NSICDXPASTMEDICALHX_GEN_ALL_CORE_FT
PAST MEDICAL HISTORY:  Bipolar 1 disorder     Bipolar illness     DM (diabetes mellitus)     Psychiatric disorder on rispiradone

## 2023-01-23 NOTE — BH PATIENT PROFILE - BRADEN MOISTURE
[No Acute Distress] : no acute distress [Well Nourished] : well nourished [Well Developed] : well developed [Normal Sclera/Conjunctiva] : normal sclera/conjunctiva [PERRL] : pupils equal round and reactive to light [No Lymphadenopathy] : no lymphadenopathy [No Respiratory Distress] : no respiratory distress  [No Accessory Muscle Use] : no accessory muscle use [Clear to Auscultation] : lungs were clear to auscultation bilaterally [Normal] : normal rate, regular rhythm, normal S1 and S2 and no murmur heard [No Carotid Bruits] : no carotid bruits [No Edema] : there was no peripheral edema [Soft] : abdomen soft [Non Tender] : non-tender [Non-distended] : non-distended [Normal Posterior Cervical Nodes] : no posterior cervical lymphadenopathy [Normal Anterior Cervical Nodes] : no anterior cervical lymphadenopathy [No Rash] : no rash [Coordination Grossly Intact] : coordination grossly intact [Normal Gait] : normal gait [Normal Affect] : the affect was normal [Normal Insight/Judgement] : insight and judgment were intact [de-identified] : sinus pressure/tenderness on palpation, facial discomfort on forward bend, boggy nasal mucosa, large PND  (3) occasionally moist

## 2023-01-23 NOTE — ED ADULT NURSE REASSESSMENT NOTE - NS ED NURSE REASSESS COMMENT FT1
Pt brought over to [place], offers no complaints at this time, ambulatory w steady gait and safety maintainted.

## 2023-01-24 LAB
A1C WITH ESTIMATED AVERAGE GLUCOSE RESULT: 6.1 % — HIGH (ref 4–5.6)
CHOLEST SERPL-MCNC: 222 MG/DL — HIGH
ESTIMATED AVERAGE GLUCOSE: 128 — SIGNIFICANT CHANGE UP
HCG UR QL: NEGATIVE — SIGNIFICANT CHANGE UP
HDLC SERPL-MCNC: 40 MG/DL — LOW
LIPID PNL WITH DIRECT LDL SERPL: 158 MG/DL — HIGH
NON HDL CHOLESTEROL: 182 MG/DL — HIGH
TRIGL SERPL-MCNC: 119 MG/DL — SIGNIFICANT CHANGE UP

## 2023-01-24 PROCEDURE — 90853 GROUP PSYCHOTHERAPY: CPT

## 2023-01-24 PROCEDURE — 99223 1ST HOSP IP/OBS HIGH 75: CPT

## 2023-01-24 RX ORDER — SODIUM CHLORIDE 0.65 %
1 AEROSOL, SPRAY (ML) NASAL AT BEDTIME
Refills: 0 | Status: DISCONTINUED | OUTPATIENT
Start: 2023-01-24 | End: 2023-02-03

## 2023-01-24 RX ADMIN — Medication 1 SPRAY(S): at 21:17

## 2023-01-24 RX ADMIN — DIVALPROEX SODIUM 1000 MILLIGRAM(S): 500 TABLET, DELAYED RELEASE ORAL at 21:15

## 2023-01-24 RX ADMIN — RISPERIDONE 3 MILLIGRAM(S): 4 TABLET ORAL at 21:15

## 2023-01-24 NOTE — BH INPATIENT PSYCHIATRY ASSESSMENT NOTE - CURRENT MEDICATION
MEDICATIONS  (STANDING):  divalproex ER 1000 milliGRAM(s) Oral at bedtime  risperiDONE   Tablet 3 milliGRAM(s) Oral at bedtime  sodium chloride 0.65% Nasal 1 Spray(s) Both Nostrils at bedtime    MEDICATIONS  (PRN):  diphenhydrAMINE 50 milliGRAM(s) Oral every 6 hours PRN Extrapyramidal prophylaxis  diphenhydrAMINE Injectable 50 milliGRAM(s) IntraMuscular once PRN Extrapyramidal prophylaxis  haloperidol     Tablet 5 milliGRAM(s) Oral every 6 hours PRN agitation  haloperidol    Injectable 5 milliGRAM(s) IntraMuscular once PRN agitation  LORazepam     Tablet 2 milliGRAM(s) Oral every 6 hours PRN Anxiety  LORazepam   Injectable 2 milliGRAM(s) IntraMuscular once PRN Agitation

## 2023-01-24 NOTE — BH INPATIENT PSYCHIATRY ASSESSMENT NOTE - NSBHSUBSTUSED_PSY_A_CORE
Pt reports carpal tunnel worsening, already wearing wrist braces, discussed hot & cold therapy; GBS culture collected today; labor precautions reviewed, pt to call with regular uterine ctx, SROM, vaginal bleeding, or decreased fetal movement; RTC in 1 week.    Alcohol

## 2023-01-24 NOTE — BH INPATIENT PSYCHIATRY ASSESSMENT NOTE - NSBHATTESTAPPBILLTIME_PSY_A_CORE
I attest my time as LIANNE is greater than 50% of the total combined time spent on qualifying patient care activities. I have reviewed and verified the documentation.

## 2023-01-24 NOTE — BH SOCIAL WORK INITIAL PSYCHOSOCIAL EVALUATION - OTHER PAST PSYCHIATRIC HISTORY (INCLUDE DETAILS REGARDING ONSET, COURSE OF ILLNESS, INPATIENT/OUTPATIENT TREATMENT)
Patient is a 58 y/o female with history of Bipoar d/o vs. SAD with multiple psych. hospitalization most recently at [facility] from 4/15/21-4/22/21 and  currently in outpatient tx. with Dr. Pop at [facility].  Pt. has hx. of aggressive behavior being loud and breaking property.  Pt. was bib son for bizarre behavior including at times not eating her food to paranoid thoughts, screaming and threatening to break her laptop when she w locked out of her laptop.   Pt. likely with worsening laurent in the setting of non-compliance of tx. and meds.

## 2023-01-24 NOTE — BH INPATIENT PSYCHIATRY ASSESSMENT NOTE - NSBHASSESSSUMMFT_PSY_ALL_CORE
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 Pop), no prior SAs/SIB, hx of aggressive behavior (loud, breaking property), no legal hx, PMH of DM, who was brought in by son for bizarre behavior.    On the unit, pt was seen with covering SW present. Pt presents as hyperverbal, religiously preoccupied, paranoid towards family, pressured, loud, labile, irritable, with poor insight into illness and need for medications, when asked about +AH states, "It's personal" and refuses to elaborate, "let's focus on the physical realm because you will not understand the spiritual realm." Pt refuses to discuss +VH and instead states there is an "invisible spiritual armor" around her that keeps her from being harmed. "I am under a rock. You cannot hurt me. You will hit a rock!" When asked why she was brought into the hospital pt states that she is Evangelical and has certain dietary needs that has her separate her cookware from her Mormon family's and this has caused a clash in the home. 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 She states that "everyone" wants to "make me pass as a crazy person" and she is here to take "crazy" off her records. When asked why anyone would want to wish her ill, pt states she does not know but people are "in on the conspiracy." Pt asked  to elaborate on the conspiracy, but is unable to do so, "Some people just get it in their heads to bother you!" When asked about ED report that she is giving away her money and ZAIRE PIN codes, pt becomes more verbally agitated and loud stating that people are trying to control her. Pt states that when she gives away money to her "friend" her family gets upset because they do not get the money instead. She states that her family had been using her for money. Pt then states that she does not eat some days because she has no money and became upset that she did not qualify for food stamps "they say I make too much money!" Attempted to reality test pt that perhaps she should not give away her money if she cannot afford food for herself and goes without eating some days, but pt was not receptive. She states that she had to quit her job and going to school for nursing because of "stress" at home. Pt asked why she takes depakote and risperdal and states this is for her depression. Pt then denies any sxs of depression. Pt endorses that she has only seen Dr. Pop via zoom and cannot remember the last time she spoke with him. Pt endorses that she sometimes "skips" her medication 1-2 times a week and became agitated when educated that medications should be taken on a consistent basis, "No, I take it when I need it. It is ok to skip it!" Pt admits to skipping several days worth of meds prior to admission, but does not feel this is why she is hospitalized. Pt does not believe she is ill and believes her son was misinformed by her sister and nephew. Pt went on to state that God answers her prayers, but when asked how this happens, pt repeated that "you will not understand. You need to pray." Pt believes God will bring her things as long as she prays for it. Denies SIIP or HIIP; citing her [employer] beliefs. Reports "good" sleep, unable to quantify. Reports fair energy, denies getting into arguments with people, "they argue with themselves!" Denies goal directed/risk taking behaviors. Pt refused to give HIPPA release to speak to any of her family.    Encrypted [employer] email sent to Dr. Pop - met with pt twice in August 2022, pt has not scheduled follow up appt since, refills were sent in hopes that the pt would reschedule appts, pt reported weight gain concerns but refused abilify trial, refused metformin, poor insight at baseline, frequently non-adherent to tx     PMH: DM (not on meds, managed via diet), chronic sinus infections  Allergies: PCN (itchiness, pins and needles), eggplant (hives on chest), seafood (unknown rxn - states she will not eat anything from sea unless it has scales - appears to be Methodist preference rather than allergy, but pt insists it is an allergy)  Substances: denies tobacco product use, "rare" use of wine (cannot remember when the last drink was), denies any other substances of abuse.     PLAN:   - involuntary admission to [facility] ([facility])  - routine checks (adamantly denies SIIP/HIIP)  - re-start risperdal 3 mg PO QHS (plan for BROWER)  - re-start depakote 1000 mg PO QHS  - PO/IM PRN haldol, ativan, benadryl  - saline nasal spray QHS per pt request   - draw labs for lipid panel, A1c, and urine pregnancy (pt reports menses stopped at age 41 y/o and has been celibate though)  - change diet to kosher per pt request; request nutrition consult  - encourage I/G/M therapy

## 2023-01-24 NOTE — BH INPATIENT PSYCHIATRY ASSESSMENT NOTE - NSBHCHARTREVIEWVS_PSY_A_CORE FT
Vital Signs Last 24 Hrs  T(C): 36.8 (01-24-23 @ 08:49), Max: 36.8 (01-24-23 @ 08:49)  T(F): 98.2 (01-24-23 @ 08:49), Max: 98.2 (01-24-23 @ 08:49)  HR: 95 (01-23-23 @ 14:05) (95 - 95)  BP: 132/60 (01-23-23 @ 14:05) (132/60 - 132/60)  BP(mean): --  RR: 17 (01-23-23 @ 14:05) (17 - 17)  SpO2: 100% (01-23-23 @ 14:05) (100% - 100%)    Orthostatic VS  01-24-23 @ 08:49  Lying BP: --/-- HR: --  Sitting BP: 113/60 HR: 75  Standing BP: 101/60 HR: 70  Site: --  Mode: --  Orthostatic VS  01-23-23 @ 15:25  Lying BP: --/-- HR: --  Sitting BP: 106/64 HR: 88  Standing BP: 110/53 HR: 98  Site: --  Mode: --

## 2023-01-24 NOTE — BH INPATIENT PSYCHIATRY ASSESSMENT NOTE - INTERRUPTED ATTEMPT:
[address]  [address]  [address] [id number] [phone]               Thank you for choosing us for your health care visit with JEAN-PIERRE Pinto.   We are glad to serve you and happy to provide you with this summary of ABRS may be diagnosed if you’ve had an upper respiratory infection like a cold and cough for longer than 10 to 14 days. Your health care provider will ask about your symptoms and your medical history.  The provider will check your vital signs, including you Allergies as of Mar 28, 2017     Levofloxacin [Quixin] Nausea and vomiting    Significant abdominal pain    Penicillins Nausea and vomiting                Today's Vital Signs     BP Pulse Temp Height Weight BMI    126/84 mmHg 78 98.6 °F (37 °C) (Oral) For medical emergencies, dial 911. Educational Information     Your blood pressure indicates you may be at-risk for Hypertension. Please consider the following Lifestyle Modifications.   Also, please return for a follow-up Blood Pressure Check in HOW TO GET STARTED: HOW TO STAY MOTIVATED:   Start activities slowly and build up over time Do what you like   Get your heart pumping – brisk walking, biking, swimming Even 10 minute increments are effective and add up over the week   2 ½ hours per week – None known

## 2023-01-24 NOTE — BH INPATIENT PSYCHIATRY ASSESSMENT NOTE - RISK ASSESSMENT
Pt is at low risk of suicide at this time. Static risk factors include prior psychiatric admissions, underlying mood/psychotic disorder. Dynamic risk factors include potential non-adherence to medication, irritability, unemployed, not currently adherent with outpatient psychiatric provider, worsening family stressors. Protective factors include supportive family, residential stability, no current SI. At this time, pt requires inpatient psychiatric admission for further mitigation of symptoms.    Pt is at elevated risk for aggression due to history of aggression towards property, irritability, limited treatment adherence, limited insight, and paranoia. Pt is able to be redirected while in the ED so there is no indication for 1:1 at this time

## 2023-01-24 NOTE — BH INPATIENT PSYCHIATRY ASSESSMENT NOTE - OTHER PAST PSYCHIATRIC HISTORY (INCLUDE DETAILS REGARDING ONSET, COURSE OF ILLNESS, INPATIENT/OUTPATIENT TREATMENT)
hx of multiple inpt admissions, most recently at [facility] in April 2021  currently in outpt treatment with Dr. Pop (though per chart review, pt last appointment in Aug '22, though has been continuing to get refills of her medications)

## 2023-01-24 NOTE — DIETITIAN INITIAL EVALUATION ADULT - PERTINENT LABORATORY DATA
01-22 Na143 mmol/L Glu 100 mg/dL<H> K+ 4.3 mmol/L Cr  0.77 mg/dL BUN 12 mg/dL 01-22 Alb 4.0 g/dL     A1C with Estimated Average Glucose Result: 6.1 % (01-24-23 @ 09:28)  Glucose: POCT Blood Glucose.: 134 mg/dL (01-22-23 @ 14:09)    Lipid Panel: Date/Time: 01-24-23 @ 09:28  Cholesterol, Serum: 222  Direct LDL: --  HDL Cholesterol, Serum: 40  Total Cholesterol/HDL Ration Measurement: --  Triglycerides, Serum: 119

## 2023-01-24 NOTE — BH INPATIENT PSYCHIATRY ASSESSMENT NOTE - OTHER
impaired  suspicious of family motives, unclear if delusional intensity labile, guarded  reports "spiritual armor" and when asked about +AH states it is "personal" and declines to elaborate

## 2023-01-24 NOTE — DIETITIAN INITIAL EVALUATION ADULT - ORAL INTAKE PTA/DIET HISTORY
Per ED's note, patient at times isn't eating due to paranoia about her food -- Patient denies today and reports her appetite has been good, also denies changes to her po intake PTA. States she tries to follow DM diet recommendations and avoids having sweetened beverages. Hx of DM is diet controlled, not on DM meds at home. Requires Kosher meals. Endorses food allergies to eggplants and shellfish (ok with fish but prefers to have fish that has scales due to Amish).

## 2023-01-24 NOTE — BH INPATIENT PSYCHIATRY ASSESSMENT NOTE - HPI (INCLUDE ILLNESS QUALITY, SEVERITY, DURATION, TIMING, CONTEXT, MODIFYING FACTORS, ASSOCIATED SIGNS AND SYMPTOMS)
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 SAD, multiple prior psychiatric admissions (most recently at [facility] from 4/15/2021-4/22/2021), currently in outpatient treatment with [facility] (Dr. Pop), no prior SAs/SIB, hx of aggressive behavior (loud, breaking property), no legal hx, PMH of DM, who was brought in by son for bizarre behavior.    Pt states that this morning, she was about to go rest when her son called her to tell her he was bringing her to see a therapist. She notes that her son sometimes "assumes I'm not feeling well" when referencing her depression. She states that she is undergoing a lot of stress at home with her family which causes her mood to be "so so"  though guarded with further details about this.     She gets increasingly irritable with writer when asked if she recently got into any arguments or fights with anyone, asking writer why she needed to know this information. She then denied any recent fights or arguments. She denies any SI/HI, denies any AVH. She notes that her sleep has been fair, denies any changes with her appetite.    She states that she spends her days reading, playing games on her phone, and tending to her ADLs. When asked if she was suicidal, she responded saying "no, I'm in love." Then proceeds to say she has a partner of 3 years though would not disclose further details.     She denies any periods of laurent in the past, denying grandiosity, saying "only God is on top of the world." She states that she has been taking her medications (risperidone 2 mg and depakote 1000 mg qhs) consistently though will forget once in awhile. She last picked up a prescription 2 weeks ago. She does not wish to be on medications for her life as she is unhappy that some of the side effects include changes to her blood sugars. She cannot recall when she last saw her psychiatrist, though when prompted by writer, she was able to remember seeing a provider through [facility] on Zoom in August 2022.     She denies any alcohol or illicit drug use.    Per collateral from pt's son, Sergio ([phone]):   Sergio does not live with patient so information was mainly conveyed from his cousins and aunt who currently resides with the pt. Sergio states that his cousins have noticed that she has been singing and talking to herself more and engaging in dangerous activity online (sending people money, providing pin codes). She has also been more labile, crying and getting more irritable frequently. He states that she has been "losing touch with reality." He is unsure if his mother has been adherent with her medications consistently though states that his aunt states she has not been.     *See ED Behavioral Health Note for collateral from pt's nephew, Alexander*"

## 2023-01-24 NOTE — BH INPATIENT PSYCHIATRY ASSESSMENT NOTE - NSSUICPROTFACT_PSY_ALL_CORE
Responsibility to children, family, or others/Identifies reasons for living/Cultural, spiritual and/or moral attitudes against suicide/Scientologist beliefs

## 2023-01-24 NOTE — BH INPATIENT PSYCHIATRY ASSESSMENT NOTE - NSBHCRANIAL_PSY_ALL_CORE
Recognizes 2 fingers or can read (II)/Smiles, shows teeth, opens mouth, sticks out tongue (V, VII, XI)/Hearing intact (VIII)

## 2023-01-24 NOTE — BH INPATIENT PSYCHIATRY ASSESSMENT NOTE - NSBHCONSBHPROVDETAILS_PSY_A_CORE  FT
Encrypted [facility] email sent to Dr. Pop in [facility] - pls see note on 1/24/23 for discussion details

## 2023-01-24 NOTE — DIETITIAN INITIAL EVALUATION ADULT - DIET TYPE
Kosher diet/DASH/TLC (sodium and cholesterol restricted diet)/consistent carbohydrate (evening snack)

## 2023-01-24 NOTE — PSYCHIATRIC REHAB INITIAL EVALUATION - NSBHPRRECOMMEND_PSY_ALL_CORE
Writer met with pt to introduce pt to unit, department functions, and psychiatric rehabilitation staff members. On approach, the pt was minimally verbal and appeared with constricted facial affect. Per chart, the pt is a 57 year old unemployed female, with a PPHx of Schizoaffective Disorder and BPD. Pt has a hx of multiple previous psychiatric admissions, last at [facility] in April of 2021. Pt’s chart indicates that the pt is being seen in outpatient treatment, however report indicates that she has not been in treatment since the summer of 2022 and collateral obtained by family members indicates that she is most likely not taking psychiatric medications. Pt has a hx of aggressive behavior, no hx of SI/SA. Pt is BIB son for bizarre behavior, presenting with minimal insight, is selectively mute, and prior to admission was observed as impulsive as she was giving away her money and PIN # to her debit card to people. Pt lives with family. Per chart, pt denies SI/HI/I/P and AH/VH/TH upon admission. Writer will encourage the pt to engage in the group process for adaptive coping skill development and positive socialization. Will continue to support the pt during the current stay.

## 2023-01-24 NOTE — BH INPATIENT PSYCHIATRY ASSESSMENT NOTE - NSBHMETABOLIC_PSY_ALL_CORE_FT
BMI: BMI (kg/m2): 31.7 (01-23-23 @ 15:25)  HbA1c: A1C with Estimated Average Glucose Result: 6.1 % (01-24-23 @ 09:28)    Glucose: POCT Blood Glucose.: 134 mg/dL (01-22-23 @ 14:09)    BP: 132/60 (01-23-23 @ 14:05) (107/60 - 141/62)  Lipid Panel: Date/Time: 01-24-23 @ 09:28  Cholesterol, Serum: 222  Direct LDL: --  HDL Cholesterol, Serum: 40  Total Cholesterol/HDL Ration Measurement: --  Triglycerides, Serum: 119

## 2023-01-24 NOTE — DIETITIAN INITIAL EVALUATION ADULT - OTHER INFO
Patient is a 57 year old female with PPH of reported bipolar disorder vs. SAD, multiple prior psychiatric admissions (most recently at [facility] from 4/15/2021-4/22/2021), hx of aggressive behavior (loud, breaking property), PMH of DM, who was brought in by son for bizarre behavior.  Writer met with patient in her room today who was having her lunch and was able to engage in the interview. Patient reports her appetite remains good, denies chewing/swallowing difficulties at meals, prefers to c/w Kosher meals. Food preferences taken and implemented -- dislikes pork and turkey. Observed that patient finished her meals at lunch today. Patient denies GI distress (nausea/vomiting/diarrhea/constipation). Noted HgbA1C 6.1% (1/24/23) and also elevated lipid levels, writer discussed Heart healthy diet and DM diet recommendations with patient today, patient verbalized understanding and accepted MyPlate handouts offered. Patient has no questions about her diet at this time. Case d/w RN. Wt hx per HIE: 77.1kg (Apr 2021). Current adm wt: 81.2kg (1/23/23).

## 2023-01-25 PROCEDURE — 99231 SBSQ HOSP IP/OBS SF/LOW 25: CPT

## 2023-01-25 RX ADMIN — Medication 1 SPRAY(S): at 20:38

## 2023-01-25 RX ADMIN — DIVALPROEX SODIUM 1000 MILLIGRAM(S): 500 TABLET, DELAYED RELEASE ORAL at 20:38

## 2023-01-25 RX ADMIN — RISPERIDONE 3 MILLIGRAM(S): 4 TABLET ORAL at 20:38

## 2023-01-25 NOTE — BH INPATIENT PSYCHIATRY PROGRESS NOTE - NSBHASSESSSUMMFT_PSY_ALL_CORE
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 Pop), no prior SAs/SIB, hx of aggressive behavior (loud, breaking property), no legal hx, PMH of DM, who was brought in by son for bizarre behavior.    Today, pt remains labile, religiously preoccupied, fixated on discharge, declines to speak about A/VH and refers to the "spiritual realm," irritable, low frustration tolerance, selectively mute, paranoid that her sister is part of a conspiracy against her. Gave verbal consent for staff to speak with son Sergio. c/w depakote 1000 mg PO QHS and risperdal 3 mg PO QHS with VPA, CMP, and CBC to be drawn on 1/30/23     PLAN:   - involuntary admission to [facility] ([facility])  - routine checks (adamantly denies SIIP/HIIP)  - re-start risperdal 3 mg PO QHS (plan for BROWER)  - re-start depakote 1000 mg PO QHS  - PO/IM PRN haldol, ativan, benadryl  - saline nasal spray QHS per pt request   - draw labs for lipid panel, A1c, and urine pregnancy (pt reports menses stopped at age 41 y/o and has been celibate though)  - change diet to kosher per pt request; request nutrition consult  - encourage I/G/M therapy

## 2023-01-25 NOTE — BH INPATIENT PSYCHIATRY PROGRESS NOTE - NSBHFUPINTERVALHXFT_PSY_A_CORE
Pt assessed for sxs of paranoia and laurent. Chart reviewed and case discussed with treatment team. Staff reports pt was labile, irritable, with poor frustration tolerance overnight. On approach, pt was initially selectively mute, declining to answer to her name in the mcclellan. Pt was able to open up when asked about medications. Pt volunteered that the medications were "stabilizing." When asked what was being stabilized, she became irritable, "I'm always stable! 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 Pt with continued paranoia towards her sister, "She wants to bring me down," but when asked about her son Sergio, pt visibly brightened, smiled and stated, "I love him beyond imagination!" Pt gave verbal consent to speak to Sergio, "but not my sister. She doesn't have anything good to say." Pt then spoke about moving out of her sister's home and finding her own apartment. Pt remains labile, but easier to redirect today. Denies S/HIIP. Continues to be evasive about A/VH and referring to the "spiritual realm" that this writer will not understand. Remains religiously preoccupied at times.

## 2023-01-26 PROCEDURE — 90853 GROUP PSYCHOTHERAPY: CPT

## 2023-01-26 PROCEDURE — 99231 SBSQ HOSP IP/OBS SF/LOW 25: CPT

## 2023-01-26 RX ORDER — ACETAMINOPHEN 500 MG
650 TABLET ORAL EVERY 6 HOURS
Refills: 0 | Status: DISCONTINUED | OUTPATIENT
Start: 2023-01-26 | End: 2023-02-03

## 2023-01-26 RX ADMIN — Medication 30 MILLILITER(S): at 19:11

## 2023-01-26 RX ADMIN — DIVALPROEX SODIUM 1000 MILLIGRAM(S): 500 TABLET, DELAYED RELEASE ORAL at 20:44

## 2023-01-26 RX ADMIN — RISPERIDONE 3 MILLIGRAM(S): 4 TABLET ORAL at 20:45

## 2023-01-26 NOTE — BH INPATIENT PSYCHIATRY PROGRESS NOTE - OTHER
reports "spiritual armor" and when asked about +AH states it is "personal" and declines to elaborate  speaks about the "spiritual realm" less labile, remains paranoid impaired  suspicious of family motives, unclear if delusional intensity "when can I be discharged?"

## 2023-01-26 NOTE — BH PSYCHOLOGY - GROUP THERAPY NOTE - NSPSYCHOLGRPGENPT_PSY_A_CORE FT
Patient attended the psychology cognitive-behavior therapy group. The discussion was focused on the topic of behavioral activation. Group expectations, guidelines, confidentiality and its limitations were reviewed. The group began with a description of behavioral activation as a treatment technique to decrease avoidance and isolation. Group members then learned how to introduce activities gradually into daily schedules. Also discussed were methods to create a behavioral activation schedule to assist in improving mood and activity levels.  Group members demonstrated their understanding through active participation, discussion, and by asking clarifying questions. Discussion stemmed from the group's focus on the connection between thoughts, feelings and behaviors. Group members were provided with a handout describing the concepts and strategies discussed during group.
Patient attended the psychology cognitive-behavior therapy group. The discussion was focused on the topic of Procrastination.  Group expectations, guidelines, confidentiality, and limitations were reviewed.  The group began with a description of factors that negatively influence motivation. The cycle of negative self-statements, self-defeating emotions and behaviors, and negative consequences was reviewed. Group members then learned about mindsets associated with procrastination and self-activation techniques such as activity scheduling to help increase productivity. Discussion stemmed from the group's focus on the connection between thoughts, feelings, and behaviors.  Group members demonstrated their understanding through active participation, discussion, and by asking clarifying questions.  Members were also provided with a handout describing the concepts and strategies discussed during group.

## 2023-01-26 NOTE — BH INPATIENT PSYCHIATRY PROGRESS NOTE - NSBHFUPINTERVALHXFT_PSY_A_CORE
Pt assessed for sxs of paranoia and laurent. Chart reviewed and case discussed with treatment team. Staff reports pt was labile, irritable, with poor frustration tolerance overnight. Pt reports good sleep and appetite. On approach, pt was laying in bed, but easily woken and cooperative with interview. Pt remains labile, but after some redirection is able to become calmer and more pleasant. Pt remains discharge focused but stated she understood why a VPA level needs to be drawn and monitoring pt for sxs. Pt remains paranoid towards her family, stating that her niece changed her laptop password and "I cannot do anything! I can't pay bills. I can't go to school. I can't write the letters I need for school!" Pt states that this writer would not understand her family dynamics when asked why anyone would do this to her. Denies S/HIIP. Continues to be evasive about A/VH. Less religiously preoccupied today. Pt denies any paranoia about food here on the unit.

## 2023-01-26 NOTE — BH PSYCHOLOGY - GROUP THERAPY NOTE - TOKEN PULL-DIAGNOSIS
Primary Diagnosis:  Bipolar disorder [F31.9]        Problem Dx:   Bipolar disorder [F31.9]      
Primary Diagnosis:  Bipolar disorder [F31.9]        Problem Dx:   Bipolar disorder [F31.9]

## 2023-01-26 NOTE — BH INPATIENT PSYCHIATRY PROGRESS NOTE - NSBHASSESSSUMMFT_PSY_ALL_CORE
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 Pop), no prior SAs/SIB, hx of aggressive behavior (loud, breaking property), no legal hx, PMH of DM, who was brought in by son for bizarre behavior.    Today, pt slightly less labile, less religiously preoccupied, fixated on discharge, declines to speak about A/VH, irritable, low frustration tolerance, paranoid about her family. Gave verbal consent for staff to speak with son Sergio. c/w depakote 1000 mg PO QHS and risperdal 3 mg PO QHS with VPA, CMP, and CBC to be drawn on 1/30/23 - pt aware of plan to draw labs on this day. Pt declining BROWER, but did state she would think about it when encouraged and given education    PLAN:   - involuntary admission to [facility] (2PC)  - routine checks (adamantly denies SIIP/HIIP)  - re-start risperdal 3 mg PO QHS (plan for BROWER)  - re-start depakote 1000 mg PO QHS  - PO/IM PRN haldol, ativan, benadryl  - saline nasal spray QHS per pt request   - draw labs for lipid panel, A1c, and urine pregnancy (pt reports menses stopped at age 41 y/o and has been celibate though)  - change diet to kosher per pt request; request nutrition consult  - encourage I/G/M therapy

## 2023-01-26 NOTE — BH PSYCHOLOGY - GROUP THERAPY NOTE - NSPSYCHOLGRPGENPROB_PSY_A_CORE
academic/vocational/social dysfunction/anxiety/depression
academic/vocational/social dysfunction/anxiety/depression
Clothing

## 2023-01-26 NOTE — BH PSYCHOLOGY - GROUP THERAPY NOTE - NSBHPSYCHOLPARTICIPCOMMENT_PSY_A_CORE FT
Patient arrived late to the group session. Patient interrupted the  twice. Patient was reminded of group rules and encouraged to raise her hand when contributing to the discussion. Patient then stated, “Okay thank you.” Patient then left the group and did not return. 
Patient appeared attentive and interested in the group discussion.  Although the patient did not contribute spontaneously or volunteer to read during the group session, patient was able to follow along with the group discussion. Patient was able to engage with the  appropriately.

## 2023-01-27 PROCEDURE — 99231 SBSQ HOSP IP/OBS SF/LOW 25: CPT

## 2023-01-27 RX ORDER — PHENYLEPHRINE-SHARK LIVER OIL-MINERAL OIL-PETROLATUM RECTAL OINTMENT
1 OINTMENT (GRAM) RECTAL ONCE
Refills: 0 | Status: DISCONTINUED | OUTPATIENT
Start: 2023-01-27 | End: 2023-02-03

## 2023-01-27 RX ADMIN — DIVALPROEX SODIUM 1000 MILLIGRAM(S): 500 TABLET, DELAYED RELEASE ORAL at 21:29

## 2023-01-27 RX ADMIN — RISPERIDONE 3 MILLIGRAM(S): 4 TABLET ORAL at 21:29

## 2023-01-27 NOTE — BH INPATIENT PSYCHIATRY PROGRESS NOTE - NSBHFUPINTERVALHXFT_PSY_A_CORE
Pt assessed for sxs of paranoia and laurent. Chart reviewed and case discussed with treatment team. Staff reports pt was labile, irritable, with poor frustration tolerance overnight. Pt reports good sleep and appetite. On approach, pt was laying in bed, but easily woken and cooperative with interview. Pt remains easily irritated, but able to become calmer and more pleasant as interview progressed. Pt requesting to leave unit to get a manicure and come back. Supervised sharps ordered and pt provided with education. Pt remains discharge focused but stated she understood why a VPA level needs to be drawn and monitoring pt for sxs. Pt remains paranoid towards her family, but not as perseverative of this today. Denies S/HIIP, A/VH.

## 2023-01-27 NOTE — BH INPATIENT PSYCHIATRY PROGRESS NOTE - NSBHASSESSSUMMFT_PSY_ALL_CORE
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 Pop), no prior SAs/SIB, hx of aggressive behavior (loud, breaking property), no legal hx, PMH of DM, who was brought in by son for bizarre behavior.    Today, pt slightly less labile, less religiously preoccupied, fixated on discharge, low frustration tolerance with staff, paranoid about her family. c/w depakote 1000 mg PO QHS and risperdal 3 mg PO QHS with VPA, CMP, and CBC to be drawn on 1/30/23 - pt aware of plan to draw labs on this day. Pt declining BROWRE, but did state she would think about it when encouraged and given education    PLAN:   - involuntary admission to [facility] (2P)  - routine checks (adamantly denies SIIP/HIIP)  - re-start risperdal 3 mg PO QHS (plan for BROWER)  - re-start depakote 1000 mg PO QHS  - PO/IM PRN haldol, ativan, benadryl  - saline nasal spray QHS per pt request   - draw labs for lipid panel, A1c, and urine pregnancy (pt reports menses stopped at age 41 y/o and has been celibate though)  - change diet to kosher per pt request; request nutrition consult  - encourage I/G/M therapy

## 2023-01-28 PROCEDURE — 99231 SBSQ HOSP IP/OBS SF/LOW 25: CPT

## 2023-01-28 RX ADMIN — Medication 30 MILLILITER(S): at 18:36

## 2023-01-28 RX ADMIN — RISPERIDONE 3 MILLIGRAM(S): 4 TABLET ORAL at 21:13

## 2023-01-28 RX ADMIN — DIVALPROEX SODIUM 1000 MILLIGRAM(S): 500 TABLET, DELAYED RELEASE ORAL at 21:13

## 2023-01-28 NOTE — BH INPATIENT PSYCHIATRY PROGRESS NOTE - NSBHFUPINTERVALHXFT_PSY_A_CORE
Pt followed up for psychotic decompensation and laurent.  Charts, medications, labs reviewed.  Pt discussed with nursing staff. No significant overnight concerns. Patient is eating and sleeping well. She remains in fair behavioral control, no aggression on unit. No prns for aggression. Patient adherent with her standing medications. Medication teaching provided, pt is encouraged continue her medications. Patient offers no complaints.   Pt is observed in her room, she is asleep, difficult to wake up. She appears no NAD.  Per nursing no SI/SIB/HI, no plan or intent.   No A/V hallucinations. No acute medical concerns, VSS. Continue to provide therapeutic support.

## 2023-01-28 NOTE — BH INPATIENT PSYCHIATRY PROGRESS NOTE - NSBHASSESSSUMMFT_PSY_ALL_CORE
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 from 4/15/2021-4/22/2021), currently in outpatient treatment with [facility] (Dr. Pop), no prior SAs/SIB, hx of aggressive behavior (loud, breaking property), no legal hx, PMH of DM, who was brought in by son for bizarre behavior.    Today, pt slightly less labile, less religiously preoccupied, fixated on discharge, low frustration tolerance with staff, paranoid about her family. c/w depakote 1000 mg PO QHS and risperdal 3 mg PO QHS with VPA, CMP, and CBC to be drawn on 1/30/23 - pt aware of plan to draw labs on this day. Pt declining BROWER, but did state she would think about it when encouraged and given education    PLAN:   - involuntary admission to [facility] (2P)  - routine checks (adamantly denies SIIP/HIIP)  - re-start risperdal 3 mg PO QHS (plan for BROWER)  - re-start depakote 1000 mg PO QHS  - PO/IM PRN haldol, ativan, benadryl  - saline nasal spray QHS per pt request   - draw labs for lipid panel, A1c, and urine pregnancy (pt reports menses stopped at age 43 y/o and has been celibate though)  - change diet to kosher per pt request; request nutrition consult  - encourage I/G/M therapy

## 2023-01-29 PROCEDURE — 99231 SBSQ HOSP IP/OBS SF/LOW 25: CPT

## 2023-01-29 RX ORDER — SENNA PLUS 8.6 MG/1
2 TABLET ORAL AT BEDTIME
Refills: 0 | Status: DISCONTINUED | OUTPATIENT
Start: 2023-01-29 | End: 2023-02-03

## 2023-01-29 RX ADMIN — RISPERIDONE 3 MILLIGRAM(S): 4 TABLET ORAL at 20:21

## 2023-01-29 RX ADMIN — SENNA PLUS 2 TABLET(S): 8.6 TABLET ORAL at 20:21

## 2023-01-29 RX ADMIN — DIVALPROEX SODIUM 1000 MILLIGRAM(S): 500 TABLET, DELAYED RELEASE ORAL at 20:21

## 2023-01-29 NOTE — BH INPATIENT PSYCHIATRY PROGRESS NOTE - NSBHMSESPABN_PSY_A_CORE
Loud volume/Increased productivity
Loud volume/Pressured rate/Increased productivity
Loud volume

## 2023-01-29 NOTE — BH INPATIENT PSYCHIATRY PROGRESS NOTE - NSBHFUPINTERVALHXFT_PSY_A_CORE
Pt followed up for psychotic decompensation and laurent.  Charts, medications, labs reviewed.  Pt discussed with nursing staff. No significant overnight concerns. Patient is sleeping well, eating well (but has many complaints about the food choices).  She remains in fair behavioral control, no aggression on unit. No prns for aggression. Patient adherent with her standing medications. Medication teaching provided, pt is encouraged continue her medications. Patient offers no complaints.   Pt is observed in her room, denies any depressive symptoms, reports some anxiety, no SI/SIB/HI, no plan or intent.   During interview patient presents somewhat oddly related.   Patient denies A/V hallucinations. No acute medical concerns, VSS. Continue to provide therapeutic support.

## 2023-01-29 NOTE — BH INPATIENT PSYCHIATRY PROGRESS NOTE - NSBHASSESSSUMMFT_PSY_ALL_CORE
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 Pop), no prior SAs/SIB, hx of aggressive behavior (loud, breaking property), no legal hx, PMH of DM, who was brought in by son for bizarre behavior.    Today, pt slightly less labile, less religiously preoccupied, fixated on discharge, low frustration tolerance with staff, paranoid about her family. c/w depakote 1000 mg PO QHS and risperdal 3 mg PO QHS with VPA, CMP, and CBC to be drawn on 1/30/23 - pt aware of plan to draw labs on this day. Pt declining BROWER, but did state she would think about it when encouraged and given education    PLAN:   - involuntary admission to [facility] (2P)  - routine checks (adamantly denies SIIP/HIIP)  - re-start risperdal 3 mg PO QHS (plan for BROWER)  - re-start depakote 1000 mg PO QHS  - PO/IM PRN haldol, ativan, benadryl  - saline nasal spray QHS per pt request   - draw labs for lipid panel, A1c, and urine pregnancy (pt reports menses stopped at age 43 y/o and has been celibate though)  - change diet to kosher per pt request; request nutrition consult  - encourage I/G/M therapy

## 2023-01-30 LAB
ALBUMIN SERPL ELPH-MCNC: 3.7 G/DL — SIGNIFICANT CHANGE UP (ref 3.3–5)
ALP SERPL-CCNC: 49 U/L — SIGNIFICANT CHANGE UP (ref 40–120)
ALT FLD-CCNC: 17 U/L — SIGNIFICANT CHANGE UP (ref 4–33)
ANION GAP SERPL CALC-SCNC: 11 MMOL/L — SIGNIFICANT CHANGE UP (ref 7–14)
AST SERPL-CCNC: 30 U/L — SIGNIFICANT CHANGE UP (ref 4–32)
BASOPHILS # BLD AUTO: 0.03 K/UL — SIGNIFICANT CHANGE UP (ref 0–0.2)
BASOPHILS NFR BLD AUTO: 0.4 % — SIGNIFICANT CHANGE UP (ref 0–2)
BILIRUB SERPL-MCNC: <0.2 MG/DL — SIGNIFICANT CHANGE UP (ref 0.2–1.2)
BUN SERPL-MCNC: 14 MG/DL — SIGNIFICANT CHANGE UP (ref 7–23)
CALCIUM SERPL-MCNC: 9.5 MG/DL — SIGNIFICANT CHANGE UP (ref 8.4–10.5)
CHLORIDE SERPL-SCNC: 105 MMOL/L — SIGNIFICANT CHANGE UP (ref 98–107)
CO2 SERPL-SCNC: 26 MMOL/L — SIGNIFICANT CHANGE UP (ref 22–31)
CREAT SERPL-MCNC: 0.76 MG/DL — SIGNIFICANT CHANGE UP (ref 0.5–1.3)
EGFR: 92 ML/MIN/1.73M2 — SIGNIFICANT CHANGE UP
EOSINOPHIL # BLD AUTO: 0.08 K/UL — SIGNIFICANT CHANGE UP (ref 0–0.5)
EOSINOPHIL NFR BLD AUTO: 1 % — SIGNIFICANT CHANGE UP (ref 0–6)
GLUCOSE SERPL-MCNC: 103 MG/DL — HIGH (ref 70–99)
HCT VFR BLD CALC: 39.1 % — SIGNIFICANT CHANGE UP (ref 34.5–45)
HGB BLD-MCNC: 12.6 G/DL — SIGNIFICANT CHANGE UP (ref 11.5–15.5)
IANC: 3.11 K/UL — SIGNIFICANT CHANGE UP (ref 1.8–7.4)
IMM GRANULOCYTES NFR BLD AUTO: 0.4 % — SIGNIFICANT CHANGE UP (ref 0–0.9)
LYMPHOCYTES # BLD AUTO: 3.86 K/UL — HIGH (ref 1–3.3)
LYMPHOCYTES # BLD AUTO: 49.9 % — HIGH (ref 13–44)
MCHC RBC-ENTMCNC: 28.1 PG — SIGNIFICANT CHANGE UP (ref 27–34)
MCHC RBC-ENTMCNC: 32.2 GM/DL — SIGNIFICANT CHANGE UP (ref 32–36)
MCV RBC AUTO: 87.3 FL — SIGNIFICANT CHANGE UP (ref 80–100)
MONOCYTES # BLD AUTO: 0.63 K/UL — SIGNIFICANT CHANGE UP (ref 0–0.9)
MONOCYTES NFR BLD AUTO: 8.1 % — SIGNIFICANT CHANGE UP (ref 2–14)
NEUTROPHILS # BLD AUTO: 3.11 K/UL — SIGNIFICANT CHANGE UP (ref 1.8–7.4)
NEUTROPHILS NFR BLD AUTO: 40.2 % — LOW (ref 43–77)
NRBC # BLD: 0 /100 WBCS — SIGNIFICANT CHANGE UP (ref 0–0)
NRBC # FLD: 0 K/UL — SIGNIFICANT CHANGE UP (ref 0–0)
PLATELET # BLD AUTO: 225 K/UL — SIGNIFICANT CHANGE UP (ref 150–400)
POTASSIUM SERPL-MCNC: 4.8 MMOL/L — SIGNIFICANT CHANGE UP (ref 3.5–5.3)
POTASSIUM SERPL-SCNC: 4.8 MMOL/L — SIGNIFICANT CHANGE UP (ref 3.5–5.3)
PROT SERPL-MCNC: 7.1 G/DL — SIGNIFICANT CHANGE UP (ref 6–8.3)
RBC # BLD: 4.48 M/UL — SIGNIFICANT CHANGE UP (ref 3.8–5.2)
RBC # FLD: 15.1 % — HIGH (ref 10.3–14.5)
SODIUM SERPL-SCNC: 142 MMOL/L — SIGNIFICANT CHANGE UP (ref 135–145)
VALPROATE SERPL-MCNC: 89.5 UG/ML — SIGNIFICANT CHANGE UP (ref 50–100)
WBC # BLD: 7.74 K/UL — SIGNIFICANT CHANGE UP (ref 3.8–10.5)
WBC # FLD AUTO: 7.74 K/UL — SIGNIFICANT CHANGE UP (ref 3.8–10.5)

## 2023-01-30 PROCEDURE — 99231 SBSQ HOSP IP/OBS SF/LOW 25: CPT

## 2023-01-30 PROCEDURE — 99238 HOSP IP/OBS DSCHRG MGMT 30/<: CPT

## 2023-01-30 RX ORDER — RISPERIDONE 4 MG/1
1 TABLET ORAL
Qty: 30 | Refills: 0
Start: 2023-01-30 | End: 2023-02-28

## 2023-01-30 RX ORDER — POLYETHYLENE GLYCOL 3350 17 G/17G
17 POWDER, FOR SOLUTION ORAL DAILY
Refills: 0 | Status: DISCONTINUED | OUTPATIENT
Start: 2023-01-30 | End: 2023-02-03

## 2023-01-30 RX ORDER — DIVALPROEX SODIUM 500 MG/1
2 TABLET, DELAYED RELEASE ORAL
Qty: 60 | Refills: 0
Start: 2023-01-30 | End: 2023-02-28

## 2023-01-30 RX ADMIN — RISPERIDONE 3 MILLIGRAM(S): 4 TABLET ORAL at 21:02

## 2023-01-30 RX ADMIN — SENNA PLUS 2 TABLET(S): 8.6 TABLET ORAL at 21:01

## 2023-01-30 RX ADMIN — DIVALPROEX SODIUM 1000 MILLIGRAM(S): 500 TABLET, DELAYED RELEASE ORAL at 21:01

## 2023-01-30 RX ADMIN — POLYETHYLENE GLYCOL 3350 17 GRAM(S): 17 POWDER, FOR SOLUTION ORAL at 20:02

## 2023-01-30 RX ADMIN — Medication 1 SPRAY(S): at 20:04

## 2023-01-30 NOTE — BH INPATIENT PSYCHIATRY PROGRESS NOTE - NSCGISEVERILLNESS_PSY_ALL_CORE
6 = Severely ill - disruptive pathology, behavior and function are frequently influenced by symptoms, may require assistance from others 3 = Mildly ill – clearly established symptoms with minimal, if any, distress or difficulty in social and occupational function

## 2023-01-30 NOTE — BH INPATIENT PSYCHIATRY PROGRESS NOTE - NSCGIIMPROVESX_PSY_ALL_CORE
4 = No change - symptoms remain essentially unchanged 2 = Much improved - notably better with signficant reduction of symptoms; increase in the level of functioning but some symptoms remain

## 2023-01-30 NOTE — BH INPATIENT PSYCHIATRY PROGRESS NOTE - NSBHFUPINTERVALHXFT_PSY_A_CORE
Pt assessed for sxs of paranoia and laurent. Chart reviewed and case discussed with treatment team. Staff reports pt was entitled overnight, often complaining about the food. Pt reports good sleep and appetite however. On approach, pt was laying in bed, but easily woken and cooperative with interview. Pt less paranoid about her family, remains a little evasive when asked about the "spiritual realm," less labile, much easier to redirect, improved frustration tolerance (able to accept that she will not be discharged today without much protest). Denies S/HIIP, A/VH. Pt reports constipation x 2 days - PRN miralax ordered. VPA = 89.5 on 1/30/23 and CMP WNL. Dr. Pop outreached via [employer] email with handoff - awaiting response for outpt follow up appt.  Projected discharge tomorrow  Pt assessed for sxs of paranoia and laurent. Chart reviewed and case discussed with treatment team. Staff reports pt was entitled overnight, often complaining about the food. Pt reports good sleep and appetite however. On approach, pt was laying in bed, but easily woken and cooperative with interview. Pt less paranoid about her family, remains a little evasive when asked about the "spiritual realm," less labile, much easier to redirect, improved frustration tolerance (able to accept that she will not be discharged today without much protest). Denies S/HIIP, A/VH. Pt reports constipation x 2 days - PRN miralax ordered. VPA = 89.5 on 1/30/23 and CMP WNL. Dr. Pop outreached via [employer] email with handoff and gave outpt follow up appt for 2/3/23.  Projected discharge tomorrow

## 2023-01-30 NOTE — BH INPATIENT PSYCHIATRY PROGRESS NOTE - NSBHASSESSSUMMFT_PSY_ALL_CORE
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 Pop), no prior SAs/SIB, hx of aggressive behavior (loud, breaking property), no legal hx, PMH of DM, who was brought in by son for bizarre behavior.    Today, pt less labile, no longer religiously preoccupied, fixated on discharge, improved frustration tolerance, less paranoid about her family - willing. c/w depakote 1000 mg PO QHS and risperdal 3 mg PO QHS with VPA, CMP, and CBC to be drawn on 1/30/23 - pt aware of plan to draw labs on this day. Pt declining BROWER, but did state she would think about it when encouraged and given education    PLAN:   - involuntary admission to 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 Pop), no prior SAs/SIB, hx of aggressive behavior (loud, breaking property), no legal hx, PMH of DM, who was brought in by son for bizarre behavior.    Today, pt less labile, no longer religiously preoccupied, fixated on discharge, improved frustration tolerance, less paranoid about her family - willing to be discharged to their home. c/w depakote 1000 mg PO QHS and risperdal 3 mg PO QHS with VPA = 89.5 on 1/30/23. Pt declining BROWER. Projected discharge tomorrow     PLAN:   - involuntary admission to [facility] 3 (2PC)  - routine checks (adamantly denies SIIP/HIIP)  - re-start risperdal 3 mg PO QHS (plan for BROWER)  - re-start depakote 1000 mg PO QHS  - PO/IM PRN haldol, ativan, benadryl  - saline nasal spray QHS per pt request   - draw labs for lipid panel, A1c, and urine pregnancy (pt reports menses stopped at age 41 y/o and has been celibate though)  - change diet to kosher per pt request; request nutrition consult  - encourage I/G/M therapy

## 2023-01-30 NOTE — BH INPATIENT PSYCHIATRY DISCHARGE NOTE - NSBHDCHANDOFFFT_PSY_ALL_CORE
[employer] [facility] email sent to Dr. Pop with discharge summary, medication list, and this writer's contact # [phone] for any further questions/concerns

## 2023-01-30 NOTE — BH INPATIENT PSYCHIATRY DISCHARGE NOTE - NSBHMETABOLIC_PSY_ALL_CORE_FT
BMI: BMI (kg/m2): 31.7 (01-23-23 @ 15:25)  HbA1c: A1C with Estimated Average Glucose Result: 6.1 % (01-24-23 @ 09:28)    Glucose: POCT Blood Glucose.: 134 mg/dL (01-22-23 @ 14:09)    BP: 106/60 (01-28-23 @ 19:39) (106/60 - 106/60)  Lipid Panel: Date/Time: 01-24-23 @ 09:28  Cholesterol, Serum: 222  Direct LDL: --  HDL Cholesterol, Serum: 40  Total Cholesterol/HDL Ration Measurement: --  Triglycerides, Serum: 119

## 2023-01-30 NOTE — BH INPATIENT PSYCHIATRY DISCHARGE NOTE - OTHER PAST PSYCHIATRIC HISTORY (INCLUDE DETAILS REGARDING ONSET, COURSE OF ILLNESS, INPATIENT/OUTPATIENT TREATMENT)
hx of multiple inpt admissions, most recently at [facility] in April 2021  currently in outpt treatment with Dr. Pop (though per chart review, pt last appointment in Aug '22, though has been continuing to get refills of her medications)

## 2023-01-30 NOTE — BH INPATIENT PSYCHIATRY DISCHARGE NOTE - NSDCMRMEDTOKEN_GEN_ALL_CORE_FT
divalproex sodium 500 mg oral tablet, extended release: 2 tab(s) orally once a day (at bedtime)  risperiDONE 3 mg oral tablet: 1 tab(s) orally once a day (at bedtime)   divalproex sodium 500 mg oral tablet, extended release: 2 tab(s) orally once a day (at bedtime)  risperiDONE 4 mg oral tablet: 1 tab(s) orally once a day (at bedtime)

## 2023-01-30 NOTE — BH INPATIENT PSYCHIATRY DISCHARGE NOTE - HPI (INCLUDE ILLNESS QUALITY, SEVERITY, DURATION, TIMING, CONTEXT, MODIFYING FACTORS, ASSOCIATED SIGNS AND SYMPTOMS)
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 Pop), no prior SAs/SIB, hx of aggressive behavior (loud, breaking property), no legal hx, PMH of DM, who was brought in by son for bizarre behavior.    Pt states that this morning, she was about to go rest when her son called her to tell her he was bringing her to see a therapist. She notes that her son sometimes "assumes I'm not feeling well" when referencing her depression. She states that she is undergoing a lot of stress at home with her family which causes her mood to be "so so"  though guarded with further details about this.     She gets increasingly irritable with writer when asked if she recently got into any arguments or fights with anyone, asking writer why she needed to know this information. She then denied any recent fights or arguments. She denies any SI/HI, denies any AVH. She notes that her sleep has been fair, denies any changes with her appetite.    She states that she spends her days reading, playing games on her phone, and tending to her ADLs. When asked if she was suicidal, she responded saying "no, I'm in love." Then proceeds to say she has a partner of 3 years though would not disclose further details.     She denies any periods of luarent in the past, denying grandiosity, saying "only God is on top of the world." She states that she has been taking her medications (risperidone 2 mg and depakote 1000 mg qhs) consistently though will forget once in awhile. She last picked up a prescription 2 weeks ago. She does not wish to be on medications for her life as she is unhappy that some of the side effects include changes to her blood sugars. She cannot recall when she last saw her psychiatrist, though when prompted by writer, she was able to remember seeing a provider through [facility] on Zoom in August 2022.     She denies any alcohol or illicit drug use.    Per collateral from pt's son, Sergio ([phone]):   Sergio does not live with patient so information was mainly conveyed from his cousins and aunt who currently resides with the pt. Sergio states that his cousins have noticed that she has been singing and talking to herself more and engaging in dangerous activity online (sending people money, providing pin codes). She has also been more labile, crying and getting more irritable frequently. He states that she has been "losing touch with reality." He is unsure if his mother has been adherent with her medications consistently though states that his aunt states she has not been.     *See ED Behavioral Health Note for collateral from pt's nephew, Alexander*"

## 2023-01-30 NOTE — BH INPATIENT PSYCHIATRY DISCHARGE NOTE - HOSPITAL COURSE
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 SAD, multiple prior psychiatric admissions (most recently at [facility] from 4/15/2021-4/22/2021), currently in outpatient treatment with [facility] (Dr. Pop), no prior SAs/SIB, hx of aggressive behavior (loud, breaking property), no legal hx, PMH of DM, who was brought in by son for bizarre behavior.    On the unit, pt was seen with covering SW present. Pt presents as hyperverbal, religiously preoccupied, paranoid towards family, pressured, loud, labile, irritable, with poor insight into illness and need for medications, when asked about +AH states, "It's personal" and refuses to elaborate, "let's focus on the physical realm because you will not understand the spiritual realm." Pt refuses to discuss +VH and instead states there is an "invisible spiritual armor" around her that keeps her from being harmed. "I am under a rock. You cannot hurt me. You will hit a rock!" When asked why she was brought into the hospital pt states that she is Ep[employer] and has certain dietary needs that has her separate her cookware from her W[employer] family's and this has caused a clash in the home. 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 She states that "everyone" wants to "make me pass as a crazy person" and she is here to take "crazy" off her records. When asked why anyone would want to wish her ill, pt states she does not know but people are "in on the conspiracy." Pt asked  to elaborate on the conspiracy, but is unable to do so, "Some people just get it in their heads to bother you!" When asked about ED report that she is giving away her money and ZAIRE PIN codes, pt becomes more verbally agitated and loud stating that people are trying to control her. Pt states that when she gives away money to her "friend" her family gets upset because they do not get the money instead. She states that her family had been using her for money. Pt then states that she does not eat some days because she has no money and became upset that she did not qualify for food stamps "they say I make too much money!" Attempted to reality test pt that perhaps she should not give away her money if she cannot afford food for herself and goes without eating some days, but pt was not receptive. She states that she had to quit her job and going to school for nursing because of "stress" at home. Pt asked why she takes depakote and risperdal and states this is for her depression. Pt then denies any sxs of depression. Pt endorses that she has only seen Dr. Pop via zoom and cannot remember the last time she spoke with him. Pt endorses that she sometimes "skips" her medication 1-2 times a week and became agitated when educated that medications should be taken on a consistent basis, "No, I take it when I need it. It is ok to skip it!" Pt admits to skipping several days worth of meds prior to admission, but does not feel this is why she is hospitalized. Pt does not believe she is ill and believes her son was misinformed by her sister and nephew. Pt went on to state that God answers her prayers, but when asked how this happens, pt repeated that "you will not understand. You need to pray." Pt believes God will bring her things as long as she prays for it. Denies SIIP or HIIP; citing her Uatsdin beliefs. Reports "good" sleep, unable to quantify. Reports fair energy, denies getting into arguments with people, "they argue with themselves!" Denies goal directed/risk taking behaviors. Pt refused to give HIPPA release to speak to any of her family.    Encrypted [employer] email sent to Dr. Pop - met with pt twice in August 2022, pt has not scheduled follow up appt since, refills were sent in hopes that the pt would reschedule appts, pt reported weight gain concerns but refused abilify trial, refused metformin, poor insight at baseline, frequently non-adherent to tx     PMH: DM (not on meds, managed via diet), chronic sinus infections  Allergies: PCN (itchiness, pins and needles), eggplant (hives on chest), seafood (unknown rxn - states she will not eat anything from sea unless it has scales - appears to be Uatsdin preference rather than allergy, but pt insists it is an allergy)  Substances: denies tobacco product use, "rare" use of wine (cannot remember when the last drink was), denies any other substances of abuse."    On the unit, the pt was initially loud, labile, entitled, and agitated. Pt agreed to re-start depakote 1000 mg PO QHS and risperdal 3 mg PO QHS (VPA = 89.5 on 1/30/23). Pt gradually became less labile, more easily redirected, reported less paranoia towards her family, and able to maintain fair behavioral control on the unit. Pt declined BROWER despite education provided. Pt able to verbalize the importance of taking medication consistently in the community. Pt did not give HIPPA release for her sister or niece/nephew she lives with, but did allow staff to speak with her son Sergio (who does not live with her).     Pt provided with medication education including, but not limited to, possible side effects like sedation, dizziness, change in liver function levels, weight gain, N/V, GI upset, EPS, TD, NMS, and metabolic changes; pt verbalized understanding. Pt instructed not to drive or use hazardous machinery or materials while on this medication; pt verbalized understanding. On day of discharge, pt denied SIIP, HIIP, A/VH, IOR, paranoia, thought insertion/withdrawal/broadcasting, magical thinking, special abilities, mind reading, Uatsdin preoccupation, sxs of laurent, depression, or anxiety. Pt educated on use of 911 in cases of emergency and to go to the nearest ED if feeling unsafe in the community. Pt verbalized understanding. Pt provided with numbers for Suicide Prevention and [facility] and educated on their use; pt verbalized understanding. 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 Pop), no prior SAs/SIB, hx of aggressive behavior (loud, breaking property), no legal hx, PMH of DM, who was brought in by son for bizarre behavior.    On the unit, pt was seen with covering SW present. Pt presents as hyperverbal, religiously preoccupied, paranoid towards family, pressured, loud, labile, irritable, with poor insight into illness and need for medications, when asked about +AH states, "It's personal" and refuses to elaborate, "let's focus on the physical realm because you will not understand the spiritual realm." Pt refuses to discuss +VH and instead states there is an "invisible spiritual armor" around her that keeps her from being harmed. "I am under a rock. You cannot hurt me. You will hit a rock!" When asked why she was brought into the hospital pt states that she is Buddhist and has certain dietary needs that has her separate her cookware from her Am[employer] family's and this has caused a clash in the home. 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 She states that "everyone" wants to "make me pass as a crazy person" and she is here to take "crazy" off her records. When asked why anyone would want to wish her ill, pt states she does not know but people are "in on the conspiracy." Pt asked  to elaborate on the conspiracy, but is unable to do so, "Some people just get it in their heads to bother you!" When asked about ED report that she is giving away her money and ZAIRE PIN codes, pt becomes more verbally agitated and loud stating that people are trying to control her. Pt states that when she gives away money to her "friend" her family gets upset because they do not get the money instead. She states that her family had been using her for money. Pt then states that she does not eat some days because she has no money and became upset that she did not qualify for food stamps "they say I make too much money!" Attempted to reality test pt that perhaps she should not give away her money if she cannot afford food for herself and goes without eating some days, but pt was not receptive. She states that she had to quit her job and going to school for nursing because of "stress" at home. Pt asked why she takes depakote and risperdal and states this is for her depression. Pt then denies any sxs of depression. Pt endorses that she has only seen Dr. Pop via zoom and cannot remember the last time she spoke with him. Pt endorses that she sometimes "skips" her medication 1-2 times a week and became agitated when educated that medications should be taken on a consistent basis, "No, I take it when I need it. It is ok to skip it!" Pt admits to skipping several days worth of meds prior to admission, but does not feel this is why she is hospitalized. Pt does not believe she is ill and believes her son was misinformed by her sister and nephew. Pt went on to state that God answers her prayers, but when asked how this happens, pt repeated that "you will not understand. You need to pray." Pt believes God will bring her things as long as she prays for it. Denies SIIP or HIIP; citing her Mandaeism beliefs. Reports "good" sleep, unable to quantify. Reports fair energy, denies getting into arguments with people, "they argue with themselves!" Denies goal directed/risk taking behaviors. Pt refused to give HIPPA release to speak to any of her family.    Encrypted [employer] email sent to Dr. Pop - met with pt twice in August 2022, pt has not scheduled follow up appt since, refills were sent in hopes that the pt would reschedule appts, pt reported weight gain concerns but refused abilify trial, refused metformin, poor insight at baseline, frequently non-adherent to tx     PMH: DM (not on meds, managed via diet), chronic sinus infections  Allergies: PCN (itchiness, pins and needles), eggplant (hives on chest), seafood (unknown rxn - states she will not eat anything from sea unless it has scales - appears to be Mandaeism preference rather than allergy, but pt insists it is an allergy)  Substances: denies tobacco product use, "rare" use of wine (cannot remember when the last drink was), denies any other substances of abuse."    On the unit, the pt was initially loud, labile, entitled, and agitated. Pt agreed to re-start depakote 1000 mg PO QHS and risperdal 3 mg PO QHS (VPA = 89.5 on 1/30/23). Pt gradually became less labile, more easily redirected, reported less paranoia towards her family, and able to maintain fair behavioral control on the unit. Pt declined BROWER despite education provided. Pt able to verbalize the importance of taking medication consistently in the community. Pt did not give HIPPA release for her sister or niece/nephew she lives with, but did allow staff to speak with her son Sergio (who does not live with her). Pt was initially going to be discharged on 1/31/23, however, pt's son did not feel the pt was at her baseline. Risperdal was titrated to 4 mg PO QHS and pt was less paranoid on this. Pt irritability appears to be baseline surrounding not getting the meals she wanted on the unit. Pt's case discussed with treatment team and pt was not felt to be a risk of harm to herself or others. Pt provided with medication education including, but not limited to, possible side effects like sedation, dizziness, change in liver function levels, weight gain, N/V, GI upset, EPS, TD, NMS, and metabolic changes; pt verbalized understanding. Pt instructed not to drive or use hazardous machinery or materials while on this medication; pt verbalized understanding. On day of discharge, pt denied SIIP, HIIP, A/VH, IOR, paranoia, thought insertion/withdrawal/broadcasting, magical thinking, special abilities, mind reading, Mandaeism preoccupation, sxs of laurent, depression, or anxiety. Pt educated on use of 911 in cases of emergency and to go to the nearest ED if feeling unsafe in the community. Pt verbalized understanding. Pt provided with numbers for Suicide Prevention and [place] [facility] and educated on their use; pt verbalized understanding. Pt was educated on the adverse effects these medications may have on a fetus and provided with birth control education; pt verbalized understanding    Pt was discharged with: depakote 1000 mg PO QHS (VPA = 89.5 on 1/30/23) and risperdal 4 mg PO QHS

## 2023-01-31 PROCEDURE — 99231 SBSQ HOSP IP/OBS SF/LOW 25: CPT

## 2023-01-31 RX ORDER — RISPERIDONE 4 MG/1
4 TABLET ORAL AT BEDTIME
Refills: 0 | Status: DISCONTINUED | OUTPATIENT
Start: 2023-01-31 | End: 2023-02-03

## 2023-01-31 RX ADMIN — Medication 1 SPRAY(S): at 21:12

## 2023-01-31 RX ADMIN — DIVALPROEX SODIUM 1000 MILLIGRAM(S): 500 TABLET, DELAYED RELEASE ORAL at 21:13

## 2023-01-31 RX ADMIN — RISPERIDONE 4 MILLIGRAM(S): 4 TABLET ORAL at 21:13

## 2023-01-31 RX ADMIN — SENNA PLUS 2 TABLET(S): 8.6 TABLET ORAL at 21:13

## 2023-01-31 NOTE — BH INPATIENT PSYCHIATRY PROGRESS NOTE - NSBHASSESSSUMMFT_PSY_ALL_CORE
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 Pop), no prior SAs/SIB, hx of aggressive behavior (loud, breaking property), no legal hx, PMH of DM, who was brought in by son for bizarre behavior.    Today, pt less labile, no longer religiously preoccupied, fixated on discharge, improved frustration tolerance, less paranoid about her family - willing to be discharged to their home. Spoke to pt's son who did not feel pt was at baseline and advocated for continued hospitalization. c/w depakote 1000 mg PO QHS. Increase risperdal to 4mg PO QHS with VPA = 89.5 on 1/30/23. Pt declining BROWER. Projected discharge 2/3/23     PLAN:   - [facility]  - routine checks (adamantly denies SIIP/HIIP)  - increase risperdal to 4 mg PO QHS (plan for BROWER)  - re-start depakote 1000 mg PO QHS  - PO/IM PRN haldol, ativan, benadryl  - saline nasal spray QHS per pt request   - draw labs for lipid panel, A1c, and urine pregnancy (pt reports menses stopped at age 41 y/o and has been celibate though)  - change diet to kosher per pt request; request nutrition consult  - encourage I/G/M therapy

## 2023-01-31 NOTE — BH INPATIENT PSYCHIATRY PROGRESS NOTE - NSBHFUPINTERVALHXFT_PSY_A_CORE
Pt assessed for sxs of paranoia and laurent. Chart reviewed and case discussed with treatment team. Staff reports pt was entitled overnight, often complaining about the food, labile, irritable. Pt reports good sleep and appetite however. Spoke to pt's son Sergio (HIPPA in chart) - he stated that during his visit with the pt overnight, pt was not at baseline, was "giddy" asking him what his last name was on his birth certificate which he thought was strange, repeating phrases, speaking to herself at times during their conversation, ranting that he thought she was "carzy," but no longer as paranoid about her family. He advocated for continued hospitalization. Pt seen with SW present. On approach, pt was laying in bed, but easily woken and cooperative with interview. Pt less paranoid about her family, remains a little evasive when asked about the "spiritual realm," less labile, much easier to redirect, improved frustration tolerance (able to accept that she will not be discharged today without becoming too upset), was able to understand why she would need to be observed for increase in medication dosage. Discussed medications and pt agreeable to further increase in risperdal to 4 mg PO QHS to address residual sxs. Discussed pt's A1c = 6.1 at length, pt refusing to start metformin. Medicine consulted, per Dr. Angi WATTS, generally no FS monitoring for A1c of 6.1 and pt educated on watching her diet. Pt verbalized understanding. Denies S/HIIP, A/VH. VPA = 89.5 on 1/30/23 and CMP WNL. Projected discharge on 2/3/23

## 2023-02-01 PROCEDURE — 99231 SBSQ HOSP IP/OBS SF/LOW 25: CPT

## 2023-02-01 RX ORDER — PANTOPRAZOLE SODIUM 20 MG/1
40 TABLET, DELAYED RELEASE ORAL
Refills: 0 | Status: DISCONTINUED | OUTPATIENT
Start: 2023-02-01 | End: 2023-02-03

## 2023-02-01 RX ADMIN — RISPERIDONE 4 MILLIGRAM(S): 4 TABLET ORAL at 21:02

## 2023-02-01 RX ADMIN — Medication 1 SPRAY(S): at 21:03

## 2023-02-01 RX ADMIN — SENNA PLUS 2 TABLET(S): 8.6 TABLET ORAL at 21:02

## 2023-02-01 RX ADMIN — DIVALPROEX SODIUM 1000 MILLIGRAM(S): 500 TABLET, DELAYED RELEASE ORAL at 21:02

## 2023-02-01 NOTE — BH INPATIENT PSYCHIATRY PROGRESS NOTE - NSBHFUPINTERVALHXFT_PSY_A_CORE
Pt assessed for sxs of paranoia and laurent. Chart reviewed and case discussed with treatment team. Staff reports pt was entitled overnight, often complaining about the food, labile, irritable. Pt reports good sleep. States that she complains about the food because they give her the same lunch and dinner and it gives her "acid." Pt agreed to start protonix 40 mg PO QD and nutrition consult requested to try and accommodate pt's requests. Pt requesting ensure - ordered. Pt was able to rationalize her interaction with her son, stating that she asked for his last name because her 2 daughters no longer use her ex-'s last name and she wanted to clarify that her son is carrying on the name. Pt seen with SW present. On approach, pt was laying in bed, but easily woken and cooperative with interview. Pt less paranoid about her family stating that she gets along with them, but they have different personalities and she sometimes does not agree with them; less labile, much easier to redirect, improved frustration tolerance. Pt agreeable to c/w further increase in risperdal to 4 mg PO QHS and denied SE. Pt more insightful and verbalized that she understands she needs to take meds consistently in the community. Denies S/HIIP, A/VH. VPA = 89.5 on 1/30/23. Projected discharge on 2/3/23

## 2023-02-01 NOTE — BH INPATIENT PSYCHIATRY PROGRESS NOTE - NSBHASSESSSUMMFT_PSY_ALL_CORE
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 Pop), no prior SAs/SIB, hx of aggressive behavior (loud, breaking property), no legal hx, PMH of DM, who was brought in by son for bizarre behavior.    Today, pt less labile, no longer religiously preoccupied, fixated on discharge, improved frustration tolerance, less paranoid about her family - willing to be discharged to their home. c/w depakote 1000 mg PO QHS and Increase in risperdal to 4mg PO QHS with VPA = 89.5 on 1/30/23. Pt declining BROWER. Projected discharge 2/3/23     PLAN:   - involuntary admission to [facility] (2P)  - routine checks (adamantly denies SIIP/HIIP)  - increase risperdal to 4 mg PO QHS (plan for BROWER)  - re-start depakote 1000 mg PO QHS  - PO/IM PRN haldol, ativan, benadryl  - saline nasal spray QHS per pt request   - draw labs for lipid panel, A1c, and urine pregnancy (pt reports menses stopped at age 43 y/o and has been celibate though)  - change diet to kosher per pt request; request nutrition consult  - encourage I/G/M therapy

## 2023-02-01 NOTE — BH INPATIENT PSYCHIATRY PROGRESS NOTE - NSCGIIMPROVESXSCORE_CAL_PSY_ALL_CORE
nurse called pt's daughter to confirm her palliative visit for tomorrow, daughter request r/s to next week when she is off to bring her in. Apt was r/s to come in with tatum to discuss not being able to sleep, nurse asked pt's daughter how was she feeling today, she stated a little better, seem he n/v stop with pt getting off one of her pills. These things will be discussed at time of visit per ms hamilton's daughter ms wiggins.   3

## 2023-02-02 PROCEDURE — 99231 SBSQ HOSP IP/OBS SF/LOW 25: CPT

## 2023-02-02 RX ORDER — RISPERIDONE 4 MG/1
1 TABLET ORAL
Qty: 30 | Refills: 0
Start: 2023-02-02 | End: 2023-03-03

## 2023-02-02 RX ADMIN — POLYETHYLENE GLYCOL 3350 17 GRAM(S): 17 POWDER, FOR SOLUTION ORAL at 17:32

## 2023-02-02 RX ADMIN — RISPERIDONE 4 MILLIGRAM(S): 4 TABLET ORAL at 20:17

## 2023-02-02 RX ADMIN — DIVALPROEX SODIUM 1000 MILLIGRAM(S): 500 TABLET, DELAYED RELEASE ORAL at 20:16

## 2023-02-02 NOTE — BH INPATIENT PSYCHIATRY PROGRESS NOTE - NSBHFUPINTERVALHXFT_PSY_A_CORE
Pt assessed for sxs of paranoia and laurent. Chart reviewed and case discussed with treatment team. Staff reports pt was complaining about the food and irritable, but in behavioral control. Pt reports good sleep. States that she complains about the food because they give her the same lunch and dinner and it gives her "acid." Pt refused protonix this AM; nutrition consult requested yesterday - pending recs. Pt endorses drinking ensure overnight. On approach, pt was laying in bed, but easily woken and cooperative with interview. Pt denies paranoia about her family, not labile with this writer, denies SIIP, HIIP, A/VH. Pt agreeable to c/w further increase in risperdal to 4 mg PO QHS and denied SE. Pt more insightful and verbalized that she understands she needs to take meds consistently in the community. Pt refusing BROWER at this time. Denies S/HIIP, A/VH. VPA = 89.5 on 1/30/23. Projected discharge on 2/3/23

## 2023-02-02 NOTE — BH TREATMENT PLAN - NSTXMEDICINTERPR_PSY_ALL_CORE
Pt making slight improvements as she has been able to engage daily in medication compliance with good effect. Pt remains semi isolative to personal room and sleeping throughout some of the day. Psych rehab staff will continue to support the pt towards goal progress during the current stay.
Over the next 7 days, psych rehab staff will support and encourage the pt towards goal progress

## 2023-02-02 NOTE — BH TREATMENT PLAN - NSTXPLANTHERAPYSESSIONSFT_PSY_ALL_CORE
01-31-23  Type of therapy: Coping skills,Leisure development  Type of session: Individual  Level of patient participation: pt sleeping   Duration of participation: pt sleeping  Therapy conducted by: Psych rehab  Therapy Summary: Writer attempted to meet with the pt on a 1:1 basis to discuss and assess the pt's progress towards specified psychiatric rehabilitation goals this week. However, on approach, the pt was sleeping and unable to be roused. Therefore, the following information is based on the pt's chart, and collateral obtained by attending treatment team members.     This week, the pt is making slight improvements towards specified psych rehab goal of being able to identify the benefit of daily medication compliance as she has been able to engage daily in medication compliance with good effect. Pt remains semi isolative to personal room and sleeping throughout some of the day. Per collateral obtained by attending treatment team, the pt's son does not feel that the pt is presenting at baseline. Per treatment team, the pt remains irritable with poor frustration tolerance. Pt continues to present in an entitled manner, and pt's son reported to the treatment team that the pt seemed giddy and elevated.    Pt is intermittently visible within the community. Pt no longer presents with paranoid delusions surrounding the family. Pt has been in good behavioral control. Per chart, the pt denies SI/HI/I/P and AH/VH/TH.     Psych rehab staff will continue to support the pt towards goal progress during the current stay.

## 2023-02-02 NOTE — BH TREATMENT PLAN - NSDCCRITERIA_PSY_ALL_CORE
CGI <=2, return to baseline functioning as evidenced by behavioral control, staff observations, pt self report 
CGI <=2, return to baseline functioning as evidenced by behavioral control, staff observations, pt self report

## 2023-02-02 NOTE — CHART NOTE - NSCHARTNOTEFT_GEN_A_CORE
Nutrition re-consult for food request/preferences. Patient is a 57 year old female with PPH of reported bipolar disorder vs. SAD, multiple prior psychiatric admissions (most recently at [facility] from 4/15/2021-4/22/2021), hx of aggressive behavior (loud, breaking property), PMH of DM, who was brought in by son for bizarre behavior.    Met with patient in her room today. Patient reports her appetite is good but with PO intake </=50% at meals due to gastric reflux and nausea s/p having Kosher falalfel plate. Patient would like to have other Kosher meal options. Menu options explored and reviewed with patient today, new food preferences taken and honored on CBoard -- patient would like to have Kosher tuna wrap or flounder at lunch; and veggie wrap at dinner. Will also send vegetable platter and fruit platter to patient. Communicated food preferences w/ diet manager and faxed request form to diet [employer]. No new food allergies reported. Denies chewing/swallowing difficulties on current diet. Patient c/o nausea 2/2 gastric reflux but denies vomit. On PPI per MD, but noted pt refused protonix this AM. Denies C/D. Patient reports she has been taking Ensure supplement and prefers to have it over Glucerna Shake despite knowing Glucerna Shake has less total carbohydrate/sugar and suitable for diabetic patients. Writer encouraged po intake as tolerated, patient verbalized understanding.    Diet : Diet, Regular:   Consistent Carbohydrate {Evening Snack} (CSTCHOSN)  DASH/TLC {Sodium & Cholesterol Restricted} (DASH)  Kosher  Supplement Feeding Modality:  Oral  Ensure Enlive Cans or Servings Per Day:  3       Frequency:  Three Times a day (02-01-23 @ 10:49)    Current Weight: Height (cm): 160 (01-23-23)  Weight (kg): 76.2kg   BMI (kg/m2): 29.8kg/m^2    Skin: no pressure injuries     Edema: no edema    Pertinent Medications: MEDICATIONS  (STANDING):  divalproex ER 1000 milliGRAM(s) Oral at bedtime  hemorrhoidal Ointment 1 Application(s) Rectal once  pantoprazole    Tablet 40 milliGRAM(s) Oral before breakfast  risperiDONE   Tablet 4 milliGRAM(s) Oral at bedtime  senna 2 Tablet(s) Oral at bedtime  sodium chloride 0.65% Nasal 1 Spray(s) Both Nostrils at bedtime    MEDICATIONS  (PRN):  acetaminophen     Tablet .. 650 milliGRAM(s) Oral every 6 hours PRN Temp greater or equal to 38C (100.4F), Mild Pain (1 - 3), Moderate Pain (4 - 6)  aluminum hydroxide/magnesium hydroxide/simethicone Suspension 30 milliLiter(s) Oral every 6 hours PRN Dyspepsia  diphenhydrAMINE 50 milliGRAM(s) Oral every 6 hours PRN Extrapyramidal prophylaxis  diphenhydrAMINE Injectable 50 milliGRAM(s) IntraMuscular once PRN Extrapyramidal prophylaxis  haloperidol     Tablet 5 milliGRAM(s) Oral every 6 hours PRN agitation  haloperidol    Injectable 5 milliGRAM(s) IntraMuscular once PRN agitation  LORazepam     Tablet 2 milliGRAM(s) Oral every 6 hours PRN Anxiety  LORazepam   Injectable 2 milliGRAM(s) IntraMuscular once PRN Agitation  polyethylene glycol 3350 17 Gram(s) Oral daily PRN constipation    Pertinent Labs:   01-30 Alb 3.7 g/dL Glucose 103  01-24 Chol 222 mg/dL<H> LDL --    HDL 40 mg/dL<L> Trig 119 mg/dL  HbA1c: A1C with Estimated Average Glucose Result: 6.1 % (01-24-23 @ 09:28)      Estimated Needs:   [X] no change since previous assessment  [ ] recalculated:       Previous Nutrition Diagnosis: Altered Nutrition Related Lab Values    Nutrition Diagnosis is [X] ongoing  [ ] resolved [ ] not applicable        New Nutrition Diagnosis: Inadequate Oral Intake      Related to: nausea 2/2 gastric reflux, food preferences     As evidenced by: PO intake </=50% at meals; currently with Ensure supplement in place for additional kcal/protein provision     Goal: Tolerate PO intake to meet >75% of estimated needs       Recommendations:  1. Continue current diet order per MD.   2. Encourage po intake as tolerated and honor food preferences PRN.  3. Antiacid rx per MD order.  4. Monitor PO intake/tolerance, weights, labs/glucose/lipids, GI status, and skin integrity.     -- Shruthi Buenrostro, MS, RDN, Pager # [phone] Nutrition re-consult for food request/preferences. Patient is a 57 year old female with PPH of reported bipolar disorder vs. SAD, multiple prior psychiatric admissions (most recently at [facility] from 4/15/2021-4/22/2021), hx of aggressive behavior (loud, breaking property), PMH of DM, who was brought in by son for bizarre behavior.    Met with patient in her room today. Patient reports her appetite is good but with PO intake </=50% at meals due to gastric reflux and nausea s/p having Kosher falalfel plate. Patient would like to have other Kosher meal options. Menu options explored and reviewed with patient today, new food preferences taken and honored on CBoard -- patient would like to have Kosher tuna wrap at lunch; and Kosher meatloaf at dinner. Will also send vegetable platter and fruit platter to patient. Communicated food preferences w/ diet manager and faxed request form to diet tech. No new food allergies reported. Denies chewing/swallowing difficulties on current diet. Patient c/o nausea 2/2 gastric reflux but denies vomit. On PPI per MD, but noted pt refused protonix this AM. Denies C/D. Patient reports she has been taking Ensure supplement and prefers to have it over Glucerna Shake despite knowing Glucerna Shake has less total carbohydrate/sugar and suitable for diabetic patients. Writer encouraged po intake as tolerated, patient verbalized understanding.    Diet : Diet, Regular:   Consistent Carbohydrate {Evening Snack} (CSTCHOSN)  DASH/TLC {Sodium & Cholesterol Restricted} (DASH)  Kosher  Supplement Feeding Modality:  Oral  Ensure Enlive Cans or Servings Per Day:  3       Frequency:  Three Times a day (02-01-23 @ 10:49)    Current Weight: Height (cm): 160 (01-23-23)  Weight (kg): 76.2kg   BMI (kg/m2): 29.8kg/m^2    Skin: no pressure injuries     Edema: no edema    Pertinent Medications: MEDICATIONS  (STANDING):  divalproex ER 1000 milliGRAM(s) Oral at bedtime  hemorrhoidal Ointment 1 Application(s) Rectal once  pantoprazole    Tablet 40 milliGRAM(s) Oral before breakfast  risperiDONE   Tablet 4 milliGRAM(s) Oral at bedtime  senna 2 Tablet(s) Oral at bedtime  sodium chloride 0.65% Nasal 1 Spray(s) Both Nostrils at bedtime    MEDICATIONS  (PRN):  acetaminophen     Tablet .. 650 milliGRAM(s) Oral every 6 hours PRN Temp greater or equal to 38C (100.4F), Mild Pain (1 - 3), Moderate Pain (4 - 6)  aluminum hydroxide/magnesium hydroxide/simethicone Suspension 30 milliLiter(s) Oral every 6 hours PRN Dyspepsia  diphenhydrAMINE 50 milliGRAM(s) Oral every 6 hours PRN Extrapyramidal prophylaxis  diphenhydrAMINE Injectable 50 milliGRAM(s) IntraMuscular once PRN Extrapyramidal prophylaxis  haloperidol     Tablet 5 milliGRAM(s) Oral every 6 hours PRN agitation  haloperidol    Injectable 5 milliGRAM(s) IntraMuscular once PRN agitation  LORazepam     Tablet 2 milliGRAM(s) Oral every 6 hours PRN Anxiety  LORazepam   Injectable 2 milliGRAM(s) IntraMuscular once PRN Agitation  polyethylene glycol 3350 17 Gram(s) Oral daily PRN constipation    Pertinent Labs:   01-30 Alb 3.7 g/dL Glucose 103  01-24 Chol 222 mg/dL<H> LDL --    HDL 40 mg/dL<L> Trig 119 mg/dL  HbA1c: A1C with Estimated Average Glucose Result: 6.1 % (01-24-23 @ 09:28)      Estimated Needs:   [X] no change since previous assessment  [ ] recalculated:       Previous Nutrition Diagnosis: Altered Nutrition Related Lab Values    Nutrition Diagnosis is [X] ongoing  [ ] resolved [ ] not applicable        New Nutrition Diagnosis: Inadequate Oral Intake      Related to: nausea 2/2 gastric reflux, food preferences     As evidenced by: PO intake </=50% at meals; currently with Ensure supplement in place for additional kcal/protein provision     Goal: Tolerate PO intake to meet >75% of estimated needs       Recommendations:  1. Continue current diet order per MD.   2. Encourage po intake as tolerated and honor food preferences PRN.  3. Antiacid rx per MD order.  4. Monitor PO intake/tolerance, weights, labs/glucose/lipids, GI status, and skin integrity.     -- Shruthi Buenrostro, MS, RDN, Pager # [phone]

## 2023-02-02 NOTE — BH TREATMENT PLAN - NSTXDCOTHRINTERSW_PSY_ALL_CORE
ongoing support, psychoed and encouragement provided in effort to comply with meds, tx and discharge plans.
SW will provide psychoeducation, support, encourage tx./ med. compliance and coordinate discharge planning. Collateral/family supports to be contacted accordingly.

## 2023-02-02 NOTE — BH TREATMENT PLAN - NSTXPROBDISORG_PSY_ALL_CORE
Cardiology Follow Up    Pearl Hobson  1938  [id number]  [facility]  [facility]  [address]  [phone]    1  Non-rheumatic mitral regurgitation     2  Coronary artery disease involving native coronary artery of native heart without angina pectoris     3  Essential hypertension     4  Chronic combined systolic and diastolic congestive heart failure ([facility] [address] )     5  Aortic valve regurgitation, nonrheumatic         Interval History:  Cardiology follow-up  Patient doing overall well from a cardiac point of view denies any chest pain, she does have mild dyspnea class 1  She is active at home she intends to restart cardiac rehabilitation  Denies any bleeding issues on chronic dual antiplatelet therapy plus low-dose factor Xa inhibitor     No admissions for congestive failure  She did lose some weight 2 lb with diet  Lipids last year total cholesterol 126, HDL 56, LDL 56, adequate control on high-intensity statin therapy  Patient is on Ranexa therapy as well, no admissions for congestive failure, she is on low-dose diuretic 40 mg furosemide b i d   Kidney stable chronic kidney disease, creatinine 1 3 GFR in the 30s      Patient Active Problem List   Diagnosis    Aortic valve regurgitation, nonrheumatic    Benign hypertension with CKD (chronic kidney disease) stage III    Chronic rhinitis    Midline cystocele    Controlled type 2 diabetes mellitus with neurologic complication, without long-term current use of insulin ([facility])    Non-rheumatic mitral regurgitation    Uterine procidentia    Anemia    Esophageal abnormality    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
Acute diastolic congestive heart failure (HCC)    Status post insertion of drug-eluting stent into left anterior descending (LAD) artery    Coronary artery disease involving native coronary artery of native heart without angina pectoris    NSTEMI (non-ST elevated myocardial infarction) ([facility] )    Essential hypertension    Asthma    Combined systolic and diastolic congestive heart failure ([facility] )    Uncontrolled type 2 diabetes mellitus with hyperglycemia (HCC)    Urge urinary incontinence    Nocturia     Past Medical History:   Diagnosis Date    ACE-inhibitor cough     last assessed - [id number]    Asthma     Bilateral edema of lower extremity     last assessed - [id number]    Cardiac murmur     last assessed - [id number]    CKD (chronic kidney disease)     Diabetes mellitus ([facility] )     last assessed - [id number]    Esophageal dysphagia     Fatigue     last assessed - [id number]    Hyperlipidemia     Hypertension     Patellar bursitis of right knee     last assessed - [id number]    Personal history of other specified conditions     presenting hazards to health    Stroke [facility])     Stroke syndrome     Urinary frequency     UTI (urinary tract infection)      Social History     Socioeconomic History    Marital status:       Spouse name: Not on file    Number of children: 6    Years of education: Not on file    Highest education level: Not on file   Occupational History    Occupation: Retired   Tobacco Use    Smoking status: Former Smoker     Packs/day: 3 00     Quit date:      Years since quittin 2    Smokeless tobacco: Former User    Tobacco comment: quit over 30 yrs ago; (quit in the , had smoked 3PPD for 20 years - per [employer])   Vaping Use    Vaping Use: Never used   Substance and Sexual Activity    Alcohol use: Never    Drug use: Never    Sexual activity: Not Currently   Other Topics Concern    Not on file   Social History Narrative    Diet needs improvement    Does
DISORGANIZATION OF THOUGHT/BEHAVIOR
not exercise    No caffeine use     Social Determinants of Health     Financial Resource Strain: Low Risk     Difficulty of Paying Living Expenses: Not hard at all   Food Insecurity: No Food Insecurity    Worried About Running Out of Food in the Last Year: Never true    Bjorn of Food in the Last Year: Never true   Transportation Needs: No Transportation Needs    Lack of Transportation (Medical): No    Lack of Transportation (Non-Medical): No   Physical Activity: Inactive    Days of Exercise per Week: 0 days    Minutes of Exercise per Session: 0 min   Stress: No Stress Concern Present    Feeling of Stress : Not at all   Social Connections: Socially Isolated    Frequency of Communication with Friends and Family: Once a week    Frequency of Social Gatherings with Friends and Family: Once a week    Attends [employer] Services: 1 to 4 times per year    Active Member of [employer] or Organizations: No    Attends Club or Organization Meetings: Never    Marital Status:     Intimate Partner Violence: Not At Risk    Fear of Current or Ex-Partner: No    Emotionally Abused: No    Physically Abused: No    Sexually Abused: No   Housing Stability: Not on file      Family History   Problem Relation Age of Onset    Heart disease Father     Hypertension Mother     Stroke Mother     Other Sister         1)Breast disorder; 2)Epilepsy   [facility] Migraines Sister         Migraine headaches    Osteoporosis Sister     Thyroid disease Sister     Coronary artery disease Sister         S/P triple vessel bypass    Osteoporosis Maternal Grandmother     Diabetes Son         Diabetes mellitus    Hypertension Son     Rheum arthritis Son         Rheumatic disease    Heart disease Family     No Known Problems Maternal Grandfather     No Known Problems Paternal Grandmother     No Known Problems Paternal Grandfather     Breast cancer Sister [de-identified]    No Known Problems Sister     No Known Problems Sister     No Known
Problems Maternal Aunt     No Known Problems Maternal Aunt     No Known Problems Maternal Aunt     No Known Problems Paternal Aunt     No Known Problems Paternal Aunt     No Known Problems Son      Past Surgical History:   Procedure Laterality Date    MT ESOPHAGOGASTRODUODENOSCOPY TRANSORAL DIAGNOSTIC N/A 4/5/2017    Procedure: ESOPHAGOGASTRODUODENOSCOPY (EGD); Surgeon: Steven Jovel MD;  Location: [facility];   Service: Gastroenterology       Current Outpatient Medications:     acetaminophen (TYLENOL) 650 mg CR tablet, Take 650 mg by mouth every 6 (six) hours as needed for mild pain, Disp: , Rfl:     albuterol (PROVENTIL HFA,VENTOLIN HFA) 90 mcg/act inhaler, INHALE 2 PUFFS EVERY 4 (FOUR) HOURS AS NEEDED FOR WHEEZING, Disp: 8 5 g, Rfl: 5    aspirin (ECOTRIN LOW STRENGTH) 81 mg EC tablet, Take 1 tablet (81 mg total) by mouth daily, Disp: 90 tablet, Rfl: 0    atorvastatin (LIPITOR) 40 mg tablet, TAKE 1 TABLET (40 MG TOTAL) BY MOUTH DAILY, Disp: 90 tablet, Rfl: 1    Blood Glucose Monitoring Suppl (FREESTYLE LITE) JAQUELIN, by Does not apply route daily, Disp: 1 each, Rfl: 0    Breo Ellipta 100-25 MCG/INH inhaler, Inhale 1 puff daily, Disp: 60 blister, Rfl: 5    carvedilol (COREG) 6 25 mg tablet, TAKE 1 TABLET (6 25 MG TOTAL) BY MOUTH 2 (TWO) TIMES A DAY WITH MEALS, Disp: 60 tablet, Rfl: 5    Cholecalciferol (D3) 50 MCG (2000 UT) TABS, Take 2 tablets (4,000 Units total) by mouth daily, Disp: 180 tablet, Rfl: 0    clopidogrel (PLAVIX) 75 mg tablet, TAKE 2 TABS DAILY, Disp: 180 tablet, Rfl: 3    conjugated estrogens (PREMARIN) vaginal cream, Insert 0 5 g into the vagina 2 (two) times a week Insert twice weekly at bedtime, Disp: 30 g, Rfl: 1    Continuous Blood Gluc Sensor (1DocWayyle Naldo 14 Day Sensor) MISC, USE ONE SENSOR EVERY 14 DAYS, Disp: 4 each, Rfl: 3    ferrous sulfate 324 (65 Fe) mg, Take 1 tablet (324 mg total) by mouth 2 (two) times a day before meals, Disp: 60 tablet, Rfl: 2    furosemide
(LASIX) 40 mg tablet, TAKE 1 TABLET (40 MG TOTAL) BY MOUTH 2 (TWO) TIMES A DAY, Disp: 180 tablet, Rfl: 3    glucose blood (FREESTYLE LITE) test strip, TEST AS DIRECTED UP TO FOUR TIMES A DAY, Disp: 100 each, Rfl: 3    Lancets (FREESTYLE) lancets, Use as instructed, Disp: 100 each, Rfl: 0    latanoprost (XALATAN) 0 005 % ophthalmic solution, , Disp: , Rfl:     losartan (COZAAR) 50 mg tablet, TAKE 1 TAB DAILY, Disp: 90 tablet, Rfl: 1    metFORMIN (GLUCOPHAGE) 500 mg tablet, TAKE 1 TABLET (500 MG TOTAL) BY MOUTH 2 (TWO) TIMES A DAY WITH MEALS, Disp: 180 tablet, Rfl: 0    Misc   Devices (QUAD CANE) MISC, by Does not apply route daily, Disp: 1 each, Rfl: 0    montelukast (SINGULAIR) 10 mg tablet, TAKE 1 TABLET (10 MG TOTAL) BY MOUTH DAILY AT BEDTIME, Disp: 90 tablet, Rfl: 3    nitroglycerin (NITROSTAT) 0 4 mg SL tablet, PLACE 1 TABLET (0 4 MG TOTAL) UNDER THE TONGUE EVERY 5 (FIVE) MINUTES AS NEEDED FOR CHEST PAIN, Disp: 25 tablet, Rfl: 1    Tradjenta 5 MG TABS, TAKE 1 TABLET (5 MG) BY MOUTH DAILY, Disp: 90 tablet, Rfl: 1    Xarelto 2 5 MG tablet, TAKE 1 TABLET (2 5 MG TOTAL) BY MOUTH 2 (TWO) TIMES A DAY WITH MEALS, Disp: 180 tablet, Rfl: 1    Glucose-Vitamin C-Vitamin D (TRUEPLUS GLUCOSE ON THE GO) CHEW, CHEW 2 TABS AS NEEDED FOR BLOOD SUGAR LESS THAN 80 (Patient not taking: Reported on 11/22/2021), Disp: , Rfl:     ranolazine (RANEXA) 500 mg 12 hr tablet, Take 1 tablet (500 mg total) by mouth every 12 (twelve) hours (Patient not taking: Reported on 11/22/2021 ), Disp: 60 tablet, Rfl: 0  No Known Allergies    Labs:  Lab on 03/18/2022   Component Date Value    Hemoglobin A1C 03/18/2022 7 8*    EAG 03/18/2022 177     Sodium 03/18/2022 141     Potassium 03/18/2022 3 8     Chloride 03/18/2022 108     CO2 03/18/2022 28     ANION GAP 03/18/2022 5     BUN 03/18/2022 38*    Creatinine 03/18/2022 1 37*    Glucose, Fasting 03/18/2022 180*    Calcium 03/18/2022 9 5     eGFR 03/18/2022 36    Lab on 11/29/2021
Component Date Value    Sodium 11/29/2021 141     Potassium 11/29/2021 3 7     Chloride 11/29/2021 107     CO2 11/29/2021 25     ANION GAP 11/29/2021 9     BUN 11/29/2021 44*    Creatinine 11/29/2021 1 45*    Glucose, Fasting 11/29/2021 201*    Calcium 11/29/2021 9 4     eGFR 11/29/2021 39     WBC 11/29/2021 4 51     RBC 11/29/2021 4 05     Hemoglobin 11/29/2021 10 2*    Hematocrit 11/29/2021 33 2*    MCV 11/29/2021 82     MCH 11/29/2021 25 2*    MCHC 11/29/2021 30 7*    RDW 11/29/2021 14 3     Platelets 32/91/2394 193     MPV 11/29/2021 12 5     Phosphorus 11/29/2021 3 6     PTH 11/29/2021 166 4*    Hemoglobin A1C 11/29/2021 8 3*    EAG 11/29/2021 192    Appointment on 11/02/2021   Component Date Value    Sodium 11/02/2021 140     Potassium 11/02/2021 3 7     Chloride 11/02/2021 107     CO2 11/02/2021 30     ANION GAP 11/02/2021 3*    BUN 11/02/2021 52*    Creatinine 11/02/2021 1 49*    Glucose, Fasting 11/02/2021 161*    Calcium 11/02/2021 9 8     AST 11/02/2021 16     ALT 11/02/2021 33     Alkaline Phosphatase 11/02/2021 71     Total Protein 11/02/2021 8 1     Albumin 11/02/2021 3 7     Total Bilirubin 11/02/2021 0 44     eGFR 11/02/2021 38     Cholesterol 11/02/2021 126     Triglycerides 11/02/2021 70     HDL, Direct 11/02/2021 56     LDL Calculated 11/02/2021 56      Imaging: No results found  Review of Systems:  Review of Systems   Constitutional: Negative for activity change and fatigue  HENT: Negative for nosebleeds  Respiratory: Positive for shortness of breath  Negative for apnea, wheezing and stridor  Cardiovascular: Negative for chest pain, palpitations and leg swelling  Gastrointestinal: Negative for abdominal pain, anal bleeding and blood in stool  Endocrine: Negative for cold intolerance  Genitourinary: Negative for hematuria  Musculoskeletal: Positive for arthralgias and gait problem  Negative for myalgias     Skin: Negative for pallor
and rash  Allergic/Immunologic: Negative for immunocompromised state  Neurological: Negative for syncope and weakness  Hematological: Bruises/bleeds easily  Psychiatric/Behavioral: Negative for sleep disturbance  The patient is not nervous/anxious  Physical Exam:  Physical Exam  Vitals reviewed  Constitutional:       General: She is not in acute distress  Appearance: She is well-developed  She is obese  She is not ill-appearing, toxic-appearing or diaphoretic  Neck:      Vascular: No JVD  Trachea: No tracheal deviation  Cardiovascular:      Rate and Rhythm: Normal rate and regular rhythm  No extrasystoles are present  Pulses: Normal pulses  No decreased pulses  Heart sounds: Normal heart sounds  No murmur heard  No friction rub  No gallop  Pulmonary:      Effort: Pulmonary effort is normal  No tachypnea, bradypnea, accessory muscle usage or respiratory distress  Breath sounds: Normal breath sounds  No stridor  No decreased breath sounds, wheezing, rhonchi or rales  Musculoskeletal:      Right lower leg: No edema  Left lower leg: No edema  Skin:     General: Skin is warm and dry  Capillary Refill: Capillary refill takes less than 2 seconds  Coloration: Skin is not cyanotic or pale  Findings: No ecchymosis or erythema  Nails: There is no clubbing  Neurological:      Mental Status: She is alert and oriented to person, place, and time  Psychiatric:         Mood and Affect: Mood normal          Discussion/Summary:  Coronary disease, complex  Non STEMI on 03/21 PTCA drug stent of the LAD and PTCA of the distal LAD diffusely diseased vessels  Unstable angina pectoris on 521, catheterization revealed occluded LAD stent  Unsuccessful PTCA attempt with severe residual disease  She was turned down by CT surgery at that time because of poor targets    An echocardiogram revealed low normal LV function with ejection fraction 50-55% with apical
akinesis  Stage II diastolic dysfunction, there was moderate mitral insufficiency mild insufficiency and atrial septal aneurysm noted without any shunts  This note was completed in part utilizing [employer] fluency direct voice recognition software  Grammatical errors, random word insertion, spelling mistakes, and incomplete sentences may be an occasional consequence of the system secondary to software limitations, ambient noise and hardware issues  At the time of dictation, efforts were made to edit, clarify and /or correct errors  Please read the chart carefully and recognize, using context, where substitutions have occurred  If you have any questions or concerns about the context, text or information contained within the body of this dictation, please contact myself, the provider, for further clarification 
DISORGANIZATION OF THOUGHT/BEHAVIOR

## 2023-02-02 NOTE — BH TREATMENT PLAN - NSTXDCOTHRGOAL_PSY_ALL_CORE
Patient will comply with treatment recommendations within seven days.
Patient will comply with treatment recommendations within seven days.

## 2023-02-02 NOTE — BH INPATIENT PSYCHIATRY PROGRESS NOTE - NSBHASSESSSUMMFT_PSY_ALL_CORE
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 SAD, multiple prior psychiatric admissions (most recently at [facility] from 4/15/2021-4/22/2021), currently in outpatient treatment with [facility] (Dr. Pop), no prior SAs/SIB, hx of aggressive behavior (loud, breaking property), no legal hx, PMH of DM, who was brought in by son for bizarre behavior.    Today, pt no longer labile with this writer, no longer religiously preoccupied, still fixated on discharge, improved frustration tolerance, denies paranoia about her family - willing to be discharged to their home. c/w depakote 1000 mg PO QHS and Increase in risperdal to 4mg PO QHS with VPA = 89.5 on 1/30/23. Pt declining BROWER. Denies SE. Projected discharge 2/3/23     PLAN:   - involuntary admission to [facility] (2P)  - routine checks (adamantly denies SIIP/HIIP)  - increase risperdal to 4 mg PO QHS (plan for BROWER)  - re-start depakote 1000 mg PO QHS  - PO/IM PRN haldol, ativan, benadryl  - saline nasal spray QHS per pt request   - draw labs for lipid panel, A1c, and urine pregnancy (pt reports menses stopped at age 41 y/o and has been celibate though)  - change diet to kosher per pt request; request nutrition consult  - encourage I/G/M therapy

## 2023-02-03 VITALS — TEMPERATURE: 98 F

## 2023-02-03 PROCEDURE — 99231 SBSQ HOSP IP/OBS SF/LOW 25: CPT

## 2023-02-03 RX ADMIN — PANTOPRAZOLE SODIUM 40 MILLIGRAM(S): 20 TABLET, DELAYED RELEASE ORAL at 08:35

## 2023-02-03 NOTE — BH INPATIENT PSYCHIATRY PROGRESS NOTE - NSBHATTESTTYPEVISIT_PSY_A_CORE
On-site Attending supervising LIANNE (99XXX codes)
On-site Attending supervising LIANNE (99XXX codes)
LIANNE without on-site Attending supervision
On-site Attending supervising LIANNE (99XXX codes)
LIANNE without on-site Attending supervision

## 2023-02-03 NOTE — BH INPATIENT PSYCHIATRY PROGRESS NOTE - NSBHMSETHTCONTENT_PSY_A_CORE
Unremarkable
Delusions/Other
Unremarkable
Delusions/Other
Unremarkable
Delusions/Other
Unremarkable
Unremarkable

## 2023-02-03 NOTE — BH INPATIENT PSYCHIATRY PROGRESS NOTE - NSBHFUPINTERVALCCFT_PSY_A_CORE
paranoia, laurent 

## 2023-02-03 NOTE — BH INPATIENT PSYCHIATRY PROGRESS NOTE - NSBHFUPINTERVALHXFT_PSY_A_CORE
Pt assessed for sxs of paranoia and laurent. Chart reviewed and case discussed with treatment team. Staff reports pt adherent with medications. Pt was accidentally served a veggie wrap with eggplant in it overnight (pt is allergic), but pt denies any ill effects today. +BM after miralax PN yesterday afternoon. Pt adamantly denies SIIP, HIIP, A/VH today. Pt denies paranoia towards family. Pt provided with education on use of 911 or going to the nearest ED if safety concerns should arise in the community; pt verbalized understanding, VPA = 89.5 on 1/30/23. Projected discharge today, 2/3/23, to her son's care.

## 2023-02-03 NOTE — BH INPATIENT PSYCHIATRY PROGRESS NOTE - NSBHMSEAFFQUAL_PSY_A_CORE
elevated at times/Euthymic
elevated at times/Irritable
elevated at times/Irritable
elevated at times/Euthymic
elevated at times/Irritable
elevated at times/Euthymic
elevated at times/Irritable
elevated at times/Euthymic
elevated at times/Irritable
elevated at times/Euthymic

## 2023-02-03 NOTE — BH INPATIENT PSYCHIATRY PROGRESS NOTE - NSTXDCOTHRDATEEST_PSY_ALL_CORE
01-Feb-2023
24-Jan-2023
01-Feb-2023
24-Jan-2023

## 2023-02-03 NOTE — BH INPATIENT PSYCHIATRY PROGRESS NOTE - NSTXPSYCHOGOAL_PSY_ALL_CORE
Will be able to report experiencing hallucinations to staff
Will be able to report experiencing hallucinations to staff
Will verbalize 1 strategy to successfully cope with psychotic symptoms

## 2023-02-03 NOTE — BH INPATIENT PSYCHIATRY PROGRESS NOTE - NSBHMSEPERCEPT_PSY_A_CORE
No abnormalities
Other
No abnormalities

## 2023-02-03 NOTE — BH INPATIENT PSYCHIATRY PROGRESS NOTE - NSBHMETABOLIC_PSY_ALL_CORE_FT
BMI: BMI (kg/m2): 31.7 (01-23-23 @ 15:25)  HbA1c: A1C with Estimated Average Glucose Result: 6.1 % (01-24-23 @ 09:28)    Glucose: POCT Blood Glucose.: 134 mg/dL (01-22-23 @ 14:09)    BP: 106/60 (01-28-23 @ 19:39) (106/60 - 129/66)  Lipid Panel: Date/Time: 01-24-23 @ 09:28  Cholesterol, Serum: 222  Direct LDL: --  HDL Cholesterol, Serum: 40  Total Cholesterol/HDL Ration Measurement: --  Triglycerides, Serum: 119  
BMI: BMI (kg/m2): 31.7 (01-23-23 @ 15:25)  HbA1c: A1C with Estimated Average Glucose Result: 6.1 % (01-24-23 @ 09:28)    Glucose: POCT Blood Glucose.: 134 mg/dL (01-22-23 @ 14:09)    BP: 104/60 (02-01-23 @ 08:35) (104/60 - 121/63)  Lipid Panel: Date/Time: 01-24-23 @ 09:28  Cholesterol, Serum: 222  Direct LDL: --  HDL Cholesterol, Serum: 40  Total Cholesterol/HDL Ration Measurement: --  Triglycerides, Serum: 119  
BMI: BMI (kg/m2): 31.7 (01-23-23 @ 15:25)  HbA1c: A1C with Estimated Average Glucose Result: 6.1 % (01-24-23 @ 09:28)    Glucose: POCT Blood Glucose.: 134 mg/dL (01-22-23 @ 14:09)    BP: 129/66 (01-27-23 @ 08:13) (129/66 - 129/66)  Lipid Panel: Date/Time: 01-24-23 @ 09:28  Cholesterol, Serum: 222  Direct LDL: --  HDL Cholesterol, Serum: 40  Total Cholesterol/HDL Ration Measurement: --  Triglycerides, Serum: 119  
BMI: BMI (kg/m2): 31.7 (01-23-23 @ 15:25)  HbA1c: A1C with Estimated Average Glucose Result: 6.1 % (01-24-23 @ 09:28)    Glucose: POCT Blood Glucose.: 134 mg/dL (01-22-23 @ 14:09)    BP: 130/72 (02-02-23 @ 17:05) (104/60 - 130/72)  Lipid Panel: Date/Time: 01-24-23 @ 09:28  Cholesterol, Serum: 222  Direct LDL: --  HDL Cholesterol, Serum: 40  Total Cholesterol/HDL Ration Measurement: --  Triglycerides, Serum: 119  
BMI: BMI (kg/m2): 31.7 (01-23-23 @ 15:25)  HbA1c: A1C with Estimated Average Glucose Result: 6.1 % (01-24-23 @ 09:28)    Glucose: POCT Blood Glucose.: 134 mg/dL (01-22-23 @ 14:09)    BP: 132/60 (01-23-23 @ 14:05) (107/60 - 141/62)  Lipid Panel: Date/Time: 01-24-23 @ 09:28  Cholesterol, Serum: 222  Direct LDL: --  HDL Cholesterol, Serum: 40  Total Cholesterol/HDL Ration Measurement: --  Triglycerides, Serum: 119  
BMI: BMI (kg/m2): 31.7 (01-23-23 @ 15:25)  HbA1c: A1C with Estimated Average Glucose Result: 6.1 % (01-24-23 @ 09:28)    Glucose: POCT Blood Glucose.: 134 mg/dL (01-22-23 @ 14:09)    BP: 106/60 (01-28-23 @ 19:39) (106/60 - 106/60)  Lipid Panel: Date/Time: 01-24-23 @ 09:28  Cholesterol, Serum: 222  Direct LDL: --  HDL Cholesterol, Serum: 40  Total Cholesterol/HDL Ration Measurement: --  Triglycerides, Serum: 119  
BMI: BMI (kg/m2): 31.7 (01-23-23 @ 15:25)  HbA1c: A1C with Estimated Average Glucose Result: 6.1 % (01-24-23 @ 09:28)    Glucose: POCT Blood Glucose.: 134 mg/dL (01-22-23 @ 14:09)    BP: 129/66 (01-27-23 @ 08:13) (129/66 - 129/66)  Lipid Panel: Date/Time: 01-24-23 @ 09:28  Cholesterol, Serum: 222  Direct LDL: --  HDL Cholesterol, Serum: 40  Total Cholesterol/HDL Ration Measurement: --  Triglycerides, Serum: 119  
BMI: BMI (kg/m2): 31.7 (01-23-23 @ 15:25)  HbA1c: A1C with Estimated Average Glucose Result: 6.1 % (01-24-23 @ 09:28)    Glucose: POCT Blood Glucose.: 134 mg/dL (01-22-23 @ 14:09)    BP: 115/63 (01-30-23 @ 20:08) (106/60 - 115/63)  Lipid Panel: Date/Time: 01-24-23 @ 09:28  Cholesterol, Serum: 222  Direct LDL: --  HDL Cholesterol, Serum: 40  Total Cholesterol/HDL Ration Measurement: --  Triglycerides, Serum: 119  
BMI: BMI (kg/m2): 31.7 (01-23-23 @ 15:25)  HbA1c: A1C with Estimated Average Glucose Result: 6.1 % (01-24-23 @ 09:28)    Glucose: POCT Blood Glucose.: 134 mg/dL (01-22-23 @ 14:09)    BP: 132/60 (01-23-23 @ 14:05) (132/60 - 132/60)  Lipid Panel: Date/Time: 01-24-23 @ 09:28  Cholesterol, Serum: 222  Direct LDL: --  HDL Cholesterol, Serum: 40  Total Cholesterol/HDL Ration Measurement: --  Triglycerides, Serum: 119  
BMI: BMI (kg/m2): 31.7 (01-23-23 @ 15:25)  HbA1c: A1C with Estimated Average Glucose Result: 6.1 % (01-24-23 @ 09:28)    Glucose: POCT Blood Glucose.: 134 mg/dL (01-22-23 @ 14:09)    BP: 104/60 (02-01-23 @ 08:35) (104/60 - 121/63)  Lipid Panel: Date/Time: 01-24-23 @ 09:28  Cholesterol, Serum: 222  Direct LDL: --  HDL Cholesterol, Serum: 40  Total Cholesterol/HDL Ration Measurement: --  Triglycerides, Serum: 119

## 2023-02-03 NOTE — BH INPATIENT PSYCHIATRY PROGRESS NOTE - PRN MEDS
MEDICATIONS  (PRN):  acetaminophen     Tablet .. 650 milliGRAM(s) Oral every 6 hours PRN Temp greater or equal to 38C (100.4F), Mild Pain (1 - 3), Moderate Pain (4 - 6)  aluminum hydroxide/magnesium hydroxide/simethicone Suspension 30 milliLiter(s) Oral every 6 hours PRN Dyspepsia  diphenhydrAMINE 50 milliGRAM(s) Oral every 6 hours PRN Extrapyramidal prophylaxis  diphenhydrAMINE Injectable 50 milliGRAM(s) IntraMuscular once PRN Extrapyramidal prophylaxis  haloperidol     Tablet 5 milliGRAM(s) Oral every 6 hours PRN agitation  haloperidol    Injectable 5 milliGRAM(s) IntraMuscular once PRN agitation  LORazepam     Tablet 2 milliGRAM(s) Oral every 6 hours PRN Anxiety  LORazepam   Injectable 2 milliGRAM(s) IntraMuscular once PRN Agitation  polyethylene glycol 3350 17 Gram(s) Oral daily PRN constipation  
MEDICATIONS  (PRN):  acetaminophen     Tablet .. 650 milliGRAM(s) Oral every 6 hours PRN Temp greater or equal to 38C (100.4F), Mild Pain (1 - 3), Moderate Pain (4 - 6)  aluminum hydroxide/magnesium hydroxide/simethicone Suspension 30 milliLiter(s) Oral every 6 hours PRN Dyspepsia  diphenhydrAMINE 50 milliGRAM(s) Oral every 6 hours PRN Extrapyramidal prophylaxis  diphenhydrAMINE Injectable 50 milliGRAM(s) IntraMuscular once PRN Extrapyramidal prophylaxis  haloperidol     Tablet 5 milliGRAM(s) Oral every 6 hours PRN agitation  haloperidol    Injectable 5 milliGRAM(s) IntraMuscular once PRN agitation  LORazepam     Tablet 2 milliGRAM(s) Oral every 6 hours PRN Anxiety  LORazepam   Injectable 2 milliGRAM(s) IntraMuscular once PRN Agitation  
Yes...
MEDICATIONS  (PRN):  acetaminophen     Tablet .. 650 milliGRAM(s) Oral every 6 hours PRN Temp greater or equal to 38C (100.4F), Mild Pain (1 - 3), Moderate Pain (4 - 6)  aluminum hydroxide/magnesium hydroxide/simethicone Suspension 30 milliLiter(s) Oral every 6 hours PRN Dyspepsia  diphenhydrAMINE 50 milliGRAM(s) Oral every 6 hours PRN Extrapyramidal prophylaxis  diphenhydrAMINE Injectable 50 milliGRAM(s) IntraMuscular once PRN Extrapyramidal prophylaxis  haloperidol     Tablet 5 milliGRAM(s) Oral every 6 hours PRN agitation  haloperidol    Injectable 5 milliGRAM(s) IntraMuscular once PRN agitation  LORazepam     Tablet 2 milliGRAM(s) Oral every 6 hours PRN Anxiety  LORazepam   Injectable 2 milliGRAM(s) IntraMuscular once PRN Agitation  
MEDICATIONS  (PRN):  acetaminophen     Tablet .. 650 milliGRAM(s) Oral every 6 hours PRN Temp greater or equal to 38C (100.4F), Mild Pain (1 - 3), Moderate Pain (4 - 6)  aluminum hydroxide/magnesium hydroxide/simethicone Suspension 30 milliLiter(s) Oral every 6 hours PRN Dyspepsia  diphenhydrAMINE 50 milliGRAM(s) Oral every 6 hours PRN Extrapyramidal prophylaxis  diphenhydrAMINE Injectable 50 milliGRAM(s) IntraMuscular once PRN Extrapyramidal prophylaxis  haloperidol     Tablet 5 milliGRAM(s) Oral every 6 hours PRN agitation  haloperidol    Injectable 5 milliGRAM(s) IntraMuscular once PRN agitation  LORazepam     Tablet 2 milliGRAM(s) Oral every 6 hours PRN Anxiety  LORazepam   Injectable 2 milliGRAM(s) IntraMuscular once PRN Agitation  polyethylene glycol 3350 17 Gram(s) Oral daily PRN constipation  
MEDICATIONS  (PRN):  diphenhydrAMINE 50 milliGRAM(s) Oral every 6 hours PRN Extrapyramidal prophylaxis  diphenhydrAMINE Injectable 50 milliGRAM(s) IntraMuscular once PRN Extrapyramidal prophylaxis  haloperidol     Tablet 5 milliGRAM(s) Oral every 6 hours PRN agitation  haloperidol    Injectable 5 milliGRAM(s) IntraMuscular once PRN agitation  LORazepam     Tablet 2 milliGRAM(s) Oral every 6 hours PRN Anxiety  LORazepam   Injectable 2 milliGRAM(s) IntraMuscular once PRN Agitation  
MEDICATIONS  (PRN):  acetaminophen     Tablet .. 650 milliGRAM(s) Oral every 6 hours PRN Temp greater or equal to 38C (100.4F), Mild Pain (1 - 3), Moderate Pain (4 - 6)  aluminum hydroxide/magnesium hydroxide/simethicone Suspension 30 milliLiter(s) Oral every 6 hours PRN Dyspepsia  diphenhydrAMINE 50 milliGRAM(s) Oral every 6 hours PRN Extrapyramidal prophylaxis  diphenhydrAMINE Injectable 50 milliGRAM(s) IntraMuscular once PRN Extrapyramidal prophylaxis  haloperidol     Tablet 5 milliGRAM(s) Oral every 6 hours PRN agitation  haloperidol    Injectable 5 milliGRAM(s) IntraMuscular once PRN agitation  LORazepam     Tablet 2 milliGRAM(s) Oral every 6 hours PRN Anxiety  LORazepam   Injectable 2 milliGRAM(s) IntraMuscular once PRN Agitation  
MEDICATIONS  (PRN):  diphenhydrAMINE 50 milliGRAM(s) Oral every 6 hours PRN Extrapyramidal prophylaxis  diphenhydrAMINE Injectable 50 milliGRAM(s) IntraMuscular once PRN Extrapyramidal prophylaxis  haloperidol     Tablet 5 milliGRAM(s) Oral every 6 hours PRN agitation  haloperidol    Injectable 5 milliGRAM(s) IntraMuscular once PRN agitation  LORazepam     Tablet 2 milliGRAM(s) Oral every 6 hours PRN Anxiety  LORazepam   Injectable 2 milliGRAM(s) IntraMuscular once PRN Agitation  
MEDICATIONS  (PRN):  acetaminophen     Tablet .. 650 milliGRAM(s) Oral every 6 hours PRN Temp greater or equal to 38C (100.4F), Mild Pain (1 - 3), Moderate Pain (4 - 6)  aluminum hydroxide/magnesium hydroxide/simethicone Suspension 30 milliLiter(s) Oral every 6 hours PRN Dyspepsia  diphenhydrAMINE 50 milliGRAM(s) Oral every 6 hours PRN Extrapyramidal prophylaxis  diphenhydrAMINE Injectable 50 milliGRAM(s) IntraMuscular once PRN Extrapyramidal prophylaxis  haloperidol     Tablet 5 milliGRAM(s) Oral every 6 hours PRN agitation  haloperidol    Injectable 5 milliGRAM(s) IntraMuscular once PRN agitation  LORazepam     Tablet 2 milliGRAM(s) Oral every 6 hours PRN Anxiety  LORazepam   Injectable 2 milliGRAM(s) IntraMuscular once PRN Agitation  polyethylene glycol 3350 17 Gram(s) Oral daily PRN constipation

## 2023-02-03 NOTE — BH INPATIENT PSYCHIATRY PROGRESS NOTE - NSBHMSESPEECH_PSY_A_CORE
Abnormal as indicated, otherwise normal...
Normal volume, rate, productivity, spontaneity and articulation
Abnormal as indicated, otherwise normal...
Normal volume, rate, productivity, spontaneity and articulation

## 2023-02-03 NOTE — BH INPATIENT PSYCHIATRY PROGRESS NOTE - NSTXMEDICINTERMD_PSY_ALL_CORE
psychoed, [facility]OWER 

## 2023-02-03 NOTE — BH INPATIENT PSYCHIATRY PROGRESS NOTE - NSTXDCOTHRGOAL_PSY_ALL_CORE
Patient will comply with treatment recommendations within seven days.

## 2023-02-03 NOTE — BH DISCHARGE NOTE NURSING/SOCIAL WORK/PSYCH REHAB - PATIENT PORTAL LINK FT
You can access the FollowMyHealth Patient Portal offered by [facility] by registering at the following website: [email or web address]. By joining [facility]’s FollowMyHealth portal, you will also be able to view your health information using other applications (apps) compatible with our system.

## 2023-02-03 NOTE — BH INPATIENT PSYCHIATRY PROGRESS NOTE - CURRENT MEDICATION
MEDICATIONS  (STANDING):  divalproex ER 1000 milliGRAM(s) Oral at bedtime  hemorrhoidal Ointment 1 Application(s) Rectal once  pantoprazole    Tablet 40 milliGRAM(s) Oral before breakfast  risperiDONE   Tablet 4 milliGRAM(s) Oral at bedtime  senna 2 Tablet(s) Oral at bedtime  sodium chloride 0.65% Nasal 1 Spray(s) Both Nostrils at bedtime    MEDICATIONS  (PRN):  acetaminophen     Tablet .. 650 milliGRAM(s) Oral every 6 hours PRN Temp greater or equal to 38C (100.4F), Mild Pain (1 - 3), Moderate Pain (4 - 6)  aluminum hydroxide/magnesium hydroxide/simethicone Suspension 30 milliLiter(s) Oral every 6 hours PRN Dyspepsia  diphenhydrAMINE 50 milliGRAM(s) Oral every 6 hours PRN Extrapyramidal prophylaxis  diphenhydrAMINE Injectable 50 milliGRAM(s) IntraMuscular once PRN Extrapyramidal prophylaxis  haloperidol     Tablet 5 milliGRAM(s) Oral every 6 hours PRN agitation  haloperidol    Injectable 5 milliGRAM(s) IntraMuscular once PRN agitation  LORazepam     Tablet 2 milliGRAM(s) Oral every 6 hours PRN Anxiety  LORazepam   Injectable 2 milliGRAM(s) IntraMuscular once PRN Agitation  polyethylene glycol 3350 17 Gram(s) Oral daily PRN constipation  
MEDICATIONS  (STANDING):  divalproex ER 1000 milliGRAM(s) Oral at bedtime  hemorrhoidal Ointment 1 Application(s) Rectal once  pantoprazole    Tablet 40 milliGRAM(s) Oral before breakfast  risperiDONE   Tablet 4 milliGRAM(s) Oral at bedtime  senna 2 Tablet(s) Oral at bedtime  sodium chloride 0.65% Nasal 1 Spray(s) Both Nostrils at bedtime    MEDICATIONS  (PRN):  acetaminophen     Tablet .. 650 milliGRAM(s) Oral every 6 hours PRN Temp greater or equal to 38C (100.4F), Mild Pain (1 - 3), Moderate Pain (4 - 6)  aluminum hydroxide/magnesium hydroxide/simethicone Suspension 30 milliLiter(s) Oral every 6 hours PRN Dyspepsia  diphenhydrAMINE 50 milliGRAM(s) Oral every 6 hours PRN Extrapyramidal prophylaxis  diphenhydrAMINE Injectable 50 milliGRAM(s) IntraMuscular once PRN Extrapyramidal prophylaxis  haloperidol     Tablet 5 milliGRAM(s) Oral every 6 hours PRN agitation  haloperidol    Injectable 5 milliGRAM(s) IntraMuscular once PRN agitation  LORazepam     Tablet 2 milliGRAM(s) Oral every 6 hours PRN Anxiety  LORazepam   Injectable 2 milliGRAM(s) IntraMuscular once PRN Agitation  polyethylene glycol 3350 17 Gram(s) Oral daily PRN constipation  
MEDICATIONS  (STANDING):  divalproex ER 1000 milliGRAM(s) Oral at bedtime  hemorrhoidal Ointment 1 Application(s) Rectal once  risperiDONE   Tablet 3 milliGRAM(s) Oral at bedtime  sodium chloride 0.65% Nasal 1 Spray(s) Both Nostrils at bedtime    MEDICATIONS  (PRN):  acetaminophen     Tablet .. 650 milliGRAM(s) Oral every 6 hours PRN Temp greater or equal to 38C (100.4F), Mild Pain (1 - 3), Moderate Pain (4 - 6)  aluminum hydroxide/magnesium hydroxide/simethicone Suspension 30 milliLiter(s) Oral every 6 hours PRN Dyspepsia  diphenhydrAMINE 50 milliGRAM(s) Oral every 6 hours PRN Extrapyramidal prophylaxis  diphenhydrAMINE Injectable 50 milliGRAM(s) IntraMuscular once PRN Extrapyramidal prophylaxis  haloperidol     Tablet 5 milliGRAM(s) Oral every 6 hours PRN agitation  haloperidol    Injectable 5 milliGRAM(s) IntraMuscular once PRN agitation  LORazepam     Tablet 2 milliGRAM(s) Oral every 6 hours PRN Anxiety  LORazepam   Injectable 2 milliGRAM(s) IntraMuscular once PRN Agitation  
MEDICATIONS  (STANDING):  divalproex ER 1000 milliGRAM(s) Oral at bedtime  risperiDONE   Tablet 3 milliGRAM(s) Oral at bedtime  sodium chloride 0.65% Nasal 1 Spray(s) Both Nostrils at bedtime    MEDICATIONS  (PRN):  diphenhydrAMINE 50 milliGRAM(s) Oral every 6 hours PRN Extrapyramidal prophylaxis  diphenhydrAMINE Injectable 50 milliGRAM(s) IntraMuscular once PRN Extrapyramidal prophylaxis  haloperidol     Tablet 5 milliGRAM(s) Oral every 6 hours PRN agitation  haloperidol    Injectable 5 milliGRAM(s) IntraMuscular once PRN agitation  LORazepam     Tablet 2 milliGRAM(s) Oral every 6 hours PRN Anxiety  LORazepam   Injectable 2 milliGRAM(s) IntraMuscular once PRN Agitation  
MEDICATIONS  (STANDING):  divalproex ER 1000 milliGRAM(s) Oral at bedtime  hemorrhoidal Ointment 1 Application(s) Rectal once  risperiDONE   Tablet 4 milliGRAM(s) Oral at bedtime  senna 2 Tablet(s) Oral at bedtime  sodium chloride 0.65% Nasal 1 Spray(s) Both Nostrils at bedtime    MEDICATIONS  (PRN):  acetaminophen     Tablet .. 650 milliGRAM(s) Oral every 6 hours PRN Temp greater or equal to 38C (100.4F), Mild Pain (1 - 3), Moderate Pain (4 - 6)  aluminum hydroxide/magnesium hydroxide/simethicone Suspension 30 milliLiter(s) Oral every 6 hours PRN Dyspepsia  diphenhydrAMINE 50 milliGRAM(s) Oral every 6 hours PRN Extrapyramidal prophylaxis  diphenhydrAMINE Injectable 50 milliGRAM(s) IntraMuscular once PRN Extrapyramidal prophylaxis  haloperidol     Tablet 5 milliGRAM(s) Oral every 6 hours PRN agitation  haloperidol    Injectable 5 milliGRAM(s) IntraMuscular once PRN agitation  LORazepam     Tablet 2 milliGRAM(s) Oral every 6 hours PRN Anxiety  LORazepam   Injectable 2 milliGRAM(s) IntraMuscular once PRN Agitation  polyethylene glycol 3350 17 Gram(s) Oral daily PRN constipation  
MEDICATIONS  (STANDING):  divalproex ER 1000 milliGRAM(s) Oral at bedtime  hemorrhoidal Ointment 1 Application(s) Rectal once  risperiDONE   Tablet 3 milliGRAM(s) Oral at bedtime  senna 2 Tablet(s) Oral at bedtime  sodium chloride 0.65% Nasal 1 Spray(s) Both Nostrils at bedtime    MEDICATIONS  (PRN):  acetaminophen     Tablet .. 650 milliGRAM(s) Oral every 6 hours PRN Temp greater or equal to 38C (100.4F), Mild Pain (1 - 3), Moderate Pain (4 - 6)  aluminum hydroxide/magnesium hydroxide/simethicone Suspension 30 milliLiter(s) Oral every 6 hours PRN Dyspepsia  diphenhydrAMINE 50 milliGRAM(s) Oral every 6 hours PRN Extrapyramidal prophylaxis  diphenhydrAMINE Injectable 50 milliGRAM(s) IntraMuscular once PRN Extrapyramidal prophylaxis  haloperidol     Tablet 5 milliGRAM(s) Oral every 6 hours PRN agitation  haloperidol    Injectable 5 milliGRAM(s) IntraMuscular once PRN agitation  LORazepam     Tablet 2 milliGRAM(s) Oral every 6 hours PRN Anxiety  LORazepam   Injectable 2 milliGRAM(s) IntraMuscular once PRN Agitation  polyethylene glycol 3350 17 Gram(s) Oral daily PRN constipation  
MEDICATIONS  (STANDING):  divalproex ER 1000 milliGRAM(s) Oral at bedtime  hemorrhoidal Ointment 1 Application(s) Rectal once  risperiDONE   Tablet 3 milliGRAM(s) Oral at bedtime  sodium chloride 0.65% Nasal 1 Spray(s) Both Nostrils at bedtime    MEDICATIONS  (PRN):  acetaminophen     Tablet .. 650 milliGRAM(s) Oral every 6 hours PRN Temp greater or equal to 38C (100.4F), Mild Pain (1 - 3), Moderate Pain (4 - 6)  aluminum hydroxide/magnesium hydroxide/simethicone Suspension 30 milliLiter(s) Oral every 6 hours PRN Dyspepsia  diphenhydrAMINE 50 milliGRAM(s) Oral every 6 hours PRN Extrapyramidal prophylaxis  diphenhydrAMINE Injectable 50 milliGRAM(s) IntraMuscular once PRN Extrapyramidal prophylaxis  haloperidol     Tablet 5 milliGRAM(s) Oral every 6 hours PRN agitation  haloperidol    Injectable 5 milliGRAM(s) IntraMuscular once PRN agitation  LORazepam     Tablet 2 milliGRAM(s) Oral every 6 hours PRN Anxiety  LORazepam   Injectable 2 milliGRAM(s) IntraMuscular once PRN Agitation  
MEDICATIONS  (STANDING):  divalproex ER 1000 milliGRAM(s) Oral at bedtime  hemorrhoidal Ointment 1 Application(s) Rectal once  pantoprazole    Tablet 40 milliGRAM(s) Oral before breakfast  risperiDONE   Tablet 4 milliGRAM(s) Oral at bedtime  senna 2 Tablet(s) Oral at bedtime  sodium chloride 0.65% Nasal 1 Spray(s) Both Nostrils at bedtime    MEDICATIONS  (PRN):  acetaminophen     Tablet .. 650 milliGRAM(s) Oral every 6 hours PRN Temp greater or equal to 38C (100.4F), Mild Pain (1 - 3), Moderate Pain (4 - 6)  aluminum hydroxide/magnesium hydroxide/simethicone Suspension 30 milliLiter(s) Oral every 6 hours PRN Dyspepsia  diphenhydrAMINE 50 milliGRAM(s) Oral every 6 hours PRN Extrapyramidal prophylaxis  diphenhydrAMINE Injectable 50 milliGRAM(s) IntraMuscular once PRN Extrapyramidal prophylaxis  haloperidol     Tablet 5 milliGRAM(s) Oral every 6 hours PRN agitation  haloperidol    Injectable 5 milliGRAM(s) IntraMuscular once PRN agitation  LORazepam     Tablet 2 milliGRAM(s) Oral every 6 hours PRN Anxiety  LORazepam   Injectable 2 milliGRAM(s) IntraMuscular once PRN Agitation  polyethylene glycol 3350 17 Gram(s) Oral daily PRN constipation  
MEDICATIONS  (STANDING):  divalproex ER 1000 milliGRAM(s) Oral at bedtime  risperiDONE   Tablet 3 milliGRAM(s) Oral at bedtime  sodium chloride 0.65% Nasal 1 Spray(s) Both Nostrils at bedtime    MEDICATIONS  (PRN):  acetaminophen     Tablet .. 650 milliGRAM(s) Oral every 6 hours PRN Temp greater or equal to 38C (100.4F), Mild Pain (1 - 3), Moderate Pain (4 - 6)  aluminum hydroxide/magnesium hydroxide/simethicone Suspension 30 milliLiter(s) Oral every 6 hours PRN Dyspepsia  diphenhydrAMINE 50 milliGRAM(s) Oral every 6 hours PRN Extrapyramidal prophylaxis  diphenhydrAMINE Injectable 50 milliGRAM(s) IntraMuscular once PRN Extrapyramidal prophylaxis  haloperidol     Tablet 5 milliGRAM(s) Oral every 6 hours PRN agitation  haloperidol    Injectable 5 milliGRAM(s) IntraMuscular once PRN agitation  LORazepam     Tablet 2 milliGRAM(s) Oral every 6 hours PRN Anxiety  LORazepam   Injectable 2 milliGRAM(s) IntraMuscular once PRN Agitation  
MEDICATIONS  (STANDING):  divalproex ER 1000 milliGRAM(s) Oral at bedtime  risperiDONE   Tablet 3 milliGRAM(s) Oral at bedtime  sodium chloride 0.65% Nasal 1 Spray(s) Both Nostrils at bedtime    MEDICATIONS  (PRN):  diphenhydrAMINE 50 milliGRAM(s) Oral every 6 hours PRN Extrapyramidal prophylaxis  diphenhydrAMINE Injectable 50 milliGRAM(s) IntraMuscular once PRN Extrapyramidal prophylaxis  haloperidol     Tablet 5 milliGRAM(s) Oral every 6 hours PRN agitation  haloperidol    Injectable 5 milliGRAM(s) IntraMuscular once PRN agitation  LORazepam     Tablet 2 milliGRAM(s) Oral every 6 hours PRN Anxiety  LORazepam   Injectable 2 milliGRAM(s) IntraMuscular once PRN Agitation

## 2023-02-03 NOTE — BH INPATIENT PSYCHIATRY PROGRESS NOTE - NSTXMEDICPROGRES_PSY_ALL_CORE
Improving
No Change
Improving
No Change
Met - goal discontinued
No Change

## 2023-02-03 NOTE — BH INPATIENT PSYCHIATRY PROGRESS NOTE - NSTXPROBIMPULS_PSY_ALL_CORE
IMPULSIVITY/AGITATION

## 2023-02-03 NOTE — BH INPATIENT PSYCHIATRY PROGRESS NOTE - NSBHATTESTBILLONSITE_PSY_A_CORE
LIANNE to bill

## 2023-02-03 NOTE — BH INPATIENT PSYCHIATRY PROGRESS NOTE - NSTXMEDICDATEEST_PSY_ALL_CORE
24-Jan-2023

## 2023-02-03 NOTE — BH INPATIENT PSYCHIATRY PROGRESS NOTE - NSBHASSESSSUMMFT_PSY_ALL_CORE
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 SAD, multiple prior psychiatric admissions (most recently at [facility] from 4/15/2021-4/22/2021), currently in outpatient treatment with [facility] (Dr. Pop), no prior SAs/SIB, hx of aggressive behavior (loud, breaking property), no legal hx, PMH of DM, who was brought in by son for bizarre behavior.    Today, pt adamantly denies SIIP, HIIP, A/VH, or paranoia. Pt denies SE. Pt states she will be adherent to treatment as an outpt. Projected discharge today, 2/3/23, to her son's care.      PLAN:   - involuntary admission to [facility] (2PC)  - routine checks (adamantly denies SIIP/HIIP)  - increase risperdal to 4 mg PO QHS (plan for BROWER)  - re-start depakote 1000 mg PO QHS  - PO/IM PRN haldol, ativan, benadryl  - saline nasal spray QHS per pt request   - draw labs for lipid panel, A1c, and urine pregnancy (pt reports menses stopped at age 43 y/o and has been celibate though)  - change diet to kosher per pt request; request nutrition consult  - encourage I/G/M therapy

## 2023-02-03 NOTE — BH INPATIENT PSYCHIATRY PROGRESS NOTE - NSTXMEDICGOAL_PSY_ALL_CORE
Be able to describe the benefit of medication/treatment

## 2023-02-03 NOTE — BH DISCHARGE NOTE NURSING/SOCIAL WORK/PSYCH REHAB - NSDCPRGOAL_PSY_ALL_CORE
Writer met with patient to safety plan for discharge and discuss the patient’s progress throughout the inpatient stay. Patient was receptive to safety planning and readily identified coping skills, triggers, social support, and reasons for living. Upon admission, pt. presented with laurent and paranoia. During inpatient stay, pt. was medication compliant, engaged with treatment team and was not a behavioral concern on the unit. At discharge pt. presents and calm and reports being ready for discharge. Pt. made progress towards her established psych rehab goal as evidenced by pt. being medication compliant. Pt. displays a calm affect and congruent mood. Pt. at times can continue to be irritable and isolative but maintains behavioral control. Pt. denies SI/HI/TH/AH/VH.

## 2023-02-03 NOTE — BH INPATIENT PSYCHIATRY PROGRESS NOTE - NSTXDCOTHRDATETRGT_PSY_ALL_CORE
03-Feb-2023
31-Jan-2023
31-Jan-2023
03-Feb-2023
31-Jan-2023
07-Feb-2023

## 2023-02-03 NOTE — BH INPATIENT PSYCHIATRY PROGRESS NOTE - NSBHATTESTBILLING_PSY_A_CORE
53415-Iponamlsjf OBS or IP - low complexity OR 25-34 mins
76252-Qkxcllwlsu OBS or IP - low complexity OR 25-34 mins
17887-Dmqfesxsho OBS or IP - low complexity OR 25-34 mins
54829-Hzwcdtzbpj OBS or IP - low complexity OR 25-34 mins
76924-Jirrzvyaro OBS or IP - low complexity OR 25-34 mins
36953-Jdzzcwpgru OBS or IP - low complexity OR 25-34 mins
16462-Gbwjvskjwb OBS or IP - low complexity OR 25-34 mins
94580-Qqxdszdqos OBS or IP - low complexity OR 25-34 mins
89453-Kwhaaaxbqi OBS or IP - low complexity OR 25-34 mins
79375-Dvribmncdt OBS or IP - low complexity OR 25-34 mins

## 2023-02-03 NOTE — BH DISCHARGE NOTE NURSING/SOCIAL WORK/PSYCH REHAB - NSDCPRRECOMMEND_PSY_ALL_CORE
Psychiatric Rehabilitation staff recommends that the patient engage in outpatient services for continued medication and symptom management. Upon discharge, patient has an appointment scheduled see aftercare referrals for more information.

## 2023-02-03 NOTE — BH INPATIENT PSYCHIATRY PROGRESS NOTE - NSBHMSEBODY_PSY_A_CORE
Average build
2023

## 2023-02-03 NOTE — BH INPATIENT PSYCHIATRY PROGRESS NOTE - NSTXMEDICDATETRGT_PSY_ALL_CORE
31-Jan-2023
07-Feb-2023

## 2023-02-03 NOTE — BH INPATIENT PSYCHIATRY PROGRESS NOTE - NSBHCHARTREVIEWVS_PSY_A_CORE FT
Vital Signs Last 24 Hrs  T(C): 36.6 (01-30-23 @ 20:08), Max: 36.6 (01-30-23 @ 20:08)  T(F): 97.9 (01-30-23 @ 20:08), Max: 97.9 (01-30-23 @ 20:08)  HR: 88 (01-30-23 @ 20:08) (88 - 88)  BP: 115/63 (01-30-23 @ 20:08) (115/63 - 115/63)  BP(mean): --  RR: --  SpO2: --    
Vital Signs Last 24 Hrs  T(C): 36.7 (01-26-23 @ 06:43), Max: 36.7 (01-26-23 @ 06:43)  T(F): 98.1 (01-26-23 @ 06:43), Max: 98.1 (01-26-23 @ 06:43)  HR: --  BP: --  BP(mean): --  RR: --  SpO2: --    Orthostatic VS  01-26-23 @ 06:43  Lying BP: --/-- HR: --  Sitting BP: 108/62 HR: 84  Standing BP: 100/65 HR: 91  Site: upper left arm  Mode: electronic  Orthostatic VS  01-25-23 @ 08:44  Lying BP: --/-- HR: --  Sitting BP: 118/63 HR: 83  Standing BP: --/-- HR: --  Site: --  Mode: --  
Vital Signs Last 24 Hrs  T(C): 36.4 (02-02-23 @ 08:19), Max: 37 (02-01-23 @ 19:43)  T(F): 97.6 (02-02-23 @ 08:19), Max: 98.6 (02-01-23 @ 19:43)  HR: --  BP: --  BP(mean): --  RR: --  SpO2: --    Orthostatic VS  02-02-23 @ 08:19  Lying BP: --/-- HR: --  Sitting BP: 101/65 HR: 69  Standing BP: --/-- HR: --  Site: --  Mode: --  Orthostatic VS  02-01-23 @ 19:43  Lying BP: --/-- HR: --  Sitting BP: 100/60 HR: 86  Standing BP: --/-- HR: --  Site: --  Mode: --  
Vital Signs Last 24 Hrs  T(C): 36.5 (01-28-23 @ 06:30), Max: 36.5 (01-28-23 @ 06:30)  T(F): 97.7 (01-28-23 @ 06:30), Max: 97.7 (01-28-23 @ 06:30)  HR: --  BP: --  BP(mean): --  RR: --  SpO2: --    Orthostatic VS  01-28-23 @ 06:30  Lying BP: --/-- HR: --  Sitting BP: 123/82 HR: 60  Standing BP: --/-- HR: --  Site: --  Mode: --  Orthostatic VS  01-26-23 @ 19:42  Lying BP: --/-- HR: --  Sitting BP: 116/62 HR: 81  Standing BP: 118/71 HR: 96  Site: --  Mode: --  
Vital Signs Last 24 Hrs  T(C): 36.3 (02-01-23 @ 08:35), Max: 36.7 (01-31-23 @ 19:53)  T(F): 97.3 (02-01-23 @ 08:35), Max: 98.1 (01-31-23 @ 19:53)  HR: 70 (02-01-23 @ 08:35) (70 - 85)  BP: 104/60 (02-01-23 @ 08:35) (104/60 - 121/63)  BP(mean): --  RR: --  SpO2: --    
Vital Signs Last 24 Hrs  T(C): 36.9 (01-25-23 @ 08:44), Max: 36.9 (01-25-23 @ 08:44)  T(F): 98.4 (01-25-23 @ 08:44), Max: 98.4 (01-25-23 @ 08:44)  HR: --  BP: --  BP(mean): --  RR: --  SpO2: --    Orthostatic VS  01-25-23 @ 08:44  Lying BP: --/-- HR: --  Sitting BP: 118/63 HR: 83  Standing BP: --/-- HR: --  Site: --  Mode: --  Orthostatic VS  01-24-23 @ 08:49  Lying BP: --/-- HR: --  Sitting BP: 113/60 HR: 75  Standing BP: 101/60 HR: 70  Site: --  Mode: --  Orthostatic VS  01-23-23 @ 15:25  Lying BP: --/-- HR: --  Sitting BP: 106/64 HR: 88  Standing BP: 110/53 HR: 98  Site: --  Mode: --  
Vital Signs Last 24 Hrs  T(C): --  T(F): --  HR: --  BP: --  BP(mean): --  RR: --  SpO2: --    
Vital Signs Last 24 Hrs  T(C): 36.5 (02-03-23 @ 08:51), Max: 36.8 (02-02-23 @ 17:05)  T(F): 97.7 (02-03-23 @ 08:51), Max: 98.3 (02-02-23 @ 17:05)  HR: 97 (02-02-23 @ 17:05) (97 - 97)  BP: 130/72 (02-02-23 @ 17:05) (130/72 - 130/72)  BP(mean): --  RR: 18 (02-02-23 @ 17:05) (18 - 18)  SpO2: 99% (02-02-23 @ 17:05) (99% - 99%)    Orthostatic VS  02-03-23 @ 08:51  Lying BP: --/-- HR: --  Sitting BP: 100/61 HR: 75  Standing BP: 110/65 HR: 79  Site: --  Mode: --  Orthostatic VS  02-02-23 @ 08:19  Lying BP: --/-- HR: --  Sitting BP: 101/65 HR: 69  Standing BP: --/-- HR: --  Site: --  Mode: --  Orthostatic VS  02-01-23 @ 19:43  Lying BP: --/-- HR: --  Sitting BP: 100/60 HR: 86  Standing BP: --/-- HR: --  Site: --  Mode: --  
Vital Signs Last 24 Hrs  T(C): 36.6 (01-27-23 @ 08:13), Max: 36.7 (01-26-23 @ 19:42)  T(F): 97.8 (01-27-23 @ 08:13), Max: 98.1 (01-26-23 @ 19:42)  HR: 68 (01-27-23 @ 08:13) (68 - 68)  BP: 129/66 (01-27-23 @ 08:13) (129/66 - 129/66)  BP(mean): --  RR: --  SpO2: --    Orthostatic VS  01-26-23 @ 19:42  Lying BP: --/-- HR: --  Sitting BP: 116/62 HR: 81  Standing BP: 118/71 HR: 96  Site: --  Mode: --  Orthostatic VS  01-26-23 @ 06:43  Lying BP: --/-- HR: --  Sitting BP: 108/62 HR: 84  Standing BP: 100/65 HR: 91  Site: upper left arm  Mode: electronic  
Vital Signs Last 24 Hrs  T(C): 36.8 (01-28-23 @ 19:39), Max: 36.8 (01-28-23 @ 19:39)  T(F): 98.3 (01-28-23 @ 19:39), Max: 98.3 (01-28-23 @ 19:39)  HR: 87 (01-28-23 @ 19:39) (87 - 87)  BP: 106/60 (01-28-23 @ 19:39) (106/60 - 106/60)  BP(mean): --  RR: --  SpO2: --    Orthostatic VS  01-28-23 @ 06:30  Lying BP: --/-- HR: --  Sitting BP: 123/82 HR: 60  Standing BP: --/-- HR: --  Site: --  Mode: --

## 2023-02-03 NOTE — BH INPATIENT PSYCHIATRY PROGRESS NOTE - NSBHMSEBEHAV_PSY_A_CORE
irritable though cooperative/Other
irritable though cooperative/Cooperative
irritable though cooperative/Other
irritable though cooperative/Cooperative

## 2023-02-03 NOTE — BH INPATIENT PSYCHIATRY PROGRESS NOTE - NSTXDCOTHRPROGRES_PSY_ALL_CORE
No Change
Met - goal discontinued
No Change
No Change
Improving
No Change

## 2023-02-03 NOTE — BH INPATIENT PSYCHIATRY PROGRESS NOTE - GENERAL APPEARANCE
wearing fake eyelashes/No deformities present

## 2023-02-03 NOTE — BH INPATIENT PSYCHIATRY PROGRESS NOTE - NSTXDISORGINTERMD_PSY_ALL_CORE
medication management 

## 2023-02-03 NOTE — BH DISCHARGE NOTE NURSING/SOCIAL WORK/PSYCH REHAB - DISCHARGE INSTRUCTIONS AFTERCARE APPOINTMENTS
In order to check the location, date, or time of your aftercare appointment, please refer to your Discharge Instructions Document given to you upon leaving the hospital.  If you have lost the instructions please call [phone]

## 2023-02-03 NOTE — BH INPATIENT PSYCHIATRY PROGRESS NOTE - NSBHCONSBHPROVDETAILS_PSY_A_CORE  FT
Encrypted [facility] email sent to Dr. Pop in [facility] - pls see note on 1/24/23 for discussion details 
Encrypted [facility] email sent to Dr. Pop in [facility] - pls see note on 1/24/23 for discussion details 
Encrypted [facility] email sent to Dr. Pop in [facility] - pls see note on 1/24/23 for discussion details 
Encrypted [facility] email sent to Dr. Pop in [facility] - pls see note on 1/24/23 for discussion details 
Encrypted [facility] email sent to Dr. Pop in [facility] - pls see note on 1/24/23 for discussion details 
Encrypted [facility] email sent to Dr. Pop in [facility] - pls see note on 1/24/23 for discussion details 
Encrypted [facility] email sent to Dr. Pop in [facility] - pls see note on 1/24/23 for discussion details 
Encrypted [facility] email sent to Dr. Pop in [facility] - pls see note on 1/24/23 for discussion details 
Encrypted [facility] email sent to Dr. Pop in [facility] - pls see note on 1/24/23 for discussion details 
Encrypted [facility] email sent to Dr. Pop in [facility] - pls see note on 1/24/23 for discussion details

## 2023-02-03 NOTE — BH INPATIENT PSYCHIATRY PROGRESS NOTE - NSTXIMPULSGOAL_PSY_ALL_CORE
Will be able to demonstrate the ability to pause before acting out negatively

## 2023-02-06 NOTE — BH TREATMENT PLAN - NSTXDCOTHRDATEEST_PSY_ALL_CORE
No new care gaps identified.  [facility] Embedded Care Gaps. Reference number: [id number]. 2/06/2023   1:49:04 PM CST  
01-Feb-2023
24-Jan-2023

## 2023-02-08 PROCEDURE — ZZZZZ: CPT

## 2023-03-01 PROCEDURE — ZZZZZ: CPT

## 2023-03-03 NOTE — SOCIAL WORK POST DISCHARGE FOLLOW UP NOTE - NSBHSWFOLLOWUP_PSY_ALL_CORE_FT
Pt provided aftercare vivek, several attempts to contact pt made with no success. Pt stable at discharge and is familiar with emergency contacts and community supports.

## 2023-05-15 NOTE — ED BEHAVIORAL HEALTH ASSESSMENT NOTE - ADULT OR CHILD PROTECTIVE SERVICES INVOLVEMENT
"[facility] Ostomy Assessment  Patient comes to clinic for consultation regarding ostomy issues.    Procedure: Colostomy done at [facility]   Dx related to ostomy: Exploratory laparotomy, resection of portion of sigmoid colon and end colostomy  Consulted per Dr Harmony Edwards      Subjective: Ostomy care is provided by self   Patient's reason for visit: \"has no pouches at home and has received no follow up care\" He was referred to me for asap visit from the urology team who he saw this morning due to not wearing a pouch     The quantity of ostomy supplies needed by a beneficiary is determined primarily by the type of ostomy, its location, its construction, and the condition of the skin surface surrounding the stoma.    Objective:  Type: Colostomy since 02/2023  Stoma: 51 mm  end viable, red, round and slightly protuberant and flat  Location: right lower quadrant     Complications: none   Peristomal skin: intact  And pt is not wearing a pouch  Output: He states his out put is like nicky of stool    Frequency of pouch change: three times weekly. This is  scheduled.     Current pouch system/supplies: He is not wearing a pouch but prefers a two piece    Assessment: Pt appears not to be able to get more pouches from [facility] and he is leaking every day. He is cleaning his skin with soap and water and has been sticking on the barrier with \"skintack\" His PCP dr Benjamin has been signing for his products. I will fax unsigned order to [facility] and they will contact dr Benjamin for signature.   Message to Cassidy to consider Merilax for his constipation    Intervention/Plan: Use heat to make the barrier stick and plan on changing the barrier three x per week. He has a large stoma and large hernia on the right . Urged to watch his weight and work on weight reduction. He states he is physically active and plays basketball and fishing. I will see him in 3 weeks    Return to clinic 3 weeks. Treated and follow-up appointment made     [facility] guy" GORDY Teague was available for supervision of care if needed or if questions should arise and regarding plan of care.  Neeta Molina, RN  RN [facility]       Unable to assess

## 2023-06-29 NOTE — BH INPATIENT PSYCHIATRY PROGRESS NOTE - NSBHCONSDANGERSELF_PSY_A_CORE
unable to care for self
Detail Level: Zone
unable to care for self
unable to care for self
Detail Level: Detailed
unable to care for self

## 2023-09-28 PROCEDURE — ZZZZZ: CPT

## 2024-02-14 PROCEDURE — ZZZZZ: CPT

## 2024-03-18 PROBLEM — E11.9 TYPE 2 DIABETES MELLITUS WITHOUT COMPLICATIONS: Chronic | Status: ACTIVE | Noted: 2023-01-23

## 2024-04-15 ENCOUNTER — APPOINTMENT (OUTPATIENT)
Dept: OPHTHALMOLOGY | Facility: CLINIC | Age: 59
End: 2024-04-15

## 2024-08-29 ENCOUNTER — APPOINTMENT (OUTPATIENT)
Dept: OPHTHALMOLOGY | Facility: CLINIC | Age: 59
End: 2024-08-29

## 2024-10-07 ENCOUNTER — APPOINTMENT (OUTPATIENT)
Dept: OPHTHALMOLOGY | Facility: CLINIC | Age: 59
End: 2024-10-07

## 2024-12-12 NOTE — BH INPATIENT PSYCHIATRY DISCHARGE NOTE - NSBHDCMDCOMP_PSY_ALL_CORE
States he is getting better control of his blood sugars, has had some low blood sugars, has a continuous glucose monitor, on Ozempic and insulin, will also start Farxiga at 5 mg for renal protection   This section is complete and the patient is ready for discharge